# Patient Record
Sex: FEMALE | Race: WHITE | NOT HISPANIC OR LATINO | Employment: PART TIME | URBAN - METROPOLITAN AREA
[De-identification: names, ages, dates, MRNs, and addresses within clinical notes are randomized per-mention and may not be internally consistent; named-entity substitution may affect disease eponyms.]

---

## 2017-01-31 ENCOUNTER — ALLSCRIPTS OFFICE VISIT (OUTPATIENT)
Dept: OTHER | Facility: OTHER | Age: 42
End: 2017-01-31

## 2017-04-07 ENCOUNTER — ALLSCRIPTS OFFICE VISIT (OUTPATIENT)
Dept: OTHER | Facility: OTHER | Age: 42
End: 2017-04-07

## 2017-04-07 DIAGNOSIS — M25.562 PAIN IN LEFT KNEE: ICD-10-CM

## 2017-04-09 ENCOUNTER — OFFICE VISIT (OUTPATIENT)
Dept: URGENT CARE | Facility: MEDICAL CENTER | Age: 42
End: 2017-04-09
Payer: COMMERCIAL

## 2017-04-09 PROCEDURE — G0382 LEV 3 HOSP TYPE B ED VISIT: HCPCS

## 2017-04-09 PROCEDURE — S9083 URGENT CARE CENTER GLOBAL: HCPCS

## 2017-11-30 ENCOUNTER — ALLSCRIPTS OFFICE VISIT (OUTPATIENT)
Dept: OTHER | Facility: OTHER | Age: 42
End: 2017-11-30

## 2017-11-30 DIAGNOSIS — R20.0 ANESTHESIA OF SKIN: ICD-10-CM

## 2017-11-30 DIAGNOSIS — M25.562 PAIN IN LEFT KNEE: ICD-10-CM

## 2017-11-30 DIAGNOSIS — M54.12 RADICULOPATHY OF CERVICAL REGION: ICD-10-CM

## 2017-11-30 DIAGNOSIS — M54.16 RADICULOPATHY OF LUMBAR REGION: ICD-10-CM

## 2017-11-30 DIAGNOSIS — E03.9 HYPOTHYROIDISM: ICD-10-CM

## 2017-11-30 DIAGNOSIS — M79.602 PAIN OF LEFT ARM: ICD-10-CM

## 2017-12-05 NOTE — PROGRESS NOTES
Assessment   1  Cervical radiculopathy (723 4) (M54 12)  2  Lumbar radiculopathy (724 4) (M54 16)  3  Left medial knee pain (719 46) (M25 562)  4  Left arm pain (729 5) (M79 602)  5  Hypothyroidism (244 9) (E03 9)    Plan  Cervical radiculopathy, Hypothyroidism, Left arm pain, Left medial knee pain, Lumbar  radiculopathy    · * XR SPINE CERVICAL 2 OR 3 VW INJURY; Status:Active; Requested for:30Nov2017;    · * XR SPINE LUMBAR 2 OR 3 VIEWS INJURY; Status:Active; Requested for:30Nov2017;    · Follow-up visit in 2 weeks Evaluation and Treatment  Follow-up  Status: Hold For -  Scheduling  Requested for: [id number]  Cervical radiculopathy, Left arm pain, Left medial knee pain, Lumbar radiculopathy    · Start: Cyclobenzaprine HCl - 10 MG Oral Tablet; TAKE 1 TABLET TWICE DAILY AS  NEEDED  Cervical radiculopathy, Lumbar radiculopathy    · Start: Meloxicam 7 5 MG Oral Tablet; 1 to 2 PO QD PRN with Food  Hypothyroidism    · (1) COMPREHENSIVE METABOLIC PANEL; Status:Active - Retrospective By Protocol  Authorization; Requested for:30Nov2017 02:47PM;    · (1) T4, FREE; Status:Active - Retrospective By Protocol Authorization; Requested  for:30Nov2017 02:47PM;    · (1) TSH; Status:Active - Retrospective By Protocol Authorization; Requested  for:30Nov2017 02:47PM;   Numbness    · (1) VITAMIN B12; Status:Active - Retrospective By Protocol Authorization; Requested  for:30Nov2017 02:47PM;     Discussion/Summary    Call if any problems  Check labs for thyroid and other labs for numbness/radiculopathy and trial of Meloxicam prn pain with food and for radiculopathy  Check xrays of cervical and lumbar areas and consult Orthopedics if not better in 2 weeks  Recheck in 2 weeks  She had similar complaints this past April 2017 at Urgent Care  Did not tolerate Prednisone well  The patient was counseled regarding  Chief Complaint  Pt complains of pain in the L upper arm for a couple weeks now   Will get a feeling like just got a shot and then will feel a numbness down the arm  Does also have pain in the L knee and then the the l foot will sometimes feel numb  History of Present Illness  HPI: Pt complains of pain in the L upper arm for a couple weeks now  Will get a feeling like just got a shot and then will feel a numbness down the arm  Does also have pain in the L knee and then the the l foot will sometimes feel numb  Has hypothyroidism and takes thyroid medication  Review of Systems    Constitutional: No fever, no chills, feels well, no tiredness, no recent weight gain or loss  ENT: no ear ache, no loss of hearing, no nosebleeds or nasal discharge, no sore throat or hoarseness  Cardiovascular: no complaints of slow or fast heart rate, no chest pain, no palpitations, no leg claudication or lower extremity edema  Respiratory: no complaints of shortness of breath, no wheezing, no dyspnea on exertion, no orthopnea or PND  Breasts: no complaints of breast pain, breast lump or nipple discharge  Gastrointestinal: no complaints of abdominal pain, no constipation, no nausea or diarrhea, no vomiting, no bloody stools  Genitourinary: no complaints of dysuria, no incontinence, no pelvic pain, no dysmenorrhea, no vaginal discharge or abnormal vaginal bleeding  Musculoskeletal: as noted in HPI  Integumentary: no complaints of skin rash or lesion, no itching or dry skin, no skin wounds  Neurological: as noted in HPI  Other Symptoms: hx of hypothyroidism  Active Problems   1  Cervical radiculopathy (723 4) (M54 12)  2  Cough (786 2) (R05)  3  Encounter for routine gynecological examination (V72 31) (Z01 419)  4  Encounter for screening mammogram for malignant neoplasm of breast (V76 12)   (Z12 31)  5  Hypothyroidism (244 9) (E03 9)  6  Left medial knee pain (719 46) (M25 562)  7  Lumbar radiculopathy (724 4) (M54 16)  8  Nasal congestion (478 19) (R09 81)  9   Need for diphtheria-tetanus-pertussis (Tdap) vaccine, adult/adolescent (V06 1) (Z23)  10  Need for hepatitis B vaccination (V05 3) (Z23)  11  Need for influenza vaccination (V04 81) (Z23)  12  Need for MMR vaccine (V06 4) (Z23)  13  Need for tuberculosis vaccination (V03 2) (Z23)  14  Obesity (278 00) (E66 9)  15  PPD screening test (V74 1) (Z11 1)  16  Sciatica (724 3) (M54 30)    Past Medical History   1  History of Acute sinusitis (461 9) (J01 90)  2  History of Impacted cerumen of left ear (380 4) (H61 22)  3  History of Onychomycosis of toenail (110 1) (B35 1)  4  History of Oral thrush (112 0) (B37 0)  5  History of Pap Smear (+) Low Grade Squamous Intraepithelial Lesion (795 03)  6  History of Pap smear abnormality of cervix with ASCUS favoring benign (795 01)   (R87 610)  7  History of Polyp Of External Auditory Canal (385 30)  8  History of Upper respiratory infection (465 9) (J06 9)    Family History  Mother   1  Family history of Type 2 Diabetes Mellitus  Father   2  Family history of Benign Essential Hypertension    Social History    · Being A Social Drinker   · Current Every Day Smoker (305 1)   · Sexually active    Surgical History   1  History of Thyroid Surgery Total Thyroidectomy  2  History of Tonsillectomy With Adenoidectomy    Current Meds  1  Levothyroxine Sodium 175 MCG Oral Tablet; Therapy: 42BNE0312 to (Evaluate:29Mar2015) Recorded    Allergies   1  LamISIL PACK    Vitals   Recorded: 16JBO1590 16:61LV   Systolic 091   Diastolic 70   Height 5 ft 10 in   Weight 230 lb 8 oz   BMI Calculated 33 07   BSA Calculated 2 22     Physical Exam    Constitutional   General appearance: No acute distress, well appearing and well nourished  Eyes   Conjunctiva and lids: No swelling, erythema or discharge  Pupils and irises: Equal, round and reactive to light  Ears, Nose, Mouth, and Throat   External inspection of ears and nose: Normal     Otoscopic examination: Tympanic membranes translucent with normal light reflex  Canals patent without erythema      Nasal mucosa, septum, and turbinates: Normal without edema or erythema  Oropharynx: Normal with no erythema, edema, exudate or lesions  Pulmonary   Respiratory effort: No increased work of breathing or signs of respiratory distress  Auscultation of lungs: Clear to auscultation  Cardiovascular   Palpation of heart: Normal PMI, no thrills  Auscultation of heart: Normal rate and rhythm, normal S1 and S2, without murmurs  Examination of extremities for edema and/or varicosities: Normal     Carotid pulses: Normal     Lymphatic   Palpation of lymph nodes in neck: No lymphadenopathy  Musculoskeletal   Gait and station: Normal     Digits and nails: Normal without clubbing or cyanosis  Inspection/palpation of joints, bones, and muscles: Abnormal   neck and low back pain with radiculopathy  Skin   Skin and subcutaneous tissue: Normal without rashes or lesions  Neurologic   Cranial nerves: Cranial nerves 2-12 intact  Reflexes: 2+ and symmetric  Sensation: No sensory loss      Psychiatric   Orientation to person, place, and time: Normal     Mood and affect: Normal          Signatures   Electronically signed by : Amira Vallejo DO; Nov 30 2017  2:52PM EST                       (Author)

## 2017-12-08 ENCOUNTER — HOSPITAL ENCOUNTER (OUTPATIENT)
Dept: RADIOLOGY | Facility: HOSPITAL | Age: 42
Discharge: HOME | End: 2017-12-08
Payer: COMMERCIAL

## 2017-12-08 DIAGNOSIS — M79.602 PAIN OF LEFT ARM: ICD-10-CM

## 2017-12-08 DIAGNOSIS — M54.16 RADICULOPATHY OF LUMBAR REGION: ICD-10-CM

## 2017-12-08 DIAGNOSIS — E03.9 HYPOTHYROIDISM: ICD-10-CM

## 2017-12-08 DIAGNOSIS — M25.562 PAIN IN LEFT KNEE: ICD-10-CM

## 2017-12-08 DIAGNOSIS — M54.12 RADICULOPATHY OF CERVICAL REGION: ICD-10-CM

## 2017-12-08 PROCEDURE — 72100 X-RAY EXAM L-S SPINE 2/3 VWS: CPT

## 2017-12-08 PROCEDURE — 72040 X-RAY EXAM NECK SPINE 2-3 VW: CPT

## 2017-12-12 ENCOUNTER — GENERIC CONVERSION - ENCOUNTER (OUTPATIENT)
Dept: OTHER | Facility: OTHER | Age: 42
End: 2017-12-12

## 2017-12-13 ENCOUNTER — GENERIC CONVERSION - ENCOUNTER (OUTPATIENT)
Dept: OTHER | Facility: OTHER | Age: 42
End: 2017-12-13

## 2017-12-14 ENCOUNTER — ALLSCRIPTS OFFICE VISIT (OUTPATIENT)
Dept: OTHER | Facility: OTHER | Age: 42
End: 2017-12-14

## 2018-01-14 VITALS
BODY MASS INDEX: 33 KG/M2 | HEIGHT: 70 IN | SYSTOLIC BLOOD PRESSURE: 136 MMHG | DIASTOLIC BLOOD PRESSURE: 70 MMHG | WEIGHT: 230.5 LBS

## 2018-01-14 VITALS
WEIGHT: 229.38 LBS | SYSTOLIC BLOOD PRESSURE: 113 MMHG | DIASTOLIC BLOOD PRESSURE: 62 MMHG | BODY MASS INDEX: 32.84 KG/M2 | HEIGHT: 70 IN

## 2018-01-14 NOTE — RESULT NOTES
Message   Immune to mumps and varicella zoster  Verified Results  (1) VARICELLA ZOSTER IMMUNITY(IGG) [id number] 11:32AM Verle [place]   TW Order Number: [id number]     Test Name Result Flag Reference   TARSHA ZOSTER IGG IMMUNE  IMMUNE     (1) MUMPS  ANTIBODIES, IGG [id number] 11:32AM Quiana Blade Order Number: [id number]     Test Name Result Flag Reference   MUMPS IgG IMMUNE  IMMUNE   IMMUNE - Presumed immune to mumps infection

## 2018-01-15 NOTE — MISCELLANEOUS
Provider Comments  Provider Comments:   You had an appointment with Dr Humaira Lezama on 01/31/2017, however you did no show this appointment  Please call our office to reschedule this appointment at [phone]   Thank you      Signatures   Electronically signed by : August Irizarry, ; Jan 31 2017  3:55PM EST                       (Author)

## 2018-01-16 NOTE — PROGRESS NOTES
Assessment    1  Hypothyroidism (244 9) (E03 9)   2  Obesity (278 00) (E66 9)    Plan  Health Maintenance, Encounter for routine gynecological examination, Encounter for  screening mammogram for malignant neoplasm of breast, Hypothyroidism, Need for  diphtheria-tetanus-pertussis (Tdap) vaccine, adult/adolescent, Need for hepatitis B  vaccination, Need for influenza vaccination, Need for tuberculosis vaccination,  Obesity    · Follow-up visit in 1 year Evaluation and Treatment  Follow-up  Status: Hold For -  Scheduling  Requested for: [id number]    Discussion/Summary    Here for thyroid f-up and weight check  Awaiting measles titer  Get GYN exams and mammograms as directed  The patient was counseled regarding  Possible side effects of new medications were reviewed with the patient/guardian today  The treatment plan was reviewed with the patient/guardian  The patient/guardian understands and agrees with the treatment plan      Chief Complaint  PT is here today to discuss TSH results  PT has no other concerns  History of Present Illness  HPI: PT is here today to discuss TSH results  PT has no other concerns  Sees Endocrinology next week  TSH is elevated, no symptoms or complaints  Had thyroidectomy and is taking thyroid med as directed  Review of Systems    Constitutional: No fever, no chills, feels well, no tiredness, no recent weight gain or loss  ENT: no ear ache, no loss of hearing, no nosebleeds or nasal discharge, no sore throat or hoarseness  Cardiovascular: no complaints of slow or fast heart rate, no chest pain, no palpitations, no leg claudication or lower extremity edema  Respiratory: no complaints of shortness of breath, no wheezing, no dyspnea on exertion, no orthopnea or PND  Breasts: no complaints of breast pain, breast lump or nipple discharge  Gastrointestinal: no complaints of abdominal pain, no constipation, no nausea or diarrhea, no vomiting, no bloody stools  Genitourinary: no complaints of dysuria, no incontinence, no pelvic pain, no dysmenorrhea, no vaginal discharge or abnormal vaginal bleeding  Musculoskeletal: no complaints of arthralgia, no myalgia, no joint swelling or stiffness, no limb pain or swelling  Integumentary: no complaints of skin rash or lesion, no itching or dry skin, no skin wounds  Neurological: no complaints of headache, no confusion, no numbness or tingling, no dizziness or fainting  Active Problems    1  Encounter for routine gynecological examination (V72 31) (Z01 419)   2  Encounter for screening mammogram for malignant neoplasm of breast (V76 12)   (Z12 31)   3  Hypothyroidism (244 9) (E03 9)   4  Need for diphtheria-tetanus-pertussis (Tdap) vaccine, adult/adolescent (V06 1) (Z23)   5  Need for hepatitis B vaccination (V05 3) (Z23)   6  Need for influenza vaccination (V04 81) (Z23)   7  Need for tuberculosis vaccination (V03 2) (Z23)   8  PPD screening test (V74 1) (Z11 1)   9  Sciatica (724 3) (M54 30)    Past Medical History    1  History of Acute sinusitis (461 9) (J01 90)   2  History of Impacted cerumen of left ear (380 4) (H61 22)   3  History of Onychomycosis of toenail (110 1) (B35 1)   4  History of Oral thrush (112 0) (B37 0)   5  History of Pap Smear (+) Low Grade Squamous Intraepithelial Lesion (795 03)   6  History of Pap smear abnormality of cervix with ASCUS favoring benign (795 01)   (R87 610)   7  History of Polyp Of External Auditory Canal (385 30)   8  History of Upper respiratory infection (465 9) (J06 9)    Family History    1  Family history of Type 2 Diabetes Mellitus    2  Family history of Benign Essential Hypertension    Social History    · Being A Social Drinker   · Current Every Day Smoker (305 1)   · Sexually active    Surgical History    1  History of Thyroid Surgery Total Thyroidectomy   2  History of Tonsillectomy With Adenoidectomy    Current Meds   1   Levothyroxine Sodium 175 MCG Oral Tablet; Therapy: 48EGG4593 to (Evaluate:29Mar2015) Recorded    Allergies    1  LamISIL PACK    Vitals   Recorded: 41RQQ9237 09:11AM Recorded: 11RFH2754 96:38VF   Systolic  515   Diastolic  72   Height 5 ft 10 in    Weight  244 lb 4 00 oz   BMI Calculated 35 05    BSA Calculated 2 27      Physical Exam    Constitutional   General appearance: No acute distress, well appearing and well nourished  Eyes   Conjunctiva and lids: No swelling, erythema or discharge  Pupils and irises: Equal, round and reactive to light  Ears, Nose, Mouth, and Throat   External inspection of ears and nose: Normal     Otoscopic examination: Tympanic membranes translucent with normal light reflex  Canals patent without erythema  Nasal mucosa, septum, and turbinates: Normal without edema or erythema  Oropharynx: Normal with no erythema, edema, exudate or lesions  Pulmonary   Respiratory effort: No increased work of breathing or signs of respiratory distress  Auscultation of lungs: Clear to auscultation  Cardiovascular   Palpation of heart: Normal PMI, no thrills  Auscultation of heart: Normal rate and rhythm, normal S1 and S2, without murmurs  Examination of extremities for edema and/or varicosities: Normal     Carotid pulses: Normal     Lymphatic   Palpation of lymph nodes in neck: No lymphadenopathy  Musculoskeletal   Gait and station: Normal     Digits and nails: Normal without clubbing or cyanosis  Inspection/palpation of joints, bones, and muscles: Normal     Skin   Skin and subcutaneous tissue: Normal without rashes or lesions  Neurologic   Cranial nerves: Cranial nerves 2-12 intact  Reflexes: 2+ and symmetric  Sensation: No sensory loss      Psychiatric   Orientation to person, place, and time: Normal     Mood and affect: Normal          Signatures   Electronically signed by : Brenda Mcmillan DO; Jan 29 2016  9:22AM EST                       (Author)

## 2018-01-16 NOTE — PROGRESS NOTES
Chief Complaint  PT IS HERE FOR A MMR BOOSTER  Active Problems    1  Encounter for routine gynecological examination (V72 31) (Z01 419)   2  Encounter for screening mammogram for malignant neoplasm of breast (V76 12)   (Z12 31)   3  Hypothyroidism (244 9) (E03 9)   4  Need for diphtheria-tetanus-pertussis (Tdap) vaccine, adult/adolescent (V06 1) (Z23)   5  Need for hepatitis B vaccination (V05 3) (Z23)   6  Need for influenza vaccination (V04 81) (Z23)   7  Need for MMR vaccine (V06 4) (Z23)   8  Need for tuberculosis vaccination (V03 2) (Z23)   9  Obesity (278 00) (E66 9)   10  PPD screening test (V74 1) (Z11 1)   11  Sciatica (724 3) (M54 30)    Current Meds   1  Levothyroxine Sodium 175 MCG Oral Tablet; Therapy: 49EVA4533 to (Evaluate:29Mar2015) Recorded    Allergies    1   LamISIL PACK    Plan  Need for MMR vaccine    · MMR    Future Appointments    Date/Time Provider Specialty Site   01/31/2017 03:30 PM Tiffanie Holland DO [facility]     Signatures   Electronically signed by : Brayan Millard DO; Feb 11 2016  1:44PM EST                       (Author)

## 2018-01-18 NOTE — PROGRESS NOTES
Chief Complaint  PT IS HERE FOR A PPD CHECK  Active Problems    1  Encounter for routine gynecological examination (V72 31) (Z01 419)   2  Encounter for screening mammogram for malignant neoplasm of breast (V76 12)   (Z12 31)   3  Hypothyroidism (244 9) (E03 9)   4  Need for diphtheria-tetanus-pertussis (Tdap) vaccine, adult/adolescent (V06 1) (Z23)   5  Need for hepatitis B vaccination (V05 3) (Z23)   6  Need for influenza vaccination (V04 81) (Z23)   7  Need for tuberculosis vaccination (V03 2) (Z23)   8  PPD screening test (V74 1) (Z11 1)   9  Sciatica (724 3) (M54 30)    Current Meds   1  Levothyroxine Sodium 175 MCG Oral Tablet; Therapy: 68PDH9822 to (Evaluate:92Mmu0114) Recorded    Allergies    1   LamISIL PACK    Plan  PPD screening test    · PPD    Signatures   Electronically signed by : Lucia Car DO; Jan 25 2016 12:21PM EST                       (Author)

## 2018-01-23 NOTE — MISCELLANEOUS
Message  Return to work or school:   Adriel Vera is under my professional care  She was seen in my office on 11/30/2017   She is able to return to work on  12/01/2017      Radha Nunes was seen in our office on 11/30/2017 by her Primary Care Physician     Ruby Brower DO/am       Signatures   Electronically signed by : Nicole Parker, ; Dec 12 2017 11:21AM EST                       (Author)

## 2018-01-23 NOTE — RESULT NOTES
Verified Results  * XR SPINE CERVICAL 2 OR 3 VW INJURY [id number] 12:21PM [employer] Order Number: [id number]     Test Name Result Flag Reference   XR SPINE CERVICAL 2 OR 3 VW (Report)     CERVICAL SPINE     INDICATION: Neck pain  COMPARISON: None     VIEWS: AP, lateral and open mouth projections     IMAGES: 4     FINDINGS:     No evidence of fracture or subluxation  C5-6 mild loss of disc height and endplate change noted  The prevertebral soft tissues are within normal limits  The lung apices are intact  IMPRESSION:   Mild degenerative changes noted without acute osseous abnormality  Workstation performed: [id number]     Signed by:   Cole Menezes MD   12/13/17     * XR SPINE LUMBAR 2 OR 3 VIEWS INJURY 50Rur1069 12:21PM [employer] Order Number: [id number]     Test Name Result Flag Reference   XR SPINE LUMBAR 2 OR 3 VIEWS (Report)     LUMBAR SPINE     INDICATION: Back pain  COMPARISON: 9/13/2011     VIEWS: AP, lateral and coned-down projections     IMAGES: 3     FINDINGS:     Mild dextroscoliosis with apex at L4  There is no radiographic evidence of acute fracture or destructive osseous lesion  Mild lower lumbar degenerative changes noted  Visualized soft tissues appear unremarkable  IMPRESSION:   Mild lower lumbar degenerative changes noted and mild dextroscoliosis  No acute findings         Workstation performed: [id number]     Signed by:   Cole Menezes MD   12/13/17

## 2018-01-24 VITALS
BODY MASS INDEX: 32.66 KG/M2 | SYSTOLIC BLOOD PRESSURE: 136 MMHG | DIASTOLIC BLOOD PRESSURE: 76 MMHG | WEIGHT: 228.13 LBS | HEIGHT: 70 IN

## 2018-01-30 RX ORDER — LEVOTHYROXINE SODIUM 175 UG/1
TABLET ORAL
COMMUNITY
Start: 2014-10-27 | End: 2018-01-31 | Stop reason: ALTCHOICE

## 2018-01-30 RX ORDER — LEVOTHYROXINE SODIUM 0.2 MG/1
TABLET ORAL
Refills: 0 | COMMUNITY
Start: 2017-11-03 | End: 2018-05-30 | Stop reason: SDUPTHER

## 2018-01-30 RX ORDER — CYCLOBENZAPRINE HCL 10 MG
1 TABLET ORAL 2 TIMES DAILY PRN
COMMUNITY
Start: 2017-11-30 | End: 2018-01-31 | Stop reason: ALTCHOICE

## 2018-01-30 RX ORDER — MELOXICAM 7.5 MG/1
TABLET ORAL
COMMUNITY
Start: 2017-11-30 | End: 2018-01-31 | Stop reason: ALTCHOICE

## 2018-01-31 ENCOUNTER — OFFICE VISIT (OUTPATIENT)
Dept: FAMILY MEDICINE CLINIC | Facility: CLINIC | Age: 43
End: 2018-01-31
Payer: COMMERCIAL

## 2018-01-31 ENCOUNTER — HOSPITAL ENCOUNTER (OUTPATIENT)
Dept: RADIOLOGY | Facility: HOSPITAL | Age: 43
Discharge: HOME | End: 2018-01-31
Payer: COMMERCIAL

## 2018-01-31 ENCOUNTER — TRANSCRIBE ORDERS (OUTPATIENT)
Dept: LAB | Facility: CLINIC | Age: 43
End: 2018-01-31

## 2018-01-31 ENCOUNTER — APPOINTMENT (OUTPATIENT)
Dept: LAB | Facility: CLINIC | Age: 43
End: 2018-01-31
Payer: COMMERCIAL

## 2018-01-31 VITALS
SYSTOLIC BLOOD PRESSURE: 126 MMHG | HEIGHT: 70 IN | BODY MASS INDEX: 32.1 KG/M2 | DIASTOLIC BLOOD PRESSURE: 78 MMHG | WEIGHT: 224.2 LBS

## 2018-01-31 DIAGNOSIS — R07.9 CHEST PAIN, UNSPECIFIED TYPE: ICD-10-CM

## 2018-01-31 DIAGNOSIS — N64.4 BREAST PAIN, LEFT: ICD-10-CM

## 2018-01-31 DIAGNOSIS — E03.9 HYPOTHYROIDISM: ICD-10-CM

## 2018-01-31 DIAGNOSIS — Z72.0 TOBACCO USE: ICD-10-CM

## 2018-01-31 DIAGNOSIS — R20.0 ANESTHESIA OF SKIN: ICD-10-CM

## 2018-01-31 LAB
ALBUMIN SERPL BCP-MCNC: 3.5 G/DL (ref 3.5–5)
ALBUMIN SERPL BCP-MCNC: 3.9 G/DL (ref 3.5–5)
ALP SERPL-CCNC: 90 U/L (ref 46–116)
ALP SERPL-CCNC: 95 U/L (ref 46–116)
ALT SERPL W P-5'-P-CCNC: 20 U/L (ref 12–78)
ALT SERPL W P-5'-P-CCNC: 23 U/L (ref 12–78)
ANION GAP SERPL CALCULATED.3IONS-SCNC: 6 MMOL/L (ref 4–13)
ANION GAP SERPL CALCULATED.3IONS-SCNC: 7 MMOL/L (ref 4–13)
AST SERPL W P-5'-P-CCNC: 12 U/L (ref 5–45)
AST SERPL W P-5'-P-CCNC: 14 U/L (ref 5–45)
BASOPHILS # BLD AUTO: 0.06 THOUSANDS/ΜL (ref 0–0.1)
BASOPHILS NFR BLD AUTO: 1 % (ref 0–1)
BILIRUB SERPL-MCNC: 0.5 MG/DL (ref 0.2–1)
BILIRUB SERPL-MCNC: 0.57 MG/DL (ref 0.2–1)
BUN SERPL-MCNC: 9 MG/DL (ref 5–25)
BUN SERPL-MCNC: 9 MG/DL (ref 5–25)
CALCIUM SERPL-MCNC: 8.7 MG/DL (ref 8.3–10.1)
CALCIUM SERPL-MCNC: 8.8 MG/DL (ref 8.3–10.1)
CHLORIDE SERPL-SCNC: 103 MMOL/L (ref 100–108)
CHLORIDE SERPL-SCNC: 104 MMOL/L (ref 100–108)
CK SERPL-CCNC: 87 U/L (ref 26–192)
CO2 SERPL-SCNC: 27 MMOL/L (ref 21–32)
CO2 SERPL-SCNC: 27 MMOL/L (ref 21–32)
CREAT SERPL-MCNC: 0.81 MG/DL (ref 0.6–1.3)
CREAT SERPL-MCNC: 0.83 MG/DL (ref 0.6–1.3)
EOSINOPHIL # BLD AUTO: 0.16 THOUSAND/ΜL (ref 0–0.61)
EOSINOPHIL NFR BLD AUTO: 1 % (ref 0–6)
ERYTHROCYTE [DISTWIDTH] IN BLOOD BY AUTOMATED COUNT: 16.6 % (ref 11.6–15.1)
GFR SERPL CREATININE-BSD FRML MDRD: 87 ML/MIN/1.73SQ M
GFR SERPL CREATININE-BSD FRML MDRD: 89 ML/MIN/1.73SQ M
GLUCOSE SERPL-MCNC: 113 MG/DL (ref 65–140)
GLUCOSE SERPL-MCNC: 80 MG/DL (ref 65–140)
HCT VFR BLD AUTO: 37 % (ref 34.8–46.1)
HGB BLD-MCNC: 11.5 G/DL (ref 11.5–15.4)
LYMPHOCYTES # BLD AUTO: 2.98 THOUSANDS/ΜL (ref 0.6–4.47)
LYMPHOCYTES NFR BLD AUTO: 26 % (ref 14–44)
MCH RBC QN AUTO: 24.4 PG (ref 26.8–34.3)
MCHC RBC AUTO-ENTMCNC: 31.1 G/DL (ref 31.4–37.4)
MCV RBC AUTO: 78 FL (ref 82–98)
MONOCYTES # BLD AUTO: 0.82 THOUSAND/ΜL (ref 0.17–1.22)
MONOCYTES NFR BLD AUTO: 7 % (ref 4–12)
NEUTROPHILS # BLD AUTO: 7.45 THOUSANDS/ΜL (ref 1.85–7.62)
NEUTS SEG NFR BLD AUTO: 65 % (ref 43–75)
NRBC BLD AUTO-RTO: 0 /100 WBCS
PLATELET # BLD AUTO: 362 THOUSANDS/UL (ref 149–390)
PMV BLD AUTO: 10.6 FL (ref 8.9–12.7)
POTASSIUM SERPL-SCNC: 3.8 MMOL/L (ref 3.5–5.3)
POTASSIUM SERPL-SCNC: 3.9 MMOL/L (ref 3.5–5.3)
PROT SERPL-MCNC: 7.1 G/DL (ref 6.4–8.2)
PROT SERPL-MCNC: 8 G/DL (ref 6.4–8.2)
RBC # BLD AUTO: 4.72 MILLION/UL (ref 3.81–5.12)
SODIUM SERPL-SCNC: 136 MMOL/L (ref 136–145)
SODIUM SERPL-SCNC: 138 MMOL/L (ref 136–145)
T4 FREE SERPL-MCNC: 1.17 NG/DL (ref 0.76–1.46)
TROPONIN I SERPL-MCNC: <0.02 NG/ML
TSH SERPL DL<=0.05 MIU/L-ACNC: 4.85 UIU/ML (ref 0.36–3.74)
VIT B12 SERPL-MCNC: 249 PG/ML (ref 100–900)
WBC # BLD AUTO: 11.49 THOUSAND/UL (ref 4.31–10.16)

## 2018-01-31 PROCEDURE — 80053 COMPREHEN METABOLIC PANEL: CPT | Performed by: FAMILY MEDICINE

## 2018-01-31 PROCEDURE — 82550 ASSAY OF CK (CPK): CPT | Performed by: FAMILY MEDICINE

## 2018-01-31 PROCEDURE — 93000 ELECTROCARDIOGRAM COMPLETE: CPT | Performed by: FAMILY MEDICINE

## 2018-01-31 PROCEDURE — 84443 ASSAY THYROID STIM HORMONE: CPT

## 2018-01-31 PROCEDURE — 84484 ASSAY OF TROPONIN QUANT: CPT | Performed by: FAMILY MEDICINE

## 2018-01-31 PROCEDURE — 99214 OFFICE O/P EST MOD 30 MIN: CPT | Performed by: FAMILY MEDICINE

## 2018-01-31 PROCEDURE — 84439 ASSAY OF FREE THYROXINE: CPT

## 2018-01-31 PROCEDURE — 82607 VITAMIN B-12: CPT

## 2018-01-31 PROCEDURE — 36415 COLL VENOUS BLD VENIPUNCTURE: CPT

## 2018-01-31 PROCEDURE — 80053 COMPREHEN METABOLIC PANEL: CPT

## 2018-01-31 PROCEDURE — 85025 COMPLETE CBC W/AUTO DIFF WBC: CPT | Performed by: FAMILY MEDICINE

## 2018-01-31 PROCEDURE — 71046 X-RAY EXAM CHEST 2 VIEWS: CPT

## 2018-01-31 NOTE — PROGRESS NOTES
Assessment/Plan:      Diagnoses and all orders for this visit:    Chest pain, unspecified type  Comments:  Check cxray for left sided chest pain and hx of tobacco use  Check left breast diagnostic mammogram and consult [facility] for f-up  Check la  Orders:  -     XR chest pa & lateral; Future  -     CK  -     Troponin I  -     Comprehensive metabolic panel  -     CBC and differential; Future  -     CBC and differential  -     POCT ECG  -     Ambulatory referral to Cardiology; Future    Breast pain, left  -     XR chest pa & lateral; Future  -     Comprehensive metabolic panel  -     CBC and differential; Future  -     CBC and differential  -     POCT ECG  -     Mammo diagnostic left w cad; Future  -     Ambulatory Referral to Breast Surgery; Future    Tobacco use  -     XR chest pa & lateral; Future  -     Comprehensive metabolic panel  -     CBC and differential; Future  -     CBC and differential  -     POCT ECG  -     Ambulatory referral to Cardiology; Future    Other orders  -     Discontinue: cyclobenzaprine (FLEXERIL) 10 mg tablet; Take 1 tablet by mouth 2 (two) times a day as needed  -     levothyroxine 200 mcg tablet; TAKE 1 TABLET (200 MCG TOTAL) BY MOUTH DAILY  -     Discontinue: levothyroxine 175 mcg tablet; Take by mouth  -     Discontinue: meloxicam (MOBIC) 7 5 mg tablet; Take by mouth          Subjective:     Patient ID: Tiffani Mortensen is a 36 y o  female  Here for left breast pain for 2 weeks, pt  Did not feel any lumps in breast  Pt  Is unsure if pain is from left breast or chest pain  No sob or ha  At night feels heartbeat  Pt  Does admit to breast biopsy of left breast as per pt  In her 28's  Pt  Is a pt  Of Dr Macario Briceno  No left arm pain  Hx of tobacco use  No diaphoresis  Review of Systems   Constitutional: Negative  HENT: Negative  Eyes: Negative  Respiratory: Negative  Cardiovascular: Positive for chest pain  Gastrointestinal: Negative  Endocrine: Negative  Genitourinary: Negative  Left breast pain times 2 weeks   Musculoskeletal: Negative  Skin: Negative  Allergic/Immunologic: Negative  Neurological: Negative  Hematological: Negative  Psychiatric/Behavioral: Negative  Objective:     Physical Exam   Constitutional: She is oriented to person, place, and time  She appears well-developed and well-nourished  HENT:   Head: Normocephalic and atraumatic  Nose: Nose normal    Mouth/Throat: Oropharynx is clear and moist    Eyes: Conjunctivae and EOM are normal  Pupils are equal, round, and reactive to light  Neck: Normal range of motion  Neck supple  Cardiovascular: Normal rate, regular rhythm, normal heart sounds and intact distal pulses  Pulmonary/Chest: Effort normal and breath sounds normal  She exhibits tenderness  Abdominal: Soft  Bowel sounds are normal    Genitourinary:   Genitourinary Comments: Left breast pain, no left breast lump palpated on exam today  B/l breast exam today wnl, no nipple discharge and no palpable masses in b/l breasts today  [facility] in room during exam    Musculoskeletal: Normal range of motion  Neurological: She is alert and oriented to person, place, and time  She has normal reflexes  Skin: Skin is warm and dry  Psychiatric: She has a normal mood and affect

## 2018-01-31 NOTE — PATIENT INSTRUCTIONS
Check cxray for left sided chest pain and hx of tobacco use  Check left breast diagnostic mammogram and consult [facility] for f-up  Check labs as directed for left sided chest and left breast pain  Quit tobacco  EKG done today  Due to left sided chest pain and hx of tobacco use rec  Consult with [facility] for left sided chest pain  Rec  ER if left sided chest pain worse  EKG was wnl, no acute ST-T changes

## 2018-02-01 DIAGNOSIS — D72.829 LEUKOCYTOSIS, UNSPECIFIED TYPE: Primary | ICD-10-CM

## 2018-02-22 ENCOUNTER — TELEPHONE (OUTPATIENT)
Dept: FAMILY MEDICINE CLINIC | Facility: CLINIC | Age: 43
End: 2018-02-22

## 2018-02-22 DIAGNOSIS — J02.9 SORE THROAT: Primary | ICD-10-CM

## 2018-02-22 RX ORDER — AZITHROMYCIN 250 MG/1
TABLET, FILM COATED ORAL
Qty: 6 TABLET | Refills: 0 | Status: SHIPPED | OUTPATIENT
Start: 2018-02-22 | End: 2018-02-27

## 2018-02-22 NOTE — TELEPHONE ENCOUNTER
Pt called in stating that since Tuesday her throat and her right ear have been hurting her  We offered her an appt for today and tomorrow and she refused stating that she has 2 jibs she needs to get to  Emilee Oleary would like to know if you can please prescribe her a med?  To [facility]   Cb# [phone]

## 2018-04-27 ENCOUNTER — TELEPHONE (OUTPATIENT)
Dept: FAMILY MEDICINE CLINIC | Facility: CLINIC | Age: 43
End: 2018-04-27

## 2018-04-27 DIAGNOSIS — J06.9 UPPER RESPIRATORY TRACT INFECTION, UNSPECIFIED TYPE: Primary | ICD-10-CM

## 2018-04-27 RX ORDER — AZITHROMYCIN 250 MG/1
TABLET, FILM COATED ORAL
Qty: 6 TABLET | Refills: 0 | Status: SHIPPED | OUTPATIENT
Start: 2018-04-27 | End: 2018-05-02

## 2018-04-27 NOTE — TELEPHONE ENCOUNTER
Patient has a sore throat, stuffy nose, yellow discharge  She can't come in today  She has too many meetings    Can something be called into [facility] [address]

## 2018-05-02 ENCOUNTER — OFFICE VISIT (OUTPATIENT)
Dept: FAMILY MEDICINE CLINIC | Facility: CLINIC | Age: 43
End: 2018-05-02
Payer: COMMERCIAL

## 2018-05-02 VITALS
TEMPERATURE: 97.8 F | SYSTOLIC BLOOD PRESSURE: 118 MMHG | DIASTOLIC BLOOD PRESSURE: 64 MMHG | HEIGHT: 70 IN | BODY MASS INDEX: 31.38 KG/M2 | WEIGHT: 219.2 LBS

## 2018-05-02 DIAGNOSIS — H69.81 DYSFUNCTION OF RIGHT EUSTACHIAN TUBE: Primary | ICD-10-CM

## 2018-05-02 PROBLEM — M54.16 LUMBAR RADICULOPATHY: Status: ACTIVE | Noted: 2017-04-09

## 2018-05-02 PROBLEM — M54.12 CERVICAL RADICULOPATHY: Status: ACTIVE | Noted: 2017-04-09

## 2018-05-02 PROCEDURE — 99213 OFFICE O/P EST LOW 20 MIN: CPT | Performed by: NURSE PRACTITIONER

## 2018-05-02 RX ORDER — FLUTICASONE PROPIONATE 50 MCG
1 SPRAY, SUSPENSION (ML) NASAL DAILY
Qty: 16 G | Refills: 0 | Status: SHIPPED | OUTPATIENT
Start: 2018-05-02 | End: 2019-01-10 | Stop reason: ALTCHOICE

## 2018-05-02 NOTE — PATIENT INSTRUCTIONS
Start flonase, this is one spray each nostril daily  You can also use an over the counter oral decongestant  You may use tylenol or advil as needed for any ear discomfort  Eustachian Tube Dysfunction   WHAT YOU NEED TO KNOW:   What is eustachian tube dysfunction? Eustachian tube dysfunction (ETD) is a condition that prevents your eustachian tubes from opening properly  It can also cause them to become blocked  Eustachian tubes connect your middle ear to the back of your nose and throat  These tubes open and allow air to flow in and out when you sneeze, swallow, or yawn  What causes or increases my risk of ETD? ETD may be caused by swelling or buildup of mucus in your eustachian tubes  Allergies, a cold, or sinus infection can increase your risk for ETD  Smoking also increases your risk for ETD  What are the signs and symptoms of ETD? · Fullness or pressure in your ears    · Muffled hearing    · Pain in one or both ears    · Ringing in your ears    · Popping or clicking feeling in your ears    · Trouble keeping your balance  How is ETD diagnosed? Your healthcare provider will ask about your symptoms  He will examine your ears, your nose, and the back of your throat  He may also do a hearing test    How is ETD treated? Your ETD may get better on its own without any treatment  You may need any of the following:  · Exercises  such as swallowing, yawning, or chewing gum may help to open your eustachian tubes  Your healthcare provider may also recommend that you take a deep breath and then blow with your mouth shut and your nostrils pinched closed  · Air pressure devices  push air into your nose and eustachian tubes to help relieve air pressure in your ear  · Treatment for allergies  such as decongestants, antihistamines, and nasal steroids may improve ETD  They may help decrease swelling of the eustachian tubes  · Ear tubes  may help to keep your middle ear open   During this procedure, your healthcare provider will cut a small hole in your eardrum  · A myringotomy  is procedure to make a small cut in your eardrum and suction out fluid from your middle ear  · Tuboplasty  is a procedure to widen your eustachian tubes  When should I contact my healthcare provider? · Your symptoms do not improve or get worse  · You have a fever  · You have any hearing loss  · You have questions or concerns about your condition or care  CARE AGREEMENT:   You have the right to help plan your care  Learn about your health condition and how it may be treated  Discuss treatment options with your caregivers to decide what care you want to receive  You always have the right to refuse treatment  The above information is an  only  It is not intended as medical advice for individual conditions or treatments  Talk to your doctor, nurse or pharmacist before following any medical regimen to see if it is safe and effective for you  © 2017 [employer] Information is for End User's use only and may not be sold, redistributed or otherwise used for commercial purposes  All illustrations and images included in CareNotes® are the copyrighted property of A D A M , Inc  or Gaston Shearer

## 2018-05-02 NOTE — PROGRESS NOTES
Assessment/Plan:    Eustachian Tube Dysfunction:   Patient will start Flonase once daily to help with symptoms  She can continue with any Tylenol or Advil as needed for any ear discomfort  Patient was reassured that ear does not look infected and ear drums not perforated  No impacted cerumen present  She is to monitor symptoms and if no improvement or any worsening symptoms she is to let us know  Patient educated on relation of Flonase to prednisone which she had an adverse reaction to and to be aware if any reaction happens to let us know but low chance  Note for work given  Patient verbalizes understand and agrees with treatment plan  Diagnoses and all orders for this visit:    Dysfunction of right eustachian tube  -     fluticasone (FLONASE) 50 mcg/act nasal spray; 1 spray into each nostril daily          Subjective:      Patient ID: Dick Landon is a 36 y o  female  Chief Complaint   Patient presents with    Earache     right ear pain since yesterday, denies fever  Just completed Z Krishna yesterday  Patient presents to office today for ear pain  She was just recently seen for an upper respiratory infection and just finished her to from eyes than yesterday  She is now having right ear pain that started yesterday  She blew her nose at 1 point did get little bit dizzy  She feels like her ears are very congested  It is only mild pain  Ear pain started yesterday  Blew nose yesterday and got dizzy with ear pain  Has hx of impacted cerumen      Earache    There is pain in the right ear  This is a new problem  The current episode started yesterday  The problem has been unchanged  There has been no fever  The pain is at a severity of 2/10  The pain is mild  Associated symptoms include headaches (pressure right side, sinus) and rhinorrhea  Pertinent negatives include no abdominal pain, diarrhea, ear discharge, hearing loss, rash, sore throat or vomiting  She has tried nothing for the symptoms  The following portions of the patient's history were reviewed and updated as appropriate: allergies, current medications, past family history, past social history and problem list     Review of Systems   Constitutional: Negative for chills and fever  HENT: Positive for congestion (mild, improved), ear pain (and congestion), rhinorrhea and sinus pressure (right)  Negative for ear discharge, hearing loss, sinus pain and sore throat  Eyes: Negative for discharge and visual disturbance  Respiratory: Negative for chest tightness and shortness of breath  Cardiovascular: Negative for chest pain  Gastrointestinal: Negative for abdominal pain, diarrhea and vomiting  Genitourinary: Negative for difficulty urinating  Musculoskeletal: Negative for myalgias  Skin: Negative for rash  Neurological: Positive for headaches (pressure right side, sinus)  Psychiatric/Behavioral: Negative for agitation  Objective:  /64 (BP Location: Right arm, Patient Position: Sitting, Cuff Size: Standard)   Temp 97 8 °F (36 6 °C) (Tympanic)   Ht 5' 9 5" (1 765 m)   Wt 99 4 kg (219 lb 3 2 oz)   BMI 31 91 kg/m²      Physical Exam   Constitutional: She is oriented to person, place, and time  She appears well-developed  No distress  obese   HENT:   Head: Normocephalic and atraumatic  Right Ear: Hearing, external ear and ear canal normal  No drainage, swelling or tenderness  No foreign bodies  Tympanic membrane is not perforated, not erythematous, not retracted and not bulging  A middle ear effusion is present  No decreased hearing is noted  Left Ear: Hearing, external ear and ear canal normal  No drainage, swelling or tenderness  No foreign bodies  Tympanic membrane is not perforated, not erythematous, not retracted and not bulging  A middle ear effusion is present  No decreased hearing is noted  Nose: Rhinorrhea present  Eyes: Conjunctivae and lids are normal  Right eye exhibits no discharge   Left eye exhibits no discharge  Neck: Normal range of motion  Neck supple  No tracheal deviation present  Cardiovascular: Normal rate and regular rhythm  No murmur heard  Pulmonary/Chest: Effort normal and breath sounds normal  No respiratory distress  She has no wheezes  Abdominal: Soft  Bowel sounds are normal  She exhibits no distension  There is no tenderness  There is no guarding  Musculoskeletal: Normal range of motion  She exhibits no edema or deformity  Lymphadenopathy:     She has no cervical adenopathy  Neurological: She is alert and oriented to person, place, and time  Skin: Skin is warm and dry  No rash noted  She is not diaphoretic  No erythema  Psychiatric: She has a normal mood and affect  Her speech is normal and behavior is normal  Judgment and thought content normal  Cognition and memory are normal    Nursing note and vitals reviewed

## 2018-05-02 NOTE — LETTER
May 2, 2018     Patient: Kevin Moore   YOB: 1975   Date of Visit: 5/2/2018       To Whom it May Concern: Kevin Moore is under my professional care  She was seen in my office on 5/2/2018  She is medically excused from work 5/1/18  She may return to work on 5/2/18  If you have any questions or concerns, please don't hesitate to call           Sincerely,          DEE Betancourt        CC: No Recipients

## 2018-05-30 DIAGNOSIS — E03.9 HYPOTHYROIDISM, UNSPECIFIED TYPE: Primary | ICD-10-CM

## 2018-05-30 RX ORDER — LEVOTHYROXINE SODIUM 0.2 MG/1
TABLET ORAL
Qty: 30 TABLET | Refills: 1 | Status: SHIPPED | OUTPATIENT
Start: 2018-05-30 | End: 2018-07-02 | Stop reason: SDUPTHER

## 2018-07-02 ENCOUNTER — OFFICE VISIT (OUTPATIENT)
Dept: FAMILY MEDICINE CLINIC | Facility: CLINIC | Age: 43
End: 2018-07-02
Payer: COMMERCIAL

## 2018-07-02 ENCOUNTER — APPOINTMENT (OUTPATIENT)
Dept: LAB | Facility: HOSPITAL | Age: 43
End: 2018-07-02
Payer: COMMERCIAL

## 2018-07-02 VITALS
BODY MASS INDEX: 31.78 KG/M2 | HEIGHT: 70 IN | WEIGHT: 222 LBS | SYSTOLIC BLOOD PRESSURE: 128 MMHG | DIASTOLIC BLOOD PRESSURE: 76 MMHG

## 2018-07-02 DIAGNOSIS — R07.89 CHEST TIGHTNESS: Primary | ICD-10-CM

## 2018-07-02 DIAGNOSIS — R20.0 ARM NUMBNESS LEFT: ICD-10-CM

## 2018-07-02 DIAGNOSIS — E66.9 OBESITY (BMI 30-39.9): ICD-10-CM

## 2018-07-02 DIAGNOSIS — R07.89 CHEST TIGHTNESS: ICD-10-CM

## 2018-07-02 DIAGNOSIS — E03.9 HYPOTHYROIDISM, UNSPECIFIED TYPE: ICD-10-CM

## 2018-07-02 LAB
ALBUMIN SERPL BCP-MCNC: 3.6 G/DL (ref 3.5–5)
ALP SERPL-CCNC: 87 U/L (ref 46–116)
ALT SERPL W P-5'-P-CCNC: 17 U/L (ref 12–78)
ANION GAP SERPL CALCULATED.3IONS-SCNC: 7 MMOL/L (ref 4–13)
AST SERPL W P-5'-P-CCNC: 17 U/L (ref 5–45)
BASOPHILS # BLD AUTO: 0.04 THOUSANDS/ΜL (ref 0–0.1)
BASOPHILS NFR BLD AUTO: 0 % (ref 0–1)
BILIRUB SERPL-MCNC: 0.42 MG/DL (ref 0.2–1)
BUN SERPL-MCNC: 8 MG/DL (ref 5–25)
CALCIUM SERPL-MCNC: 8.8 MG/DL (ref 8.3–10.1)
CHLORIDE SERPL-SCNC: 103 MMOL/L (ref 100–108)
CK SERPL-CCNC: 86 U/L (ref 26–192)
CO2 SERPL-SCNC: 25 MMOL/L (ref 21–32)
CREAT SERPL-MCNC: 1 MG/DL (ref 0.6–1.3)
EOSINOPHIL # BLD AUTO: 0.11 THOUSAND/ΜL (ref 0–0.61)
EOSINOPHIL NFR BLD AUTO: 1 % (ref 0–6)
ERYTHROCYTE [DISTWIDTH] IN BLOOD BY AUTOMATED COUNT: 16.8 % (ref 11.6–15.1)
GFR SERPL CREATININE-BSD FRML MDRD: 69 ML/MIN/1.73SQ M
GLUCOSE SERPL-MCNC: 114 MG/DL (ref 65–140)
HCT VFR BLD AUTO: 39.2 % (ref 34.8–46.1)
HGB BLD-MCNC: 12.3 G/DL (ref 11.5–15.4)
LYMPHOCYTES # BLD AUTO: 2.87 THOUSANDS/ΜL (ref 0.6–4.47)
LYMPHOCYTES NFR BLD AUTO: 22 % (ref 14–44)
MCH RBC QN AUTO: 24.9 PG (ref 26.8–34.3)
MCHC RBC AUTO-ENTMCNC: 31.4 G/DL (ref 31.4–37.4)
MCV RBC AUTO: 79 FL (ref 82–98)
MONOCYTES # BLD AUTO: 0.55 THOUSAND/ΜL (ref 0.17–1.22)
MONOCYTES NFR BLD AUTO: 4 % (ref 4–12)
NEUTROPHILS # BLD AUTO: 9.22 THOUSANDS/ΜL (ref 1.85–7.62)
NEUTS SEG NFR BLD AUTO: 72 % (ref 43–75)
NRBC BLD AUTO-RTO: 0 /100 WBCS
PLATELET # BLD AUTO: 306 THOUSANDS/UL (ref 149–390)
PMV BLD AUTO: 10.7 FL (ref 8.9–12.7)
POTASSIUM SERPL-SCNC: 3.5 MMOL/L (ref 3.5–5.3)
PROT SERPL-MCNC: 7.4 G/DL (ref 6.4–8.2)
RBC # BLD AUTO: 4.94 MILLION/UL (ref 3.81–5.12)
SODIUM SERPL-SCNC: 135 MMOL/L (ref 136–145)
T4 FREE SERPL-MCNC: 0.97 NG/DL (ref 0.76–1.46)
TROPONIN I SERPL-MCNC: <0.02 NG/ML
TSH SERPL DL<=0.05 MIU/L-ACNC: 11.32 UIU/ML (ref 0.36–3.74)
WBC # BLD AUTO: 12.79 THOUSAND/UL (ref 4.31–10.16)

## 2018-07-02 PROCEDURE — 84443 ASSAY THYROID STIM HORMONE: CPT

## 2018-07-02 PROCEDURE — 82550 ASSAY OF CK (CPK): CPT

## 2018-07-02 PROCEDURE — 85025 COMPLETE CBC W/AUTO DIFF WBC: CPT

## 2018-07-02 PROCEDURE — 84484 ASSAY OF TROPONIN QUANT: CPT | Performed by: FAMILY MEDICINE

## 2018-07-02 PROCEDURE — 99214 OFFICE O/P EST MOD 30 MIN: CPT | Performed by: FAMILY MEDICINE

## 2018-07-02 PROCEDURE — 93000 ELECTROCARDIOGRAM COMPLETE: CPT | Performed by: FAMILY MEDICINE

## 2018-07-02 PROCEDURE — 84439 ASSAY OF FREE THYROXINE: CPT | Performed by: FAMILY MEDICINE

## 2018-07-02 PROCEDURE — 36415 COLL VENOUS BLD VENIPUNCTURE: CPT | Performed by: FAMILY MEDICINE

## 2018-07-02 PROCEDURE — 80053 COMPREHEN METABOLIC PANEL: CPT

## 2018-07-02 RX ORDER — LEVOTHYROXINE SODIUM 0.2 MG/1
200 TABLET ORAL DAILY
Qty: 30 TABLET | Refills: 0 | Status: SHIPPED | OUTPATIENT
Start: 2018-07-02 | End: 2018-08-08 | Stop reason: SDUPTHER

## 2018-07-02 NOTE — LETTER
July 2, 2018     Patient: Paul Robbins   YOB: 1975   Date of Visit: 7/2/2018       To Whom it May Concern: Paul Robbins is under my professional care  She was seen in my office on 7/2/2018  She may return to work on 7/3/2018  If you have any questions or concerns, please don't hesitate to call           Sincerely,          Arvind Farris DO        CC: No Recipients

## 2018-07-02 NOTE — PATIENT INSTRUCTIONS
Chest tightness, check labs and cxray  EKG done today NSR and no acute ST-T changes   Check labs for hypothyroidism  Consider cardiac cause for chest tightness and left arm numbness  Rec  [facility] consult for f-up  Await labs  Call if worse or ER  Stay out of heat  Lose weight via diet and exercise

## 2018-07-02 NOTE — PROGRESS NOTES
Assessment/Plan:  Chief Complaint   Patient presents with    Numbness     of the R arm again for over a week  Also noticing a chest tightness of the L side since Saturday night  Patient Instructions   Chest tightness, check labs and cxray  EKG done today NSR and no acute ST-T changes   Check labs for hypothyroidism  Consider cardiac cause for chest tightness and left arm numbness  Rec  [facility] consult for f-up  Await labs  Call if worse or ER  Stay out of heat  Lose weight via diet and exercise  No problem-specific Assessment & Plan notes found for this encounter  Diagnoses and all orders for this visit:    Chest tightness  -     CK (with reflex to MB); Future  -     Troponin I  -     Comprehensive metabolic panel; Future  -     CBC and differential; Future  -     XR chest pa & lateral; Future  -     POCT ECG    Arm numbness left  -     XR chest pa & lateral; Future  -     POCT ECG    Hypothyroidism, unspecified type  -     T4, free  -     TSH, 3rd generation; Future  -     levothyroxine 200 mcg tablet; Take 1 tablet (200 mcg total) by mouth daily    Obesity (BMI 30-39  9)          Subjective:      Patient ID: Jessica Soto is a 36 y o  female  Numbness (of the L arm again for over a week  Also noticing a chest tightness of the L side since Saturday night  ) Late last Saturday with chest tightness and left arm numbness that comes and goes  No jaw pain  Has some gurgling sound in epigastric area  Takes levothyroxine for thyroid issues  The following portions of the patient's history were reviewed and updated as appropriate: allergies, current medications, past family history, past medical history, past social history, past surgical history and problem list     Review of Systems   Constitutional: Negative  Obesity   HENT: Negative  Eyes: Negative  Respiratory: Positive for chest tightness (left side)  Cardiovascular: Negative      Gastrointestinal: Negative  Endocrine: Negative  Genitourinary: Negative  Musculoskeletal: Negative  Skin: Negative  Allergic/Immunologic: Negative  Neurological:        Left arm numbness comes and goes     Hematological: Negative  Psychiatric/Behavioral: Negative  Objective:      /76   Ht 5' 9 5" (1 765 m)   Wt 101 kg (222 lb)   BMI 32 31 kg/m²          Physical Exam   Constitutional: She is oriented to person, place, and time  She appears well-developed and well-nourished  obesity   HENT:   Head: Normocephalic and atraumatic  Right Ear: External ear normal    Left Ear: External ear normal    Nose: Nose normal    Mouth/Throat: Oropharynx is clear and moist    Eyes: Conjunctivae and EOM are normal  Pupils are equal, round, and reactive to light  Neck: Normal range of motion  Neck supple  Cardiovascular: Normal rate, regular rhythm, normal heart sounds and intact distal pulses  Pulmonary/Chest: Effort normal and breath sounds normal    Musculoskeletal: Normal range of motion  Neurological: She is alert and oriented to person, place, and time  She has normal reflexes  Skin: Skin is warm and dry  Psychiatric: She has a normal mood and affect   Her behavior is normal

## 2018-07-03 DIAGNOSIS — E03.9 HYPOTHYROIDISM, UNSPECIFIED TYPE: Primary | ICD-10-CM

## 2018-07-03 DIAGNOSIS — D72.829 LEUKOCYTOSIS, UNSPECIFIED TYPE: Primary | ICD-10-CM

## 2018-07-03 RX ORDER — LEVOTHYROXINE SODIUM 0.03 MG/1
25 TABLET ORAL DAILY
Qty: 30 TABLET | Refills: 2 | Status: SHIPPED | OUTPATIENT
Start: 2018-07-03 | End: 2018-10-15 | Stop reason: SDUPTHER

## 2018-07-05 DIAGNOSIS — E03.8 OTHER SPECIFIED HYPOTHYROIDISM: Primary | ICD-10-CM

## 2018-08-03 ENCOUNTER — OFFICE VISIT (OUTPATIENT)
Dept: HEMATOLOGY ONCOLOGY | Facility: CLINIC | Age: 43
End: 2018-08-03
Payer: COMMERCIAL

## 2018-08-03 VITALS
HEART RATE: 55 BPM | BODY MASS INDEX: 31.64 KG/M2 | TEMPERATURE: 97.8 F | SYSTOLIC BLOOD PRESSURE: 130 MMHG | HEIGHT: 70 IN | DIASTOLIC BLOOD PRESSURE: 84 MMHG | OXYGEN SATURATION: 99 % | RESPIRATION RATE: 18 BRPM | WEIGHT: 221 LBS

## 2018-08-03 DIAGNOSIS — Z12.31 ENCOUNTER FOR SCREENING MAMMOGRAM FOR BREAST CANCER: ICD-10-CM

## 2018-08-03 DIAGNOSIS — D72.829 LEUKOCYTOSIS, UNSPECIFIED TYPE: ICD-10-CM

## 2018-08-03 DIAGNOSIS — R71.8 MICROCYTOSIS: ICD-10-CM

## 2018-08-03 DIAGNOSIS — D72.823 LEUKEMOID REACTION: Primary | ICD-10-CM

## 2018-08-03 DIAGNOSIS — M54.12 CERVICAL RADICULOPATHY: ICD-10-CM

## 2018-08-03 PROCEDURE — 99244 OFF/OP CNSLTJ NEW/EST MOD 40: CPT | Performed by: INTERNAL MEDICINE

## 2018-08-03 NOTE — PROGRESS NOTES
8/3/2018    Dick Ladnon was seen in consultation today in regards to mild leukocytosis  She is 36years old and has noted increasing fatigue in the past 4 months  She complains of chest tightness and numbness and tingling of the left arm intermittently i e  about a few days at a time about 1 time per month in the past 6 months  Levothyroxine was increased from 200 mcg per day to 225 mcg per day this past month  Cardiology consultation has been arranged for August 9, 2018  Review of Systems:     General: Feels well, no chills or swaets  Head and Neck: No nosebleeds, no oral cavity or throat soreness  Cardiovascular: No lower extremity edema  Respriatory: No cough or dyspnea  GI: Appetite is good, no abdominal pain, bowel habits formed and regular  :  She notes nocturia 1-2 times  Menstrual periods have been regular with normal flow  Musculoskeletal:  She has mild chronic low back pain intermittently aggravated by more vigorous activities  No joint pain  Skin: No skin rash  Neurological: No headache, no numbness, no weakness  Hematologic: No easy bruising  Psychiatric: No emotional problems    Medications:    Current Outpatient Prescriptions   Medication Sig Dispense Refill    levothyroxine 200 mcg tablet Take 1 tablet (200 mcg total) by mouth daily 30 tablet 0    levothyroxine 25 mcg tablet Take 1 tablet (25 mcg total) by mouth daily 30 tablet 2    fluticasone (FLONASE) 50 mcg/act nasal spray 1 spray into each nostril daily 16 g 0     No current facility-administered medications for this visit  Past Medical History:    Hypothyroidism  Past Surgical History: Total thyroidectomy June 20, 2014  Tonsil and adenoid surgery at age 23  Family History:    Her mother age 79 has diabetes mellitus  Her father age 70 has diabetes mellitus  Her brother age 52 is in good health  Her son has diabetes mellitus  Social History:    She is  and lives with her       She has been a cigarette smoker 1 pack per day for 25 years  She is not an alcohol drinker  She has worked as an  in a [address] [place] [address] office  Physical Examination:    General appearance: Appears well  Head: Normocephalic  Eyes: Extraocular movements intact  Ears: No gross hearing deficit  Oropharynx: Clear  Neck: Supple, No lymphadenopathy  Chest: No axillary adenopathy (breast examination was not done today )  Lungs: Clear to auscultation bilaterally  Heart: Regular rate and rhythm  Abdomen: No hepatic or splenic enlargement; No inguinal  lymphadenopathy  Extremities: No lower extremity edema bilaterally  Skin: No rashes  Neurologic: Grossly intact, no focal neurological deficit  Psychiatric: Oriented to person, place and time, normal mood and affect    ECOG 1    Laboratory:     From February 24, 2015:  WBC 10 98 with ANC 6 92    From January 31, 2018:  WBC is 11 49 with ANC 7 45    From July 2, 2018:  TSH is 11 316, free T4 is 0 97 (0 76-1 46), WBC 12 79 with ANC 9 22 (1 85-7 62), hemoglobin 12 3 with MCV 79, platelets 955, WBC differential neutrophils 72%, lymphs 22%, monos 4%, eos 1%, creatinine 1 0, calcium 8 8, AST 17, ALT 17, alk-phos 87, bili 0 42  Assessment:    Leukocytosis, mild dating back at least to February 2015, there is mild neutrophilia  Most likely leukocytosis has resulted from cigarette smoking  There is no known underlying inflammatory condition or neoplastic disorder  The patient complains of excessive fatigue in the past 4 months and intermittent chest pressure in the past 6 months  Hypothyroidism has been corrected and cardiology consultation has already been arranged for August 9, 2018  A primary bone marrow disorder such as a myeloproliferative disorder is less likely  Recommendations:    Further evaluation is to begin with ESR to assess for underlying inflammatory condition    If ESR is significantly elevated without explanation then imaging of the chest would be recommended  JAK2 mutational analysis and bcr/ABL translocation by [facility] are recommended to assess for primary underlying bone marrow disorder  Cancer screening is to include fecal occult blood testing which is to be done in between menstrual periods so as to avoid contamination of the specimen with menstrual blood  Arrangements have ready been made for GYN evaluation on September 18, 2018  Mammogram is recommended for breast cancer screening  Cigarette smoking cessation is recommended primarily for the patient's general health, however, if leukocytosis were to resolve with cigarette smoking cessation then no further workup of the leukocytosis would be required  In the case of progressive leukocytosis without explanation then bone marrow examination would be recommended to assess for primary bone marrow disorder which could not be diagnosed otherwise  The patient is aware seek medical attention for more frequent left-sided chest pain or left upper extremity pain, excessive fatigue, or if other new problems arise  Otherwise, plan to see her again in 6 months

## 2018-08-03 NOTE — PATIENT INSTRUCTIONS
The patient is aware seek medical attention for more frequent left-sided chest pain or left upper extremity pain, excessive fatigue, or if other new problems arise

## 2018-08-08 DIAGNOSIS — E03.9 HYPOTHYROIDISM, UNSPECIFIED TYPE: ICD-10-CM

## 2018-08-08 RX ORDER — LEVOTHYROXINE SODIUM 0.2 MG/1
200 TABLET ORAL DAILY
Qty: 30 TABLET | Refills: 2 | Status: SHIPPED | OUTPATIENT
Start: 2018-08-08 | End: 2019-01-10 | Stop reason: SDUPTHER

## 2018-08-09 ENCOUNTER — OFFICE VISIT (OUTPATIENT)
Dept: CARDIOLOGY CLINIC | Facility: CLINIC | Age: 43
End: 2018-08-09
Payer: COMMERCIAL

## 2018-08-09 VITALS
BODY MASS INDEX: 32.52 KG/M2 | DIASTOLIC BLOOD PRESSURE: 68 MMHG | HEART RATE: 72 BPM | SYSTOLIC BLOOD PRESSURE: 98 MMHG | WEIGHT: 223.4 LBS

## 2018-08-09 DIAGNOSIS — R07.89 OTHER CHEST PAIN: Primary | ICD-10-CM

## 2018-08-09 PROCEDURE — 99203 OFFICE O/P NEW LOW 30 MIN: CPT | Performed by: INTERNAL MEDICINE

## 2018-08-09 NOTE — LETTER
2018     Antione Aranza, [address],[place] [address]     Patient: Amanda Tellez   YOB: 1975   Date of Visit: 2018       Dear Dr Moise Argueta: Thank you for referring Amanda Tellez to me for evaluation  Below are my notes for this consultation  If you have questions, please do not hesitate to call me  I look forward to following your patient along with you  Sincerely,        Mao Aburto MD        CC: No Recipients  Mao Aburto MD  2018  4:33 PM  Sign at close encounter  [address] [facility] [place]  [address]  [facility] [facility]  [phone]    Cardiology Follow up    Patient:  Amanda Tellez  :  1975  MRN:  [id number]    History of Present Illness:     58-year-old female with past medical history of hypothyroidism as well as tobacco use and leukocytosis presents as a new patient cardiology evaluation for chest and arm discomfort  She notices that after using her left arm to lift thing she gets some discomfort the travels from her left shoulder into her chest and also down her left arm which feels like some numbness and tingling in that area  This discomfort lasted few minutes typically  He is not precipitated by exertion in general-she can walk up many flights of stairs without any chest discomfort  The chest discomfort that she describes is only exacerbated after she uses her left arm at [address] her part-time job at [employer] or is lifting her laundry basket with her left arm  She notes no shortness of breath and denies any palpitations, dizziness, or syncope  She is  with 1 son  She works as an  for a [employer] and also has a part-time job with [employer]  She is active smoker but does not drink significant alcohol or use any drugs              Patient Active Problem List   Diagnosis    Cervical radiculopathy    Chronic lymphocytic thyroiditis    Hypothyroidism    Lumbar radiculopathy    Obesity    Sciatica    Leukemoid reaction       Past Surgical History  Past Surgical History:   Procedure Laterality Date    TONSILLECTOMY AND ADENOIDECTOMY      TOTAL THYROIDECTOMY N/A 06/21/2014    Dr Juan R Aguero History   Social History     Social History    Marital status: /Civil Union     Spouse name: N/A    Number of children: N/A    Years of education: N/A     Occupational History    Not on file  Social History Main Topics    Smoking status: Current Every Day Smoker    Smokeless tobacco: Never Used    Alcohol use Yes      Comment: occassional  / social per [employer]    Drug use: No    Sexual activity: Yes     Other Topics Concern    Not on file     Social History Narrative    No narrative on file        Allergies   Allergen Reactions    Lamisil [Terbinafine]     Prednisone      Rapid heart beat and palpitations        Family History   Family History   Problem Relation Age of Onset    Diabetes Son     Diabetes type II Mother     Hypertension Father         benign essential       Review of Systems:  Review of Systems   Constitutional: Negative for chills, fatigue and fever  HENT: Negative for hearing loss, nosebleeds and tinnitus  Eyes: Negative for pain  Respiratory: Positive for chest tightness  Negative for cough and shortness of breath  Cardiovascular: Negative for chest pain, palpitations and leg swelling  Gastrointestinal: Negative for abdominal pain, nausea and vomiting  Endocrine: Negative for cold intolerance and heat intolerance  Genitourinary: Negative for difficulty urinating, hematuria and vaginal bleeding  Musculoskeletal: Negative for arthralgias  Skin: Negative for rash  Allergic/Immunologic: Negative for environmental allergies  Neurological: Positive for numbness  Negative for dizziness, syncope, weakness and light-headedness     Psychiatric/Behavioral: Negative for decreased concentration and sleep disturbance  The patient is not nervous/anxious  Current Outpatient Prescriptions:     fluticasone (FLONASE) 50 mcg/act nasal spray, 1 spray into each nostril daily, Disp: 16 g, Rfl: 0    levothyroxine 200 mcg tablet, Take 1 tablet (200 mcg total) by mouth daily, Disp: 30 tablet, Rfl: 2    levothyroxine 25 mcg tablet, Take 1 tablet (25 mcg total) by mouth daily, Disp: 30 tablet, Rfl: 2     Physical Exam:    Vitals:    08/09/18 1557   BP: 98/68   BP Location: Right arm   Patient Position: Sitting   Cuff Size: Large   Pulse: 72   Weight: 101 kg (223 lb 6 4 oz)       Physical Exam   Constitutional: She is oriented to person, place, and time  She appears well-developed and well-nourished  HENT:   Head: Normocephalic  Right Ear: External ear normal    Left Ear: External ear normal    Mouth/Throat: Oropharynx is clear and moist    Eyes: Pupils are equal, round, and reactive to light  Neck: No JVD present  Carotid bruit is not present  Cardiovascular: Normal rate, regular rhythm and intact distal pulses  Exam reveals no gallop and no friction rub  No murmur heard  Pulmonary/Chest: Effort normal and breath sounds normal  No tachypnea  No respiratory distress  She has no wheezes  She has no rales  She exhibits no tenderness  Abdominal: Soft  She exhibits no distension  There is no tenderness  There is no rebound and no guarding  Musculoskeletal: She exhibits no edema  Neurological: She is alert and oriented to person, place, and time  Skin: Skin is warm and dry  Psychiatric: She has a normal mood and affect  Her behavior is normal  Judgment and thought content normal    Nursing note and vitals reviewed  Electrocardiogram from her primary doctor's office is normal     Labs:not applicable    Assessment/Plan:    1  Arm/chest discomfort-at this time I do not think that she has a cardiovascular etiology for her symptoms    I think is possible that she has a musculoskeletal or impingement related etiology for this discomfort  This symptom is not precipitated by exertion in general only by use of her left arm and is relieved by resting her left arm  I do not think she needs further cardiovascular testing at this time  2   I did  her regarding stopping tobacco     We can see her back on an as-needed basis if needed in the future      Raoul Lei MD  8/9/2018  4:11 PM

## 2018-08-09 NOTE — PROGRESS NOTES
[address] [facility] [place]  [address]  [facility], [address] [facility]  [phone]    Cardiology Follow up    Patient:  Vanessa Holliday  :  1975  MRN:  [id number]    History of Present Illness:     48-year-old female with past medical history of hypothyroidism as well as tobacco use and leukocytosis presents as a new patient cardiology evaluation for chest and arm discomfort  She notices that after using her left arm to lift thing she gets some discomfort the travels from her left shoulder into her chest and also down her left arm which feels like some numbness and tingling in that area  This discomfort lasted few minutes typically  He is not precipitated by exertion in general-she can walk up many flights of stairs without any chest discomfort  The chest discomfort that she describes is only exacerbated after she uses her left arm at [address] her part-time job at [employer] or is lifting her laundry basket with her left arm  She notes no shortness of breath and denies any palpitations, dizziness, or syncope  She is  with 1 son  She works as an  for [employer] and also has a part-time job with [employer]  She is active smoker but does not drink significant alcohol or use any drugs  Patient Active Problem List   Diagnosis    Cervical radiculopathy    Chronic lymphocytic thyroiditis    Hypothyroidism    Lumbar radiculopathy    Obesity    Sciatica    Leukemoid reaction       Past Surgical History  Past Surgical History:   Procedure Laterality Date    TONSILLECTOMY AND ADENOIDECTOMY      TOTAL THYROIDECTOMY N/A 2014    Dr Abbey Duran History   Social History     Social History    Marital status: /Civil Union     Spouse name: N/A    Number of children: N/A    Years of education: N/A     Occupational History    Not on file       Social History Main Topics    Smoking status: Current Every Day Smoker    Smokeless tobacco: Never Used    Alcohol use Yes      Comment: occassional  / social per [employer]    Drug use: No    Sexual activity: Yes     Other Topics Concern    Not on file     Social History Narrative    No narrative on file        Allergies   Allergen Reactions    Lamisil [Terbinafine]     Prednisone      Rapid heart beat and palpitations        Family History   Family History   Problem Relation Age of Onset    Diabetes Son     Diabetes type II Mother     Hypertension Father         benign essential       Review of Systems:  Review of Systems   Constitutional: Negative for chills, fatigue and fever  HENT: Negative for hearing loss, nosebleeds and tinnitus  Eyes: Negative for pain  Respiratory: Positive for chest tightness  Negative for cough and shortness of breath  Cardiovascular: Negative for chest pain, palpitations and leg swelling  Gastrointestinal: Negative for abdominal pain, nausea and vomiting  Endocrine: Negative for cold intolerance and heat intolerance  Genitourinary: Negative for difficulty urinating, hematuria and vaginal bleeding  Musculoskeletal: Negative for arthralgias  Skin: Negative for rash  Allergic/Immunologic: Negative for environmental allergies  Neurological: Positive for numbness  Negative for dizziness, syncope, weakness and light-headedness  Psychiatric/Behavioral: Negative for decreased concentration and sleep disturbance  The patient is not nervous/anxious            Current Outpatient Prescriptions:     fluticasone (FLONASE) 50 mcg/act nasal spray, 1 spray into each nostril daily, Disp: 16 g, Rfl: 0    levothyroxine 200 mcg tablet, Take 1 tablet (200 mcg total) by mouth daily, Disp: 30 tablet, Rfl: 2    levothyroxine 25 mcg tablet, Take 1 tablet (25 mcg total) by mouth daily, Disp: 30 tablet, Rfl: 2     Physical Exam:    Vitals:    08/09/18 1557   BP: 98/68   BP Location: Right arm   Patient Position: Sitting   Cuff Size: Large   Pulse: 72   Weight: 101 kg (223 lb 6 4 oz)       Physical Exam   Constitutional: She is oriented to person, place, and time  She appears well-developed and well-nourished  HENT:   Head: Normocephalic  Right Ear: External ear normal    Left Ear: External ear normal    Mouth/Throat: Oropharynx is clear and moist    Eyes: Pupils are equal, round, and reactive to light  Neck: No JVD present  Carotid bruit is not present  Cardiovascular: Normal rate, regular rhythm and intact distal pulses  Exam reveals no gallop and no friction rub  No murmur heard  Pulmonary/Chest: Effort normal and breath sounds normal  No tachypnea  No respiratory distress  She has no wheezes  She has no rales  She exhibits no tenderness  Abdominal: Soft  She exhibits no distension  There is no tenderness  There is no rebound and no guarding  Musculoskeletal: She exhibits no edema  Neurological: She is alert and oriented to person, place, and time  Skin: Skin is warm and dry  Psychiatric: She has a normal mood and affect  Her behavior is normal  Judgment and thought content normal    Nursing note and vitals reviewed  Electrocardiogram from her primary doctor's office is normal     Labs:not applicable    Assessment/Plan:    1  Arm/chest discomfort-at this time I do not think that she has a cardiovascular etiology for her symptoms  I think is possible that she has a musculoskeletal or impingement related etiology for this discomfort  This symptom is not precipitated by exertion in general only by use of her left arm and is relieved by resting her left arm  I do not think she needs further cardiovascular testing at this time  2   I did  her regarding stopping tobacco     We can see her back on an as-needed basis if needed in the future      Giovanni Trejo MD  8/9/2018  4:11 PM

## 2018-09-10 ENCOUNTER — TELEPHONE (OUTPATIENT)
Dept: FAMILY MEDICINE CLINIC | Facility: CLINIC | Age: 43
End: 2018-09-10

## 2018-09-10 DIAGNOSIS — J02.9 SORE THROAT: Primary | ICD-10-CM

## 2018-09-10 RX ORDER — AZITHROMYCIN 250 MG/1
TABLET, FILM COATED ORAL
Qty: 6 TABLET | Refills: 0 | Status: SHIPPED | OUTPATIENT
Start: 2018-09-10 | End: 2018-09-16

## 2018-09-10 NOTE — TELEPHONE ENCOUNTER
Pt called in stating that since Saturday she has had a sore throat and some congestion  Jessicaasif Murphy states that she has been using sudafed since Saturday and it is not helping her  I offered her an appointment with you today but she said she has 2 jobs to work today   Jessicaasif Murphy would like to know if you can please prescribe her something and send it to [facility]   CB# [phone]

## 2018-10-08 ENCOUNTER — OFFICE VISIT (OUTPATIENT)
Dept: FAMILY MEDICINE CLINIC | Facility: CLINIC | Age: 43
End: 2018-10-08
Payer: COMMERCIAL

## 2018-10-08 ENCOUNTER — APPOINTMENT (OUTPATIENT)
Dept: LAB | Facility: CLINIC | Age: 43
End: 2018-10-08
Payer: COMMERCIAL

## 2018-10-08 VITALS
DIASTOLIC BLOOD PRESSURE: 64 MMHG | BODY MASS INDEX: 31.32 KG/M2 | SYSTOLIC BLOOD PRESSURE: 110 MMHG | HEIGHT: 70 IN | WEIGHT: 218.8 LBS

## 2018-10-08 DIAGNOSIS — M54.12 CERVICAL RADICULOPATHY: ICD-10-CM

## 2018-10-08 DIAGNOSIS — E03.9 HYPOTHYROIDISM, UNSPECIFIED TYPE: ICD-10-CM

## 2018-10-08 DIAGNOSIS — R53.83 FATIGUE, UNSPECIFIED TYPE: ICD-10-CM

## 2018-10-08 DIAGNOSIS — M79.602 LEFT ARM PAIN: ICD-10-CM

## 2018-10-08 DIAGNOSIS — M54.12 CERVICAL RADICULOPATHY: Primary | ICD-10-CM

## 2018-10-08 DIAGNOSIS — R51.9 NONINTRACTABLE HEADACHE, UNSPECIFIED CHRONICITY PATTERN, UNSPECIFIED HEADACHE TYPE: ICD-10-CM

## 2018-10-08 LAB
ALBUMIN SERPL BCP-MCNC: 3.5 G/DL (ref 3.5–5)
ALP SERPL-CCNC: 97 U/L (ref 46–116)
ALT SERPL W P-5'-P-CCNC: 25 U/L (ref 12–78)
ANION GAP SERPL CALCULATED.3IONS-SCNC: 7 MMOL/L (ref 4–13)
AST SERPL W P-5'-P-CCNC: 16 U/L (ref 5–45)
BASOPHILS # BLD AUTO: 0.07 THOUSANDS/ΜL (ref 0–0.1)
BASOPHILS NFR BLD AUTO: 1 % (ref 0–1)
BILIRUB SERPL-MCNC: 0.36 MG/DL (ref 0.2–1)
BUN SERPL-MCNC: 8 MG/DL (ref 5–25)
CALCIUM SERPL-MCNC: 8.6 MG/DL (ref 8.3–10.1)
CHLORIDE SERPL-SCNC: 105 MMOL/L (ref 100–108)
CO2 SERPL-SCNC: 26 MMOL/L (ref 21–32)
CREAT SERPL-MCNC: 0.75 MG/DL (ref 0.6–1.3)
EOSINOPHIL # BLD AUTO: 0.18 THOUSAND/ΜL (ref 0–0.61)
EOSINOPHIL NFR BLD AUTO: 1 % (ref 0–6)
ERYTHROCYTE [DISTWIDTH] IN BLOOD BY AUTOMATED COUNT: 16.4 % (ref 11.6–15.1)
GFR SERPL CREATININE-BSD FRML MDRD: 98 ML/MIN/1.73SQ M
GLUCOSE SERPL-MCNC: 80 MG/DL (ref 65–140)
HCT VFR BLD AUTO: 40 % (ref 34.8–46.1)
HGB BLD-MCNC: 12.4 G/DL (ref 11.5–15.4)
IMM GRANULOCYTES # BLD AUTO: 0.04 THOUSAND/UL (ref 0–0.2)
IMM GRANULOCYTES NFR BLD AUTO: 0 % (ref 0–2)
LYMPHOCYTES # BLD AUTO: 3.5 THOUSANDS/ΜL (ref 0.6–4.47)
LYMPHOCYTES NFR BLD AUTO: 27 % (ref 14–44)
MCH RBC QN AUTO: 25.6 PG (ref 26.8–34.3)
MCHC RBC AUTO-ENTMCNC: 31 G/DL (ref 31.4–37.4)
MCV RBC AUTO: 83 FL (ref 82–98)
MONOCYTES # BLD AUTO: 0.85 THOUSAND/ΜL (ref 0.17–1.22)
MONOCYTES NFR BLD AUTO: 7 % (ref 4–12)
NEUTROPHILS # BLD AUTO: 8.51 THOUSANDS/ΜL (ref 1.85–7.62)
NEUTS SEG NFR BLD AUTO: 64 % (ref 43–75)
NRBC BLD AUTO-RTO: 0 /100 WBCS
PLATELET # BLD AUTO: 350 THOUSANDS/UL (ref 149–390)
PMV BLD AUTO: 10.8 FL (ref 8.9–12.7)
POTASSIUM SERPL-SCNC: 3.9 MMOL/L (ref 3.5–5.3)
PROT SERPL-MCNC: 7.1 G/DL (ref 6.4–8.2)
RBC # BLD AUTO: 4.85 MILLION/UL (ref 3.81–5.12)
SODIUM SERPL-SCNC: 138 MMOL/L (ref 136–145)
T4 FREE SERPL-MCNC: 1.3 NG/DL (ref 0.76–1.46)
TSH SERPL DL<=0.05 MIU/L-ACNC: 0.36 UIU/ML (ref 0.36–3.74)
WBC # BLD AUTO: 13.15 THOUSAND/UL (ref 4.31–10.16)

## 2018-10-08 PROCEDURE — 84439 ASSAY OF FREE THYROXINE: CPT | Performed by: FAMILY MEDICINE

## 2018-10-08 PROCEDURE — 84443 ASSAY THYROID STIM HORMONE: CPT

## 2018-10-08 PROCEDURE — 99214 OFFICE O/P EST MOD 30 MIN: CPT | Performed by: FAMILY MEDICINE

## 2018-10-08 PROCEDURE — 36415 COLL VENOUS BLD VENIPUNCTURE: CPT

## 2018-10-08 PROCEDURE — 80053 COMPREHEN METABOLIC PANEL: CPT

## 2018-10-08 PROCEDURE — 85025 COMPLETE CBC W/AUTO DIFF WBC: CPT

## 2018-10-08 RX ORDER — MELOXICAM 7.5 MG/1
7.5 TABLET ORAL DAILY
Qty: 30 TABLET | Refills: 0 | Status: SHIPPED | OUTPATIENT
Start: 2018-10-08 | End: 2018-11-04 | Stop reason: SDUPTHER

## 2018-10-08 NOTE — PROGRESS NOTES
Assessment/Plan:  Chief Complaint   Patient presents with    Arm Pain     tends to come and go for the last for months  Gets a tingling in the L hand/fingers  Over the weekend had a burning sensation of the L arm that would come and go   Headache     starting with a headache right now and feels like is in a fog  Noticing the L eye is twitching for the last 4 days now   Fatigue     Patient Instructions   Check labs as directed for thyroid and hx of hypothyroidism and fatigue and f-up with Heme/Onc as directed for elevated WBC  She also will use advil prn for left arm pain and rec  OAA for left arm pain and numbness and tingling  She can move arm above her head without complaints  Call if any problems  No problem-specific Assessment & Plan notes found for this encounter  Diagnoses and all orders for this visit:    Cervical radiculopathy  -     Comprehensive metabolic panel; Future  -     CBC and differential; Future  -     TSH, 3rd generation; Future  -     T4, free    Left arm pain  -     Comprehensive metabolic panel; Future  -     CBC and differential; Future  -     TSH, 3rd generation; Future  -     T4, free    Fatigue, unspecified type  -     Comprehensive metabolic panel; Future  -     CBC and differential; Future  -     TSH, 3rd generation; Future  -     T4, free    Nonintractable headache, unspecified chronicity pattern, unspecified headache type  -     Comprehensive metabolic panel; Future  -     CBC and differential; Future  -     TSH, 3rd generation; Future  -     T4, free    Hypothyroidism, unspecified type  -     Comprehensive metabolic panel; Future  -     CBC and differential; Future  -     TSH, 3rd generation; Future  -     T4, free          Subjective:      Patient ID: Tiffani Mortensen is a 36 y o  female  Here for left arm pain since January 2018  Pt  Has been seen by cardiologist which she says they cleared from cardiac standpoint  Pt   Saw Heme/onc in past for elevated WBC and has labs to be done also pt  Has hypothyroid and takes med as directed  Arm Pain      Headache      Fatigue   Associated symptoms include fatigue and headaches  The following portions of the patient's history were reviewed and updated as appropriate: allergies, current medications, past family history, past medical history, past social history, past surgical history and problem list     Review of Systems   Constitutional: Positive for fatigue  HENT: Negative  Eyes: Negative  Respiratory: Negative  Cardiovascular: Negative  Gastrointestinal: Negative  Endocrine: Negative  Genitourinary: Negative  Musculoskeletal: Negative  Skin: Negative  Allergic/Immunologic: Negative  Neurological: Positive for headaches  Hematological: Negative  Psychiatric/Behavioral: Negative  Objective:      /64   Ht 5' 9 5" (1 765 m)   Wt 99 2 kg (218 lb 12 8 oz)   BMI 31 85 kg/m²          Physical Exam   Constitutional: She is oriented to person, place, and time  She appears well-developed and well-nourished  HENT:   Head: Normocephalic and atraumatic  Right Ear: External ear normal    Left Ear: External ear normal    Nose: Nose normal    Mouth/Throat: Oropharynx is clear and moist    Eyes: Pupils are equal, round, and reactive to light  Conjunctivae and EOM are normal    Neck: Normal range of motion  Neck supple  Cardiovascular: Normal rate, regular rhythm, normal heart sounds and intact distal pulses  Pulmonary/Chest: Effort normal and breath sounds normal    Musculoskeletal: Normal range of motion  Left arm pain and radiation of pain and has numbness and tingling in left arm  Neurological: She is alert and oriented to person, place, and time  She has normal reflexes  Skin: Skin is warm and dry  Psychiatric: She has a normal mood and affect   Her behavior is normal

## 2018-10-08 NOTE — PATIENT INSTRUCTIONS
Check labs as directed for thyroid and hx of hypothyroidism and fatigue and f-up with Heme/Onc as directed for elevated WBC  She also will use advil prn for left arm pain and rec  OAA for left arm pain and numbness and tingling  She can move arm above her head without complaints  Call if any problems

## 2018-10-09 ENCOUNTER — TELEPHONE (OUTPATIENT)
Dept: FAMILY MEDICINE CLINIC | Facility: CLINIC | Age: 43
End: 2018-10-09

## 2018-10-12 ENCOUNTER — TELEPHONE (OUTPATIENT)
Dept: FAMILY MEDICINE CLINIC | Facility: CLINIC | Age: 43
End: 2018-10-12

## 2018-10-12 DIAGNOSIS — E03.9 HYPOTHYROIDISM, UNSPECIFIED TYPE: Primary | ICD-10-CM

## 2018-10-12 NOTE — TELEPHONE ENCOUNTER
lmm for patient to call back  Need to inform her to continue meds as they are and recheck thyroid in 3 months        lee ann

## 2018-10-15 DIAGNOSIS — E03.9 HYPOTHYROIDISM, UNSPECIFIED TYPE: ICD-10-CM

## 2018-10-15 RX ORDER — LEVOTHYROXINE SODIUM 0.03 MG/1
TABLET ORAL
Qty: 30 TABLET | Refills: 2 | Status: SHIPPED | OUTPATIENT
Start: 2018-10-15 | End: 2019-01-14 | Stop reason: SDUPTHER

## 2018-10-22 ENCOUNTER — TELEPHONE (OUTPATIENT)
Dept: FAMILY MEDICINE CLINIC | Facility: CLINIC | Age: 43
End: 2018-10-22

## 2018-10-22 NOTE — TELEPHONE ENCOUNTER
[facility] called to let you know that they have been trying to contact Jann Ang since October 9th and left numerous messages  She has not returned their calls

## 2018-11-04 DIAGNOSIS — R51.9 NONINTRACTABLE HEADACHE, UNSPECIFIED CHRONICITY PATTERN, UNSPECIFIED HEADACHE TYPE: ICD-10-CM

## 2018-11-04 DIAGNOSIS — M79.602 LEFT ARM PAIN: ICD-10-CM

## 2018-11-04 RX ORDER — MELOXICAM 7.5 MG/1
TABLET ORAL
Qty: 30 TABLET | Refills: 0 | Status: SHIPPED | OUTPATIENT
Start: 2018-11-04 | End: 2019-05-07

## 2018-12-03 ENCOUNTER — TELEPHONE (OUTPATIENT)
Dept: FAMILY MEDICINE CLINIC | Facility: CLINIC | Age: 43
End: 2018-12-03

## 2018-12-03 DIAGNOSIS — J02.9 SORE THROAT: ICD-10-CM

## 2018-12-03 DIAGNOSIS — H92.09 EARACHE: Primary | ICD-10-CM

## 2018-12-03 RX ORDER — AZITHROMYCIN 250 MG/1
TABLET, FILM COATED ORAL
Qty: 6 TABLET | Refills: 0 | Status: SHIPPED | OUTPATIENT
Start: 2018-12-03 | End: 2018-12-07

## 2018-12-03 NOTE — TELEPHONE ENCOUNTER
Patient has a sore throat and ear pain    Can a zpack be called into [facility]    RW#[phone]

## 2019-01-10 ENCOUNTER — OFFICE VISIT (OUTPATIENT)
Dept: FAMILY MEDICINE CLINIC | Facility: CLINIC | Age: 44
End: 2019-01-10
Payer: COMMERCIAL

## 2019-01-10 VITALS
DIASTOLIC BLOOD PRESSURE: 80 MMHG | SYSTOLIC BLOOD PRESSURE: 130 MMHG | TEMPERATURE: 98.8 F | WEIGHT: 220 LBS | BODY MASS INDEX: 31.5 KG/M2 | HEIGHT: 70 IN

## 2019-01-10 DIAGNOSIS — J02.9 SORE THROAT: ICD-10-CM

## 2019-01-10 DIAGNOSIS — J32.9 SINUSITIS, UNSPECIFIED CHRONICITY, UNSPECIFIED LOCATION: Primary | ICD-10-CM

## 2019-01-10 DIAGNOSIS — H92.01 RIGHT EAR PAIN: ICD-10-CM

## 2019-01-10 DIAGNOSIS — E03.9 HYPOTHYROIDISM, UNSPECIFIED TYPE: ICD-10-CM

## 2019-01-10 DIAGNOSIS — H69.91 EUSTACHIAN TUBE DISORDER, RIGHT: ICD-10-CM

## 2019-01-10 DIAGNOSIS — E66.9 OBESITY (BMI 30-39.9): ICD-10-CM

## 2019-01-10 PROCEDURE — 99214 OFFICE O/P EST MOD 30 MIN: CPT | Performed by: FAMILY MEDICINE

## 2019-01-10 PROCEDURE — 3008F BODY MASS INDEX DOCD: CPT | Performed by: FAMILY MEDICINE

## 2019-01-10 RX ORDER — AZITHROMYCIN 250 MG/1
TABLET, FILM COATED ORAL
Qty: 6 TABLET | Refills: 0 | Status: SHIPPED | OUTPATIENT
Start: 2019-01-10 | End: 2019-01-15

## 2019-01-10 RX ORDER — FLUTICASONE PROPIONATE 50 MCG
2 SPRAY, SUSPENSION (ML) NASAL DAILY
Qty: 1 BOTTLE | Refills: 1 | Status: SHIPPED | OUTPATIENT
Start: 2019-01-10 | End: 2019-05-07

## 2019-01-10 NOTE — PROGRESS NOTES
Assessment/Plan:  Chief Complaint   Patient presents with    Sore Throat     with R ear pain; started tuesday night     Patient Instructions   Rest and fluids, start abx and flonase generic and rec  Dimetapp DM cold and cough prn  No problem-specific Assessment & Plan notes found for this encounter  Diagnoses and all orders for this visit:    Sinusitis, unspecified chronicity, unspecified location  -     azithromycin (ZITHROMAX) 250 mg tablet; Take 2 tablets today then 1 tablet daily x 4 days  -     fluticasone (FLONASE) 50 mcg/act nasal spray; 2 sprays into each nostril daily    Eustachian tube disorder, right  -     fluticasone (FLONASE) 50 mcg/act nasal spray; 2 sprays into each nostril daily    Sore throat  -     azithromycin (ZITHROMAX) 250 mg tablet; Take 2 tablets today then 1 tablet daily x 4 days    Right ear pain  -     azithromycin (ZITHROMAX) 250 mg tablet; Take 2 tablets today then 1 tablet daily x 4 days    Obesity (BMI 30-39  9)          Subjective:      Patient ID: Ann-Marie Whittaker is a 39 y o  female  Sore Throat (with R ear pain; started tuesday night), no other complaints and some lightheadedness  The following portions of the patient's history were reviewed and updated as appropriate: allergies, current medications, past family history, past medical history, past social history, past surgical history and problem list     Review of Systems   Constitutional: Negative  HENT: Positive for ear pain and sore throat  Eyes: Negative  Respiratory: Negative  Cardiovascular: Negative  Gastrointestinal: Negative  Endocrine: Negative  Genitourinary: Negative  Musculoskeletal: Negative  Skin: Negative  Allergic/Immunologic: Negative  Neurological: Positive for light-headedness  Hematological: Negative  Psychiatric/Behavioral: Negative            Objective:      /80 (BP Location: Left arm, Patient Position: Sitting, Cuff Size: Standard)   Temp 98 8 °F (37 1 °C) (Tympanic)   Ht 5' 9 5" (1 765 m)   Wt 99 8 kg (220 lb)   BMI 32 02 kg/m²          Physical Exam   Constitutional: She is oriented to person, place, and time  She appears well-developed and well-nourished  HENT:   Head: Normocephalic and atraumatic  Right Ear: External ear normal    Left Ear: External ear normal    Nasal congestion and pnd and right ear congestion   Eyes: Pupils are equal, round, and reactive to light  Conjunctivae and EOM are normal    Neck: Normal range of motion  Neck supple  Cardiovascular: Normal rate, regular rhythm, normal heart sounds and intact distal pulses  Pulmonary/Chest: Effort normal and breath sounds normal    Musculoskeletal: Normal range of motion  Neurological: She is alert and oriented to person, place, and time  She has normal reflexes  Skin: Skin is warm and dry  Psychiatric: She has a normal mood and affect   Her behavior is normal

## 2019-01-11 RX ORDER — LEVOTHYROXINE SODIUM 0.2 MG/1
200 TABLET ORAL DAILY
Qty: 30 TABLET | Refills: 2 | Status: SHIPPED | OUTPATIENT
Start: 2019-01-11 | End: 2019-05-04 | Stop reason: SDUPTHER

## 2019-01-14 DIAGNOSIS — E03.9 HYPOTHYROIDISM, UNSPECIFIED TYPE: ICD-10-CM

## 2019-01-14 RX ORDER — LEVOTHYROXINE SODIUM 0.03 MG/1
TABLET ORAL
Qty: 30 TABLET | Refills: 2 | Status: SHIPPED | OUTPATIENT
Start: 2019-01-14 | End: 2019-05-04 | Stop reason: SDUPTHER

## 2019-01-25 ENCOUNTER — TELEPHONE (OUTPATIENT)
Dept: FAMILY MEDICINE CLINIC | Facility: CLINIC | Age: 44
End: 2019-01-25

## 2019-01-25 DIAGNOSIS — J06.9 UPPER RESPIRATORY TRACT INFECTION, UNSPECIFIED TYPE: Primary | ICD-10-CM

## 2019-01-25 RX ORDER — AZITHROMYCIN 250 MG/1
TABLET, FILM COATED ORAL
Qty: 6 TABLET | Refills: 0 | Status: SHIPPED | OUTPATIENT
Start: 2019-01-25 | End: 2019-01-30

## 2019-01-25 NOTE — TELEPHONE ENCOUNTER
Patient still has ear and throat pain    Can she have a zpack sent to [address] [place] [address]

## 2019-05-04 DIAGNOSIS — E03.9 HYPOTHYROIDISM, UNSPECIFIED TYPE: ICD-10-CM

## 2019-05-06 RX ORDER — LEVOTHYROXINE SODIUM 0.2 MG/1
200 TABLET ORAL DAILY
Qty: 30 TABLET | Refills: 2 | Status: SHIPPED | OUTPATIENT
Start: 2019-05-06 | End: 2019-08-15 | Stop reason: SDUPTHER

## 2019-05-06 RX ORDER — LEVOTHYROXINE SODIUM 0.03 MG/1
TABLET ORAL
Qty: 30 TABLET | Refills: 2 | Status: SHIPPED | OUTPATIENT
Start: 2019-05-06 | End: 2019-08-15 | Stop reason: SDUPTHER

## 2019-05-07 ENCOUNTER — APPOINTMENT (OUTPATIENT)
Dept: LAB | Facility: CLINIC | Age: 44
End: 2019-05-07
Payer: COMMERCIAL

## 2019-05-07 ENCOUNTER — OFFICE VISIT (OUTPATIENT)
Dept: FAMILY MEDICINE CLINIC | Facility: CLINIC | Age: 44
End: 2019-05-07
Payer: COMMERCIAL

## 2019-05-07 VITALS
SYSTOLIC BLOOD PRESSURE: 122 MMHG | BODY MASS INDEX: 32.29 KG/M2 | HEIGHT: 69 IN | WEIGHT: 218 LBS | DIASTOLIC BLOOD PRESSURE: 80 MMHG

## 2019-05-07 DIAGNOSIS — R10.13 EPIGASTRIC PAIN: ICD-10-CM

## 2019-05-07 DIAGNOSIS — E66.9 OBESITY (BMI 30-39.9): ICD-10-CM

## 2019-05-07 DIAGNOSIS — K21.9 GASTROESOPHAGEAL REFLUX DISEASE, ESOPHAGITIS PRESENCE NOT SPECIFIED: ICD-10-CM

## 2019-05-07 DIAGNOSIS — K29.70 GASTRITIS, PRESENCE OF BLEEDING UNSPECIFIED, UNSPECIFIED CHRONICITY, UNSPECIFIED GASTRITIS TYPE: ICD-10-CM

## 2019-05-07 DIAGNOSIS — K21.9 GASTROESOPHAGEAL REFLUX DISEASE, ESOPHAGITIS PRESENCE NOT SPECIFIED: Primary | ICD-10-CM

## 2019-05-07 DIAGNOSIS — E03.9 HYPOTHYROIDISM, UNSPECIFIED TYPE: ICD-10-CM

## 2019-05-07 LAB
ALBUMIN SERPL BCP-MCNC: 3.6 G/DL (ref 3.5–5)
ALP SERPL-CCNC: 98 U/L (ref 46–116)
ALT SERPL W P-5'-P-CCNC: 21 U/L (ref 12–78)
ANION GAP SERPL CALCULATED.3IONS-SCNC: 6 MMOL/L (ref 4–13)
AST SERPL W P-5'-P-CCNC: 12 U/L (ref 5–45)
BASOPHILS # BLD AUTO: 0.1 THOUSANDS/ΜL (ref 0–0.1)
BASOPHILS NFR BLD AUTO: 1 % (ref 0–1)
BILIRUB SERPL-MCNC: 0.38 MG/DL (ref 0.2–1)
BILIRUB UR QL STRIP: NEGATIVE
BUN SERPL-MCNC: 9 MG/DL (ref 5–25)
CALCIUM SERPL-MCNC: 8.3 MG/DL (ref 8.3–10.1)
CHLORIDE SERPL-SCNC: 105 MMOL/L (ref 100–108)
CLARITY UR: NORMAL
CO2 SERPL-SCNC: 27 MMOL/L (ref 21–32)
COLOR UR: NORMAL
CREAT SERPL-MCNC: 0.9 MG/DL (ref 0.6–1.3)
EOSINOPHIL # BLD AUTO: 0.17 THOUSAND/ΜL (ref 0–0.61)
EOSINOPHIL NFR BLD AUTO: 1 % (ref 0–6)
ERYTHROCYTE [DISTWIDTH] IN BLOOD BY AUTOMATED COUNT: 16.1 % (ref 11.6–15.1)
GFR SERPL CREATININE-BSD FRML MDRD: 78 ML/MIN/1.73SQ M
GLUCOSE SERPL-MCNC: 90 MG/DL (ref 65–140)
GLUCOSE UR STRIP-MCNC: NEGATIVE MG/DL
HCT VFR BLD AUTO: 41.7 % (ref 34.8–46.1)
HGB BLD-MCNC: 12.8 G/DL (ref 11.5–15.4)
HGB UR QL STRIP.AUTO: NEGATIVE
IMM GRANULOCYTES # BLD AUTO: 0.09 THOUSAND/UL (ref 0–0.2)
IMM GRANULOCYTES NFR BLD AUTO: 1 % (ref 0–2)
KETONES UR STRIP-MCNC: NEGATIVE MG/DL
LEUKOCYTE ESTERASE UR QL STRIP: NEGATIVE
LYMPHOCYTES # BLD AUTO: 2.82 THOUSANDS/ΜL (ref 0.6–4.47)
LYMPHOCYTES NFR BLD AUTO: 17 % (ref 14–44)
MCH RBC QN AUTO: 25.5 PG (ref 26.8–34.3)
MCHC RBC AUTO-ENTMCNC: 30.7 G/DL (ref 31.4–37.4)
MCV RBC AUTO: 83 FL (ref 82–98)
MONOCYTES # BLD AUTO: 0.95 THOUSAND/ΜL (ref 0.17–1.22)
MONOCYTES NFR BLD AUTO: 6 % (ref 4–12)
NEUTROPHILS # BLD AUTO: 12.65 THOUSANDS/ΜL (ref 1.85–7.62)
NEUTS SEG NFR BLD AUTO: 74 % (ref 43–75)
NITRITE UR QL STRIP: NEGATIVE
NRBC BLD AUTO-RTO: 0 /100 WBCS
PH UR STRIP.AUTO: 5.5 [PH]
PLATELET # BLD AUTO: 397 THOUSANDS/UL (ref 149–390)
PMV BLD AUTO: 10.7 FL (ref 8.9–12.7)
POTASSIUM SERPL-SCNC: 4.1 MMOL/L (ref 3.5–5.3)
PROT SERPL-MCNC: 7.2 G/DL (ref 6.4–8.2)
PROT UR STRIP-MCNC: NEGATIVE MG/DL
RBC # BLD AUTO: 5.02 MILLION/UL (ref 3.81–5.12)
SODIUM SERPL-SCNC: 138 MMOL/L (ref 136–145)
SP GR UR STRIP.AUTO: 1.02 (ref 1–1.03)
T4 FREE SERPL-MCNC: 1.23 NG/DL (ref 0.76–1.46)
TSH SERPL DL<=0.05 MIU/L-ACNC: 2.21 UIU/ML (ref 0.36–3.74)
UROBILINOGEN UR QL STRIP.AUTO: 0.2 E.U./DL
WBC # BLD AUTO: 16.78 THOUSAND/UL (ref 4.31–10.16)

## 2019-05-07 PROCEDURE — 84443 ASSAY THYROID STIM HORMONE: CPT

## 2019-05-07 PROCEDURE — 80053 COMPREHEN METABOLIC PANEL: CPT

## 2019-05-07 PROCEDURE — 99214 OFFICE O/P EST MOD 30 MIN: CPT | Performed by: FAMILY MEDICINE

## 2019-05-07 PROCEDURE — 84439 ASSAY OF FREE THYROXINE: CPT | Performed by: FAMILY MEDICINE

## 2019-05-07 PROCEDURE — 81003 URINALYSIS AUTO W/O SCOPE: CPT | Performed by: FAMILY MEDICINE

## 2019-05-07 PROCEDURE — 36415 COLL VENOUS BLD VENIPUNCTURE: CPT

## 2019-05-07 PROCEDURE — 85025 COMPLETE CBC W/AUTO DIFF WBC: CPT

## 2019-05-07 RX ORDER — OMEPRAZOLE 20 MG/1
20 CAPSULE, DELAYED RELEASE ORAL
Qty: 30 CAPSULE | Refills: 1 | Status: SHIPPED | OUTPATIENT
Start: 2019-05-07 | End: 2019-06-03 | Stop reason: SDUPTHER

## 2019-05-20 ENCOUNTER — OFFICE VISIT (OUTPATIENT)
Dept: FAMILY MEDICINE CLINIC | Facility: CLINIC | Age: 44
End: 2019-05-20
Payer: COMMERCIAL

## 2019-05-20 VITALS
HEIGHT: 69 IN | WEIGHT: 216.6 LBS | OXYGEN SATURATION: 99 % | SYSTOLIC BLOOD PRESSURE: 122 MMHG | TEMPERATURE: 98.7 F | DIASTOLIC BLOOD PRESSURE: 82 MMHG | BODY MASS INDEX: 32.08 KG/M2 | HEART RATE: 71 BPM

## 2019-05-20 DIAGNOSIS — K29.70 GASTRITIS WITHOUT BLEEDING, UNSPECIFIED CHRONICITY, UNSPECIFIED GASTRITIS TYPE: ICD-10-CM

## 2019-05-20 DIAGNOSIS — E03.9 HYPOTHYROIDISM, UNSPECIFIED TYPE: ICD-10-CM

## 2019-05-20 DIAGNOSIS — R10.9 ABDOMINAL PAIN, UNSPECIFIED ABDOMINAL LOCATION: Primary | ICD-10-CM

## 2019-05-20 DIAGNOSIS — K21.9 GASTROESOPHAGEAL REFLUX DISEASE, ESOPHAGITIS PRESENCE NOT SPECIFIED: ICD-10-CM

## 2019-05-20 DIAGNOSIS — R07.9 CHEST PAIN, UNSPECIFIED TYPE: ICD-10-CM

## 2019-05-20 DIAGNOSIS — Z72.0 TOBACCO ABUSE: ICD-10-CM

## 2019-05-20 DIAGNOSIS — D72.829 LEUKOCYTOSIS, UNSPECIFIED TYPE: ICD-10-CM

## 2019-05-20 PROCEDURE — 99214 OFFICE O/P EST MOD 30 MIN: CPT | Performed by: FAMILY MEDICINE

## 2019-05-22 ENCOUNTER — HOSPITAL ENCOUNTER (OUTPATIENT)
Dept: RADIOLOGY | Facility: HOSPITAL | Age: 44
Discharge: HOME | End: 2019-05-22
Payer: COMMERCIAL

## 2019-05-22 DIAGNOSIS — R07.9 CHEST PAIN, UNSPECIFIED TYPE: ICD-10-CM

## 2019-05-22 DIAGNOSIS — R10.9 ABDOMINAL PAIN, UNSPECIFIED ABDOMINAL LOCATION: ICD-10-CM

## 2019-05-22 PROCEDURE — 71046 X-RAY EXAM CHEST 2 VIEWS: CPT

## 2019-05-28 ENCOUNTER — TELEPHONE (OUTPATIENT)
Dept: FAMILY MEDICINE CLINIC | Facility: CLINIC | Age: 44
End: 2019-05-28

## 2019-05-28 DIAGNOSIS — R10.9 ABDOMINAL PAIN, UNSPECIFIED ABDOMINAL LOCATION: Primary | ICD-10-CM

## 2019-06-03 ENCOUNTER — OFFICE VISIT (OUTPATIENT)
Dept: FAMILY MEDICINE CLINIC | Facility: CLINIC | Age: 44
End: 2019-06-03
Payer: COMMERCIAL

## 2019-06-03 VITALS
OXYGEN SATURATION: 99 % | TEMPERATURE: 98.3 F | SYSTOLIC BLOOD PRESSURE: 118 MMHG | WEIGHT: 218.6 LBS | HEART RATE: 84 BPM | BODY MASS INDEX: 32.38 KG/M2 | DIASTOLIC BLOOD PRESSURE: 80 MMHG | HEIGHT: 69 IN

## 2019-06-03 DIAGNOSIS — E66.09 CLASS 1 OBESITY DUE TO EXCESS CALORIES WITHOUT SERIOUS COMORBIDITY WITH BODY MASS INDEX (BMI) OF 32.0 TO 32.9 IN ADULT: Primary | ICD-10-CM

## 2019-06-03 DIAGNOSIS — R07.9 CHEST PAIN, UNSPECIFIED TYPE: ICD-10-CM

## 2019-06-03 DIAGNOSIS — M79.602 LEFT ARM PAIN: ICD-10-CM

## 2019-06-03 DIAGNOSIS — R10.13 MIDEPIGASTRIC PAIN: ICD-10-CM

## 2019-06-03 DIAGNOSIS — R10.13 EPIGASTRIC PAIN: ICD-10-CM

## 2019-06-03 DIAGNOSIS — K21.9 GASTROESOPHAGEAL REFLUX DISEASE, ESOPHAGITIS PRESENCE NOT SPECIFIED: ICD-10-CM

## 2019-06-03 DIAGNOSIS — Z72.0 TOBACCO ABUSE: ICD-10-CM

## 2019-06-03 DIAGNOSIS — K29.70 GASTRITIS, PRESENCE OF BLEEDING UNSPECIFIED, UNSPECIFIED CHRONICITY, UNSPECIFIED GASTRITIS TYPE: ICD-10-CM

## 2019-06-03 PROCEDURE — 99214 OFFICE O/P EST MOD 30 MIN: CPT | Performed by: FAMILY MEDICINE

## 2019-06-03 PROCEDURE — 3008F BODY MASS INDEX DOCD: CPT | Performed by: FAMILY MEDICINE

## 2019-06-03 RX ORDER — OMEPRAZOLE 20 MG/1
20 CAPSULE, DELAYED RELEASE ORAL
Qty: 30 CAPSULE | Refills: 1 | Status: SHIPPED | OUTPATIENT
Start: 2019-06-03 | End: 2019-09-03 | Stop reason: SDUPTHER

## 2019-06-04 ENCOUNTER — TELEPHONE (OUTPATIENT)
Dept: FAMILY MEDICINE CLINIC | Facility: CLINIC | Age: 44
End: 2019-06-04

## 2019-06-07 ENCOUNTER — CONSULT (OUTPATIENT)
Dept: SURGERY | Facility: CLINIC | Age: 44
End: 2019-06-07
Payer: COMMERCIAL

## 2019-06-07 VITALS
WEIGHT: 216.4 LBS | DIASTOLIC BLOOD PRESSURE: 76 MMHG | RESPIRATION RATE: 16 BRPM | SYSTOLIC BLOOD PRESSURE: 112 MMHG | HEART RATE: 76 BPM | TEMPERATURE: 97.2 F | BODY MASS INDEX: 31.96 KG/M2

## 2019-06-07 DIAGNOSIS — R10.9 ABDOMINAL PAIN, UNSPECIFIED ABDOMINAL LOCATION: ICD-10-CM

## 2019-06-07 PROCEDURE — 99243 OFF/OP CNSLTJ NEW/EST LOW 30: CPT | Performed by: PHYSICIAN ASSISTANT

## 2019-06-07 NOTE — H&P (VIEW-ONLY)
Assessment/Plan:   Chris Manning is a 39 y  o female who is here for The encounter diagnosis was Abdominal pain, unspecified abdominal location  Patient with left-sided abdominal pain that radiates to the left shoulder and down left arm  Pain is aggravated by eating  Abdominal pain waxes and wanes  Left arm pain last about 2 hours and resolved on its own Patient started on omeprazole 3 weeks ago without improvement  Patient saw cardiologist a year ago without any finding  Denies any associated symptoms    Patient is an everyday smoker    Plan:  CT scan abdomen pelvis  Upper endoscopy and colonoscopy to evaluate left-sided abdominal pain  Look for ulcer disease or H pylori  Preoperative Clearance: None          ______________________________________________________  CC:Abdominal Pain (Abdominal pain for about one month  Pain after eating )    HPI:  Chris Manning is a 39 y  o female who was referred for evaluation of Abdominal Pain (Abdominal pain for about one month  Pain after eating )    Currently left sided abdominal pain worse after eating  Pain radiates down left arm  Pain resolved on its own after about 2 hours  Patient started on omeprazole without improvement  Denies change in bowel habits, nausea or vomiting  No family history of colon cancer    Mother with family history of gallbladder disease  ROS:  General ROS: negative  negative for - chills, fatigue, fever or night sweats, weight loss  Respiratory ROS: no cough, shortness of breath, or wheezing  Cardiovascular ROS: no chest pain or dyspnea on exertion  Genito-Urinary ROS: no dysuria, trouble voiding, or hematuria  Musculoskeletal ROS: negative for - gait disturbance, joint pain or muscle pain  Neurological ROS: no TIA or stroke symptoms  Gastrointestinal: see HPI  No abdominal pain, melena, BRB unless specified in HPI  Skin ROS: no new rashes or lesions   Lymphatic ROS: no new adenopathy noted by pt     GYN ROS: see HPI, no new GYN history or bleeding noted  Psy ROS: no new mental or behavioral disturbances       Patient Active Problem List   Diagnosis    Cervical radiculopathy    Chronic lymphocytic thyroiditis    Hypothyroidism    Lumbar radiculopathy    Obesity    Sciatica    Leukemoid reaction    Other chest pain         Allergies:  Lamisil [terbinafine] and Prednisone      Current Outpatient Medications:     levothyroxine 200 mcg tablet, TAKE 1 TABLET (200 MCG TOTAL) BY MOUTH DAILY, Disp: 30 tablet, Rfl: 2    levothyroxine 25 mcg tablet, TAKE 1 TABLET BY MOUTH EVERY DAY, Disp: 30 tablet, Rfl: 2    omeprazole (PriLOSEC) 20 mg delayed release capsule, Take 1 capsule (20 mg total) by mouth daily before breakfast, Disp: 30 capsule, Rfl: 1    Past Medical History:   Diagnosis Date    Abnormal Pap smear of cervix     with ASUS favoring benign, last assessed 1/9/14       Past Surgical History:   Procedure Laterality Date    TONSILLECTOMY AND ADENOIDECTOMY      TOTAL THYROIDECTOMY N/A 06/21/2014    Dr Georgia Valladares       Family History   Problem Relation Age of Onset    Diabetes Son     Diabetes type II Mother     Hypertension Father         benign essential        reports that she has been smoking  She has never used smokeless tobacco  She reports that she drinks alcohol  She reports that she does not use drugs  Labs:   Lab Results   Component Value Date    WBC 16 78 (H) 05/07/2019    HGB 12 8 05/07/2019    HCT 41 7 05/07/2019    MCV 83 05/07/2019     (H) 05/07/2019     Lab Results   Component Value Date     02/24/2015    K 4 1 05/07/2019     05/07/2019    CO2 27 05/07/2019    ANIONGAP 7 02/24/2015    BUN 9 05/07/2019    CREATININE 0 90 05/07/2019    GLUCOSE 104 02/24/2015    CALCIUM 8 3 05/07/2019    AST 12 05/07/2019    ALT 21 05/07/2019    ALKPHOS 98 05/07/2019    PROT 8 0 01/20/2014    BILITOT 0 2 01/20/2014    EGFR 78 05/07/2019         Imaging: No new pertinent imaging studies  PHYSICAL EXAM    Vitals:    06/07/19 0838   BP: 112/76   Pulse: 76   Resp: 16   Temp: (!) 97 2 °F (36 2 °C)          PHYSICAL EXAM  General Appearance:    Alert, cooperative, no distress, healthy   Head:    Normocephalic without obvious abnormality   Eyes:    PERRL, conjunctiva/corneas clear, EOM's intact        Neck:   Supple, no adenopathy, no JVD   Back:     Symmetric, no spinal or CVA tenderness   Lungs:     Clear to auscultation bilaterally, no wheezing or rhonchi   Heart:    Regular rate and rhythm, S1 and S2 normal, no murmur   Abdomen:     Soft, NTND, +BS   Extremities:   Extremities normal  No clubbing, cyanosis or edema   Psych:   Normal Affect   Neurologic:   CNII-XII intact  Strength symmetric, speech intact                                           Some portions of this record may have been generated with voice recognition software  There may be translation, syntax,  or grammatical errors  Occasional wrong word or "sound-a-like" substitutions may have occurred due to the inherent limitations of the voice recognition software  Read the chart carefully and recognize, using context, where substitutions may have occurred  If you have any questions, please contact the dictating provider for clarification or correction, as needed  This encounter has been coded by a non-certified coder         Geno Early MD    Date: 6/7/2019 Time: 9:03 AM

## 2019-06-26 ENCOUNTER — HOSPITAL ENCOUNTER (OUTPATIENT)
Dept: CT IMAGING | Facility: HOSPITAL | Age: 44
Discharge: HOME | End: 2019-06-26
Attending: SURGERY
Payer: COMMERCIAL

## 2019-06-26 DIAGNOSIS — R10.9 ABDOMINAL PAIN, UNSPECIFIED ABDOMINAL LOCATION: ICD-10-CM

## 2019-06-26 PROCEDURE — 74177 CT ABD & PELVIS W/CONTRAST: CPT

## 2019-06-26 RX ADMIN — IOHEXOL 100 ML: 350 INJECTION, SOLUTION INTRAVENOUS at 20:30

## 2019-07-01 ENCOUNTER — ANESTHESIA EVENT (OUTPATIENT)
Dept: GASTROENTEROLOGY | Facility: HOSPITAL | Age: 44
End: 2019-07-01

## 2019-07-02 ENCOUNTER — ANESTHESIA (OUTPATIENT)
Dept: GASTROENTEROLOGY | Facility: HOSPITAL | Age: 44
End: 2019-07-02

## 2019-07-02 ENCOUNTER — HOSPITAL ENCOUNTER (OUTPATIENT)
Dept: GASTROENTEROLOGY | Facility: HOSPITAL | Age: 44
Setting detail: OUTPATIENT SURGERY
Discharge: HOME | End: 2019-07-02
Attending: SURGERY | Admitting: SURGERY
Payer: COMMERCIAL

## 2019-07-02 VITALS
RESPIRATION RATE: 20 BRPM | WEIGHT: 216 LBS | DIASTOLIC BLOOD PRESSURE: 55 MMHG | HEIGHT: 69 IN | BODY MASS INDEX: 31.99 KG/M2 | TEMPERATURE: 97.8 F | SYSTOLIC BLOOD PRESSURE: 105 MMHG | HEART RATE: 64 BPM | OXYGEN SATURATION: 96 %

## 2019-07-02 DIAGNOSIS — R10.9 ABDOMINAL PAIN, UNSPECIFIED ABDOMINAL LOCATION: ICD-10-CM

## 2019-07-02 LAB
EXT PREGNANCY TEST URINE: NEGATIVE
EXT. CONTROL: NORMAL

## 2019-07-02 PROCEDURE — NC001 PR NO CHARGE: Performed by: SURGERY

## 2019-07-02 PROCEDURE — 88305 TISSUE EXAM BY PATHOLOGIST: CPT | Performed by: PATHOLOGY

## 2019-07-02 PROCEDURE — 81025 URINE PREGNANCY TEST: CPT | Performed by: ANESTHESIOLOGY

## 2019-07-02 PROCEDURE — 43239 EGD BIOPSY SINGLE/MULTIPLE: CPT | Performed by: SURGERY

## 2019-07-02 PROCEDURE — 45380 COLONOSCOPY AND BIOPSY: CPT | Performed by: SURGERY

## 2019-07-02 RX ORDER — PROPOFOL 10 MG/ML
INJECTION, EMULSION INTRAVENOUS AS NEEDED
Status: DISCONTINUED | OUTPATIENT
Start: 2019-07-02 | End: 2019-07-02 | Stop reason: SURG

## 2019-07-02 RX ORDER — SODIUM CHLORIDE 9 MG/ML
125 INJECTION, SOLUTION INTRAVENOUS CONTINUOUS
Status: DISCONTINUED | OUTPATIENT
Start: 2019-07-02 | End: 2019-07-06 | Stop reason: HOSPADM

## 2019-07-02 RX ADMIN — PROPOFOL 50 MG: 10 INJECTION, EMULSION INTRAVENOUS at 09:08

## 2019-07-02 RX ADMIN — PROPOFOL 25 MG: 10 INJECTION, EMULSION INTRAVENOUS at 09:24

## 2019-07-02 RX ADMIN — PROPOFOL 50 MG: 10 INJECTION, EMULSION INTRAVENOUS at 09:01

## 2019-07-02 RX ADMIN — PROPOFOL 25 MG: 10 INJECTION, EMULSION INTRAVENOUS at 09:20

## 2019-07-02 RX ADMIN — PROPOFOL 50 MG: 10 INJECTION, EMULSION INTRAVENOUS at 09:02

## 2019-07-02 RX ADMIN — PROPOFOL 50 MG: 10 INJECTION, EMULSION INTRAVENOUS at 09:00

## 2019-07-02 RX ADMIN — PROPOFOL 50 MG: 10 INJECTION, EMULSION INTRAVENOUS at 09:10

## 2019-07-02 RX ADMIN — LIDOCAINE HYDROCHLORIDE 100 MG: 20 INJECTION, SOLUTION INTRAVENOUS at 08:10

## 2019-07-02 RX ADMIN — PROPOFOL 50 MG: 10 INJECTION, EMULSION INTRAVENOUS at 09:15

## 2019-07-02 RX ADMIN — PROPOFOL 50 MG: 10 INJECTION, EMULSION INTRAVENOUS at 09:05

## 2019-07-02 RX ADMIN — SODIUM CHLORIDE 125 ML/HR: 0.9 INJECTION, SOLUTION INTRAVENOUS at 08:07

## 2019-07-02 NOTE — DISCHARGE INSTRUCTIONS
[facility]  Endoscopy Post-Operative Instructions  Dr Anna Canales MD, FACS    Procedure: Colonoscopy and EGD    Findings:  Diverticulosis, Hemorrhoids and Colon Polyp(s)     Also small to moderate hiatal hernia which may be contributing to year left upper quadrant pain  Follow up with her surgeon to consider workup of this, for primary care physician to moderate your medication  Follow-Up: You will need a repeat Endoscopy in (generally)5 years  Will await final pathology report for final determination of number of years until your follow up endoscopy, if you had polyps on this exam   Different types of polyps require different lengths of follow up surveillance  Please call our office or your primary doctor's office if you have any questions, once the report is returned  You should have an endoscopy sooner than recommended if you have any symptoms of bleeding or change in stools or other concerns  You will receive a call from our office with your results, in addition to the the preliminary results you received today  You will usually receive a follow-up letter from our office in 1-2 weeks  Call the office if you do not hear from us  You are welcome to also schedule an office visit if desired to discuss the results further  It is your responsibility to contact our office for results in 1- 2 weeks if you do not hear from us  If a follow up endoscopy is needed, you are responsible for arranging that follow up appointment at the appropriate time  The office may or may not issue a reminder at that future time  Please take responsibility for your own follow up healthcare  Diet: Eat a light snack first, and then resume your previous diet  Call the office if you have unusual fevers, chills, nausea or vomiting or abdominal pain  Report to the emergency room with these are severe in nature        Activity: Do not drive a car, operate machinery, or sign legal documents for 24 hours after your procedure  Normal activity may be resumed on the day following the procedure  Call the office at [phone] for any of the following: Severe abdominal pain, significant rectal bleeding, chills, or fever above 100°, new onset of persistent cough or persistent vomiting  [facility] [address]   Phone: [phone]                                    Gastritis   WHAT YOU NEED TO KNOW:   Gastritis is inflammation or irritation of the lining of your stomach  DISCHARGE INSTRUCTIONS:   Call 911 for any of the following:   · You develop chest pain or shortness of breath  Seek care immediately if:   · You vomit blood  · You have black or bloody bowel movements  · You have severe stomach or back pain  Contact your healthcare provider if:   · You have a fever  · You have new or worsening symptoms, even after treatment  · You have questions or concerns about your condition or care  Medicines:   · Medicines  may be given to help treat a bacterial infection or decrease stomach acid  · Take your medicine as directed  Contact your healthcare provider if you think your medicine is not helping or if you have side effects  Tell him or her if you are allergic to any medicine  Keep a list of the medicines, vitamins, and herbs you take  Include the amounts, and when and why you take them  Bring the list or the pill bottles to follow-up visits  Carry your medicine list with you in case of an emergency  Manage or prevent gastritis:   · Do not smoke  Nicotine and other chemicals in cigarettes and cigars can make your symptoms worse and cause lung damage  Ask your healthcare provider for information if you currently smoke and need help to quit  E-cigarettes or smokeless tobacco still contain nicotine  Talk to your healthcare provider before you use these products  · Do not drink alcohol    Alcohol can prevent healing and make your gastritis worse  Talk to your healthcare provider if you need help to stop drinking  · Do not take NSAIDs or aspirin unless directed  These and similar medicines can cause irritation  If your healthcare provider says it is okay to take NSAIDs, take them with food  · Do not eat foods that cause irritation  Foods such as oranges and salsa can cause burning or pain  Eat a variety of healthy foods  Examples include fruits (not citrus), vegetables, low-fat dairy products, beans, whole-grain breads, and lean meats and fish  Try to eat small meals, and drink water with your meals  Do not eat for at least 3 hours before you go to bed  · Find ways to relax and decrease stress  Stress can increase stomach acid and make gastritis worse  Activities such as yoga, meditation, or listening to music can help you relax  Spend time with friends, or do things you enjoy  Follow up with your healthcare provider as directed: You may need ongoing tests or treatment, or referral to a gastroenterologist  Write down your questions so you remember to ask them during your visits  © 2017 [address] [facility] Information is for End User's use only and may not be sold, redistributed or otherwise used for commercial purposes  All illustrations and images included in CareNotes® are the copyrighted property of A D A M , Inc  or Gaston Shearer  The above information is an  only  It is not intended as medical advice for individual conditions or treatments  Talk to your doctor, nurse or pharmacist before following any medical regimen to see if it is safe and effective for you  Hiatal Hernia   WHAT YOU NEED TO KNOW:   A hiatal hernia is a condition that causes part of your stomach to bulge through the hiatus (small opening) in your diaphragm  The part of the stomach may move up and down, or it may get trapped above the diaphragm          DISCHARGE INSTRUCTIONS:   Seek care immediately if:   · You have severe abdominal pain  · You try to vomit but nothing comes out (retching)  · You have severe chest pain and sudden trouble breathing  · Your bowel movements are black or bloody  · Your vomit looks like coffee grounds or has blood in it  Contact your healthcare provider if:   · Your symptoms are getting worse  · You have nausea, and you are vomiting  · You are losing weight without trying  · You have questions or concerns about your condition or care  Medicines:   · Medicines  may be given to relieve heartburn symptoms  These medicines help to decrease or block stomach acid  You may also be given medicines that help to tighten the esophageal sphincter  · Take your medicine as directed  Contact your healthcare provider if you think your medicine is not helping or if you have side effects  Tell him or her if you are allergic to any medicine  Keep a list of the medicines, vitamins, and herbs you take  Include the amounts, and when and why you take them  Bring the list or the pill bottles to follow-up visits  Carry your medicine list with you in case of an emergency  Follow up with your healthcare provider as directed:  Write down your questions so you remember to ask them during your visits  Self care:   · Avoid foods that make your symptoms worse  These may include spicy foods, fruit juices, alcohol, caffeine, chocolate, and mint  · Eat several small meals during the day  Small meals give your stomach less food to digest     · Avoid lying down and bending forward after you eat  Do not eat meals 2 to 3 hours before bedtime  This decreases your risk for reflux  · Maintain a healthy weight  If you are overweight, weight loss may help relieve your symptoms  · Sleep with your head elevated  at least 6 inches  · Do not smoke  Smoking can increase your symptoms of heartburn    © 2017 [employer] Information is for End User's use only and may not be sold, redistributed or otherwise used for commercial purposes  All illustrations and images included in CareNotes® are the copyrighted property of A D A M , Inc  or Gaston Shearer  The above information is an  only  It is not intended as medical advice for individual conditions or treatments  Talk to your doctor, nurse or pharmacist before following any medical regimen to see if it is safe and effective for you  Upper Endoscopy   WHAT YOU NEED TO KNOW:   An upper endoscopy is also called an upper gastrointestinal (GI) endoscopy, or an esophagogastroduodenoscopy (EGD)  You may feel bloated, gassy, or have some abdominal discomfort after your procedure  Your throat may be sore for 24 to 36 hours  You may burp or pass gas from air that is still inside your body  DISCHARGE INSTRUCTIONS:   Call 911 for any of the following:   · You have sudden chest pain or trouble breathing  Seek care immediately if:   · You feel dizzy or faint  · You have trouble swallowing  · Your bowel movements are very dark or black  · Your abdomen is hard and firm and you have severe pain  · You vomit blood  Contact your healthcare provider if:   · You feel full or bloated and cannot burp or pass gas  · You have not had a bowel movement for 3 days after your procedure  · You have neck pain  · You have a fever or chills  · You have nausea or are vomiting  · You have a rash or hives  · You have questions or concerns about your endoscopy  Relieve a sore throat:  Suck on throat lozenges or crushed ice  Gargle with a small amount of warm salt water  Mix 1 teaspoon of salt and 1 cup of warm water to make salt water  Relieve gas and discomfort from bloating:  Lie on your right side with a heating pad on your abdomen  Take short walks to help pass gas  Eat small meals until bloating is relieved  Rest after your procedure: You have been given medicine to relax you   Do not  drive or make important decisions until the day after your procedure  Return to your normal activity as directed  You can usually return to work the day after your procedure  Follow up with your healthcare provider as directed:  Write down your questions so you remember to ask them during your visits  © 2017 [address] is for End User's use only and may not be sold, redistributed or otherwise used for commercial purposes  All illustrations and images included in CareNotes® are the copyrighted property of A D A M , Inc  or Gaston Shearer  The above information is an  only  It is not intended as medical advice for individual conditions or treatments  Talk to your doctor, nurse or pharmacist before following any medical regimen to see if it is safe and effective for you  Colorectal Polyps   WHAT YOU NEED TO KNOW:   Colorectal polyps are small growths of tissue in the lining of the colon and rectum  Most polyps are hyperplastic polyps and are usually benign (noncancerous)  Certain types of polyps, called adenomatous polyps, may turn into cancer  DISCHARGE INSTRUCTIONS:   Follow up with your healthcare provider or gastroenterologist as directed: You may need to return for more tests, such as another colonoscopy  Write down your questions so you remember to ask them during your visits  Reduce your risk for colorectal polyps:   · Eat a variety of healthy foods:  Healthy foods include fruit, vegetables, whole-grain breads, low-fat dairy products, beans, lean meat, and fish  Ask if you need to be on a special diet  · Maintain a healthy weight:  Ask your healthcare provider if you need to lose weight and how much you need to lose  Ask for help with a weight loss program     · Exercise:  Begin to exercise slowly and do more as you get stronger   Talk with your healthcare provider before you start an exercise program      · Limit alcohol:  Your risk for polyps increases the more you drink  · Do not smoke: If you smoke, it is never too late to quit  Ask for information about how to stop  For support and more information:   · Royce Chandler ([facility])  [address] , [place] [address]  Phone: [phone]  Web Address: www digestive  [facility]  Contact your healthcare provider or gastroenterologist if:   · You have a fever  · You have chills, a cough, or feel weak and achy  · You have abdominal pain that does not go away or gets worse after you take medicine  · Your abdomen is swollen  · You are losing weight without trying  · You have questions or concerns about your condition or care  Seek care immediately or call 911 if:   · You have sudden shortness of breath  · You have a fast heart rate, fast breathing, or are too dizzy to stand up  · You have severe abdominal pain  · You see blood in your bowel movement  © 2017 [address] [facility] Information is for End User's use only and may not be sold, redistributed or otherwise used for commercial purposes  All illustrations and images included in CareNotes® are the copyrighted property of A D A M , Inc  or Gaston Shearer  The above information is an  only  It is not intended as medical advice for individual conditions or treatments  Talk to your doctor, nurse or pharmacist before following any medical regimen to see if it is safe and effective for you  Colonoscopy   WHAT YOU NEED TO KNOW:   A colonoscopy is a procedure to examine the inside of your colon (intestine) with a scope  Polyps or tissue growths may have been removed during your colonoscopy  It is normal to feel bloated and to have some abdominal discomfort  You should be passing gas  If you have hemorrhoids or you had polyps removed, you may have a small amount of bleeding         DISCHARGE INSTRUCTIONS:   Seek care immediately if: · You have a large amount of bright red blood in your bowel movements  · Your abdomen is hard and firm and you have severe pain  · You have sudden trouble breathing  Contact your healthcare provider if:   · You develop a rash or hives  · You have a fever within 24 hours of your procedure       · You have not had a bowel movement for 3 days after your procedure  · You have questions or concerns about your condition or care  Activity:   · Do not lift, strain, or run  for 3 days after your procedure  · Rest after your procedure  You have been given medicine to relax you  Do not  drive or make important decisions until the day after your procedure  Return to your normal activity as directed  · Relieve gas and discomfort from bloating  by lying on your right side with a heating pad on your abdomen  You may need to take short walks to help the gas move out  Eat small meals until bloating is relieved  If you had polyps removed: For 7 days after your procedure:  · Do not  take aspirin  · Do not  go on long car rides  Follow up with your healthcare provider as directed:  Write down your questions so you remember to ask them during your visits  © 2017 [address] is for End User's use only and may not be sold, redistributed or otherwise used for commercial purposes  All illustrations and images included in CareNotes® are the copyrighted property of A D A M , Inc  or [employer]  The above information is an  only  It is not intended as medical advice for individual conditions or treatments  Talk to your doctor, nurse or pharmacist before following any medical regimen to see if it is safe and effective for you  Diverticulosis   WHAT YOU NEED TO KNOW:   Diverticulosis is a condition that causes small pockets called diverticula to form in your intestine   These pockets make it difficult for bowel movements to pass through your digestive system  DISCHARGE INSTRUCTIONS:   Seek care immediately if:   · You have severe pain on the left side of your lower abdomen  · Your bowel movements are bright or dark red  Contact your healthcare provider if:   · You have a fever and chills  · You feel dizzy or lightheaded  · You have nausea, or you are vomiting  · You have a change in your bowel movements  · You have questions or concerns about your condition or care  Medicines:   · Medicines  to soften your bowel movements may be given  You may also need medicines to treat symptoms such as bloating and pain  · Take your medicine as directed  Contact your healthcare provider if you think your medicine is not helping or if you have side effects  Tell him or her if you are allergic to any medicine  Keep a list of the medicines, vitamins, and herbs you take  Include the amounts, and when and why you take them  Bring the list or the pill bottles to follow-up visits  Carry your medicine list with you in case of an emergency  Self-care: The goal of treatment is to manage any symptoms you have and prevent other problems such as diverticulitis  Diverticulitis is swelling or infection of the diverticula  Your healthcare provider may recommend any of the following:  · Eat a variety of high-fiber foods  High-fiber foods help you have regular bowel movements  High-fiber foods include cooked beans, fruits, vegetables, and some cereals  Most adults need 25 to 35 grams of fiber each day  Your healthcare provider may recommend that you have more  Ask your healthcare provider how much fiber you need  Increase fiber slowly  You may have abdominal discomfort, bloating, and gas if you add fiber to your diet too quickly  You may need to take a fiber supplement if you are not getting enough fiber from food  · Drink liquids as directed  You may need to drink 2 to 3 liters (8 to 12 cups) of liquids every day   Ask your healthcare provider how much liquid to drink each day and which liquids are best for you  · Apply heat  on your abdomen for 20 to 30 minutes every 2 hours for as many days as directed  Heat helps decrease pain and muscle spasms  Help prevent diverticulitis or other symptoms: The following may help decrease your risk for diverticulitis or symptoms, such as bleeding  Talk to your provider about these or other things you can do to prevent problems that may occur with diverticulosis  · Exercise regularly  Ask your healthcare provider about the best exercise plan for you  Exercise can help you have regular bowel movements  Get 30 minutes of exercise on most days of the week  · Maintain a healthy weight  Ask your healthcare provider how much you should weigh  Ask him or her to help you create a weight loss plan if you are overweight  · Do not smoke  Nicotine and other chemicals in cigarettes increase your risk for diverticulitis  Ask your healthcare provider for information if you currently smoke and need help to quit  E-cigarettes or smokeless tobacco still contain nicotine  Talk to your healthcare provider before you use these products  · Ask your healthcare provider if it is safe to take NSAIDs  NSAIDs may increase your risk of diverticulitis  Follow up with your healthcare provider as directed:  Write down your questions so you remember to ask them during your visits  © 2017 [address] [facility] Information is for End User's use only and may not be sold, redistributed or otherwise used for commercial purposes  All illustrations and images included in CareNotes® are the copyrighted property of [employer] [place] [employer]  or Gaston Shearer  The above information is an  only  It is not intended as medical advice for individual conditions or treatments  Talk to your doctor, nurse or pharmacist before following any medical regimen to see if it is safe and effective for you        Diverticulosis Diet   WHAT YOU NEED TO KNOW:   What is a diverticulosis diet? A diverticulosis diet includes high-fiber foods  High-fiber foods help you have regular bowel movements  Extra fiber may decrease your risk of forming new diverticula (small pockets) in your intestine  A high-fiber diet may also help prevent diverticulitis  Diverticulitis is a painful condition that occurs when diverticula become inflamed or infected  You do not need to avoid nuts, seeds, corn, or popcorn while you are on a diverticulosis diet  How much fiber do I need? You may need 25 to 35 grams of fiber each day  Ask your dietitian or healthcare provider how much fiber you should have  Increase your intake of fiber slowly  When you eat more fiber, you may have gas and feel bloated  You may need to take a fiber supplement if you do not get enough fiber from food  Drink plenty of liquids as you increase the fiber in your diet  Your dietitian or healthcare provider may recommend 8 eight-ounce cups or more each day  Ask which liquids are best for you  Which foods are high in fiber? · Foods with at least 4 grams of fiber per serving:      ¨ ? to ½ cup of high-fiber cereal (check the nutrition label on the box)    ¨ ½ cup of blackberries or raspberries    ¨ 4 dried prunes    ¨ 1 cooked artichoke    ¨ ½ cup of cooked legumes, such as lentils, or red, kidney, and woodward beans    · Foods with 1 to 3 grams of fiber per serving:      ¨ 1 slice of whole-wheat, pumpernickel, or rye bread    ¨ 4 whole-wheat crackers    ¨ ½ cup of cereal with 1 to 3 grams of fiber per serving (check the nutrition label on the box)    ¨ 1 piece of fruit, such as an apple, banana, pear, kiwi, or orange    ¨ 3 dates    ¨ ½ cup of canned apricots, fruit cocktail, peaches, or pears    ¨ ½ cup of raw or cooked vegetables, such as carrots, cauliflower, cabbage, spinach, squash, or corn  When should I contact my healthcare provider? · You have questions about a high-fiber diet      · You have a change in your bowel movements  · You have an upset stomach  · You have a fever  · You have pain in your lower abdomen on the left side  · You have questions about your condition or care  CARE AGREEMENT:   You have the right to help plan your care  Learn about your health condition and how it may be treated  Discuss treatment options with your caregivers to decide what care you want to receive  You always have the right to refuse treatment  The above information is an  only  It is not intended as medical advice for individual conditions or treatments  Talk to your doctor, nurse or pharmacist before following any medical regimen to see if it is safe and effective for you  © 2017 [address]  Information is for End User's use only and may not be sold, redistributed or otherwise used for commercial purposes  All illustrations and images included in CareNotes® are the copyrighted property of [email or web address]  or Gaston Shearer  Hemorrhoids   WHAT YOU NEED TO KNOW:   Hemorrhoids are swollen blood vessels inside your rectum (internal hemorrhoids) or on your anus (external hemorrhoids)  Sometimes a hemorrhoid may prolapse  This means it extends out of your anus  DISCHARGE INSTRUCTIONS:   Seek care immediately if:   · You have severe pain in your rectum or around your anus  · You have severe pain in your abdomen and you are vomiting  · You have bleeding from your anus that soaks through your underwear  Contact your healthcare provider if:   · You have frequent and painful bowel movements  · Your hemorrhoid looks or feels more swollen than usual      · You do not have a bowel movement for 2 days or more  · You see or feel tissue coming through your anus  · You have questions or concerns about your condition or care  Medicines: You may  need any of the following:  · Medicine  may be given to decrease pain, swelling, and itching   The medicine may come as a pad, cream, or ointment  · Stool softeners  help treat or prevent constipation  · NSAIDs , such as ibuprofen, help decrease swelling, pain, and fever  NSAIDs can cause stomach bleeding or kidney problems in certain people  If you take blood thinner medicine, always ask your healthcare provider if NSAIDs are safe for you  Always read the medicine label and follow directions  · Take your medicine as directed  Contact your healthcare provider if you think your medicine is not helping or if you have side effects  Tell him or her if you are allergic to any medicine  Keep a list of the medicines, vitamins, and herbs you take  Include the amounts, and when and why you take them  Bring the list or the pill bottles to follow-up visits  Carry your medicine list with you in case of an emergency  Manage your symptoms:   · Apply ice on your anus for 15 to 20 minutes every hour or as directed  Use an ice pack, or put crushed ice in a plastic bag  Cover it with a towel before you apply it to your anus  Ice helps prevent tissue damage and decreases swelling and pain  · Take a sitz bath  Fill a bathtub with 4 to 6 inches of warm water  You may also use a sitz bath pan that fits inside a toilet bowl  Sit in the sitz bath for 15 minutes  Do this 3 times a day, and after each bowel movement  The warm water can help decrease pain and swelling  · Keep your anal area clean  Gently wash the area with warm water daily  Soap may irritate the area  After a bowel movement, wipe with moist towelettes or wet toilet paper  Dry toilet paper can irritate the area  Prevent hemorrhoids:   · Do not strain to have a bowel movement  Do not sit on the toilet too long  These actions can increase pressure on the tissues in your rectum and anus  · Drink plenty of liquids  Liquids can help prevent constipation  Ask how much liquid to drink each day and which liquids are best for you       · Eat a variety of high-fiber foods  Examples include fruits, vegetables, and whole grains  Ask your healthcare provider how much fiber you need each day  You may need to take a fiber supplement  · Exercise as directed  Exercise, such as walking, may make it easier to have a bowel movement  Ask your healthcare provider to help you create an exercise plan  · Do not have anal sex  Anal sex can weaken the skin around your rectum and anus  · Avoid heavy lifting  This can cause straining and increase your risk for another hemorrhoid  Follow up with your healthcare provider as directed:  Write down your questions so you remember to ask them during your visits  © 2017 [address] [employer] Information is for End User's use only and may not be sold, redistributed or otherwise used for commercial purposes  All illustrations and images included in CareNotes® are the copyrighted property of [employer]  or Gaston Shearer  The above information is an  only  It is not intended as medical advice for individual conditions or treatments  Talk to your doctor, nurse or pharmacist before following any medical regimen to see if it is safe and effective for you

## 2019-07-02 NOTE — ANESTHESIA PREPROCEDURE EVALUATION
Review of Systems/Medical History  Patient summary reviewed  Chart reviewed  No history of anesthetic complications     Cardiovascular   Pulmonary  Smoker cigarette smoker  ,        GI/Hepatic    GERD well controlled, Bowel prep            Endo/Other  History of thyroid disease , hypothyroidism,   Obesity    GYN  Negative gynecology ROS          Hematology  Negative hematology ROS      Musculoskeletal  Back pain , cervical pain and lumbar pain, Sciatica,        Neurology  Negative neurology ROS      Psychology   Negative psychology ROS              Physical Exam    Airway    Mallampati score: II  TM Distance: >3 FB  Neck ROM: full     Dental   No notable dental hx     Cardiovascular  Rhythm: regular, Rate: normal, Cardiovascular exam normal    Pulmonary  Pulmonary exam normal Breath sounds clear to auscultation,     Other Findings        Anesthesia Plan  ASA Score- 2     Anesthesia Type- IV sedation with anesthesia with ASA Monitors  Additional Monitors:   Airway Plan:         Plan Factors-Patient not instructed to abstain from smoking on day of procedure  Patient did not smoke on day of surgery  Induction- intravenous  Postoperative Plan-     Informed Consent- Anesthetic plan and risks discussed with patient  I personally reviewed this patient with the CRNA  Discussed and agreed on the Anesthesia Plan with the CRNA  Marielle Bhatia

## 2019-07-03 ENCOUNTER — TELEPHONE (OUTPATIENT)
Dept: SURGERY | Facility: CLINIC | Age: 44
End: 2019-07-03

## 2019-07-03 NOTE — TELEPHONE ENCOUNTER
S/P Colono = 7/2/19    Called and spoke to patient  Patient reports no F/V/N/C  Patient has not yet had a bowel movement but has been passing gas  She's aware that if this becomes an issue or concern, she should contact the office  Patient is aware of small hiatal hernia found on EGD  Patient will await pathology and trial of PPIs prior to scheduling appt with Dr Romaine Anne to discuss possible surgical intervention  Patient is aware that she'll be called when pathology has been finalized and verified  Patient is aware that she'll receive recommended follow up packet via [address] mail  Path pending

## 2019-07-08 NOTE — RESULT ENCOUNTER NOTE
Please call patient with these results  Only hyperplastic or non polyps found  These are generally benign with minimal to no pre malignant potential   Generally 7-10 year follow-up is recommended  This may be modified by family history or symptoms before that time frame  History of polyps in even though these are hyperplastic will continue with 5 year recommendation

## 2019-08-15 DIAGNOSIS — E03.9 HYPOTHYROIDISM, UNSPECIFIED TYPE: ICD-10-CM

## 2019-08-15 RX ORDER — LEVOTHYROXINE SODIUM 0.03 MG/1
TABLET ORAL
Qty: 30 TABLET | Refills: 2 | Status: SHIPPED | OUTPATIENT
Start: 2019-08-15 | End: 2019-09-10

## 2019-08-15 RX ORDER — LEVOTHYROXINE SODIUM 0.2 MG/1
200 TABLET ORAL DAILY
Qty: 30 TABLET | Refills: 2 | Status: SHIPPED | OUTPATIENT
Start: 2019-08-15 | End: 2019-11-15 | Stop reason: SDUPTHER

## 2019-09-03 ENCOUNTER — OFFICE VISIT (OUTPATIENT)
Dept: FAMILY MEDICINE CLINIC | Facility: CLINIC | Age: 44
End: 2019-09-03
Payer: COMMERCIAL

## 2019-09-03 VITALS
DIASTOLIC BLOOD PRESSURE: 74 MMHG | OXYGEN SATURATION: 97 % | WEIGHT: 213.6 LBS | HEIGHT: 70 IN | SYSTOLIC BLOOD PRESSURE: 120 MMHG | BODY MASS INDEX: 30.58 KG/M2 | TEMPERATURE: 98.5 F | HEART RATE: 76 BPM

## 2019-09-03 DIAGNOSIS — R68.83 CHILLS: ICD-10-CM

## 2019-09-03 DIAGNOSIS — K21.9 GASTROESOPHAGEAL REFLUX DISEASE, ESOPHAGITIS PRESENCE NOT SPECIFIED: ICD-10-CM

## 2019-09-03 DIAGNOSIS — R05.9 COUGH: ICD-10-CM

## 2019-09-03 DIAGNOSIS — R10.13 EPIGASTRIC PAIN: ICD-10-CM

## 2019-09-03 DIAGNOSIS — Z72.0 TOBACCO ABUSE: ICD-10-CM

## 2019-09-03 DIAGNOSIS — E03.9 HYPOTHYROIDISM, UNSPECIFIED TYPE: ICD-10-CM

## 2019-09-03 DIAGNOSIS — R50.9 FEVER, UNSPECIFIED FEVER CAUSE: Primary | ICD-10-CM

## 2019-09-03 DIAGNOSIS — K29.70 GASTRITIS, PRESENCE OF BLEEDING UNSPECIFIED, UNSPECIFIED CHRONICITY, UNSPECIFIED GASTRITIS TYPE: ICD-10-CM

## 2019-09-03 DIAGNOSIS — E66.9 OBESITY (BMI 30-39.9): ICD-10-CM

## 2019-09-03 PROCEDURE — 99214 OFFICE O/P EST MOD 30 MIN: CPT | Performed by: FAMILY MEDICINE

## 2019-09-03 RX ORDER — AZITHROMYCIN 250 MG/1
TABLET, FILM COATED ORAL
Qty: 6 TABLET | Refills: 0 | Status: SHIPPED | OUTPATIENT
Start: 2019-09-03 | End: 2019-09-08

## 2019-09-03 RX ORDER — OMEPRAZOLE 20 MG/1
20 CAPSULE, DELAYED RELEASE ORAL
Qty: 30 CAPSULE | Refills: 5 | Status: SHIPPED | OUTPATIENT
Start: 2019-09-03 | End: 2019-10-15

## 2019-09-03 NOTE — PATIENT INSTRUCTIONS
Start abx as directed and take thyroid med as directed, stay well hydrated and use Tylenol or advil prn fever or achiness  Call if worse

## 2019-09-03 NOTE — PROGRESS NOTES
Assessment/Plan:  Chief Complaint   Patient presents with    URI     achey, chills, fever; this started friday, pt thinks that she has had fevers off and on, no cough, pt states that head is achey     Patient Instructions   Start abx as directed and take thyroid med as directed, stay well hydrated and use Tylenol or advil prn fever or achiness  Call if worse  No problem-specific Assessment & Plan notes found for this encounter  Diagnoses and all orders for this visit:    Fever, unspecified fever cause    Chills    Cough  -     azithromycin (ZITHROMAX) 250 mg tablet; Take 2 tablets today then 1 tablet daily x 4 days    Tobacco abuse    Obesity (BMI 30-39  9)    Hypothyroidism, unspecified type    Gastroesophageal reflux disease, esophagitis presence not specified  -     omeprazole (PriLOSEC) 20 mg delayed release capsule; Take 1 capsule (20 mg total) by mouth daily before breakfast    Gastritis, presence of bleeding unspecified, unspecified chronicity, unspecified gastritis type  -     omeprazole (PriLOSEC) 20 mg delayed release capsule; Take 1 capsule (20 mg total) by mouth daily before breakfast    Epigastric pain  -     omeprazole (PriLOSEC) 20 mg delayed release capsule; Take 1 capsule (20 mg total) by mouth daily before breakfast          Subjective:      Patient ID: Ky Dennis is a 39 y o  female  URI (achey, chills, fever; this started friday, pt thinks that she has had fevers off and on, no cough, pt states that head is achey)  Pt  Is a smoker, achiness in right buttock area  Just started menstrual period last Friday  Hx of hypothyroidism  Had a colonoscopy and is told to come back in 5 years and has a hiatal hernia         The following portions of the patient's history were reviewed and updated as appropriate: allergies, current medications, past family history, past medical history, past social history, past surgical history and problem list     Review of Systems   Constitutional: Positive for chills and fever  HENT: Negative  Eyes: Negative  Respiratory: Positive for cough (mild)  Cardiovascular: Negative  Gastrointestinal: Negative  Endocrine: Negative  Genitourinary: Negative  Musculoskeletal:        Achey   Skin: Negative  Allergic/Immunologic: Negative  Neurological: Negative  Hematological: Negative  Psychiatric/Behavioral: Negative  Objective:      /74 (BP Location: Left arm, Patient Position: Sitting, Cuff Size: Standard)   Pulse 76   Temp 98 5 °F (36 9 °C)   Ht 5' 10" (1 778 m)   Wt 96 9 kg (213 lb 9 6 oz)   SpO2 97%   BMI 30 65 kg/m²          Physical Exam   Constitutional: She is oriented to person, place, and time  She appears well-developed and well-nourished  HENT:   Head: Normocephalic and atraumatic  Right Ear: External ear normal    Left Ear: External ear normal    Nose: Nose normal    Mouth/Throat: Oropharynx is clear and moist    Eyes: Pupils are equal, round, and reactive to light  Conjunctivae and EOM are normal    Neck: Normal range of motion  Neck supple  Cardiovascular: Normal rate, regular rhythm, normal heart sounds and intact distal pulses  Pulmonary/Chest: Effort normal and breath sounds normal    Mild cough   Musculoskeletal: Normal range of motion  Neurological: She is alert and oriented to person, place, and time  She has normal reflexes  Skin: Skin is warm and dry  Psychiatric: She has a normal mood and affect   Her behavior is normal

## 2019-09-05 ENCOUNTER — OFFICE VISIT (OUTPATIENT)
Dept: FAMILY MEDICINE CLINIC | Facility: CLINIC | Age: 44
End: 2019-09-05
Payer: COMMERCIAL

## 2019-09-05 ENCOUNTER — TELEPHONE (OUTPATIENT)
Dept: FAMILY MEDICINE CLINIC | Facility: CLINIC | Age: 44
End: 2019-09-05

## 2019-09-05 ENCOUNTER — HOSPITAL ENCOUNTER (OUTPATIENT)
Dept: ULTRASOUND IMAGING | Facility: HOSPITAL | Age: 44
Discharge: HOME | End: 2019-09-05
Payer: COMMERCIAL

## 2019-09-05 ENCOUNTER — APPOINTMENT (OUTPATIENT)
Dept: LAB | Facility: CLINIC | Age: 44
End: 2019-09-05
Payer: COMMERCIAL

## 2019-09-05 VITALS
WEIGHT: 213 LBS | HEIGHT: 70 IN | BODY MASS INDEX: 30.49 KG/M2 | SYSTOLIC BLOOD PRESSURE: 118 MMHG | OXYGEN SATURATION: 98 % | HEART RATE: 76 BPM | DIASTOLIC BLOOD PRESSURE: 72 MMHG | TEMPERATURE: 98.1 F

## 2019-09-05 DIAGNOSIS — E03.9 HYPOTHYROIDISM, UNSPECIFIED TYPE: ICD-10-CM

## 2019-09-05 DIAGNOSIS — R68.89 FLU-LIKE SYMPTOMS: ICD-10-CM

## 2019-09-05 DIAGNOSIS — R52 RADIATING PAIN: ICD-10-CM

## 2019-09-05 DIAGNOSIS — R23.2 HOT FLASHES: ICD-10-CM

## 2019-09-05 DIAGNOSIS — R93.2 ABNORMAL LIVER ULTRASOUND: Primary | ICD-10-CM

## 2019-09-05 DIAGNOSIS — R10.2 PELVIC PAIN: ICD-10-CM

## 2019-09-05 DIAGNOSIS — R10.2 PELVIC PAIN: Primary | ICD-10-CM

## 2019-09-05 LAB
ALBUMIN SERPL BCP-MCNC: 4 G/DL (ref 3.5–5)
ALP SERPL-CCNC: 99 U/L (ref 46–116)
ALT SERPL W P-5'-P-CCNC: 16 U/L (ref 12–78)
ANION GAP SERPL CALCULATED.3IONS-SCNC: 6 MMOL/L (ref 4–13)
AST SERPL W P-5'-P-CCNC: 11 U/L (ref 5–45)
BASOPHILS # BLD AUTO: 0.06 THOUSANDS/ΜL (ref 0–0.1)
BASOPHILS NFR BLD AUTO: 1 % (ref 0–1)
BILIRUB SERPL-MCNC: 0.39 MG/DL (ref 0.2–1)
BUN SERPL-MCNC: 7 MG/DL (ref 5–25)
CALCIUM SERPL-MCNC: 9.3 MG/DL (ref 8.3–10.1)
CHLORIDE SERPL-SCNC: 107 MMOL/L (ref 100–108)
CO2 SERPL-SCNC: 27 MMOL/L (ref 21–32)
CREAT SERPL-MCNC: 0.9 MG/DL (ref 0.6–1.3)
EOSINOPHIL # BLD AUTO: 0.15 THOUSAND/ΜL (ref 0–0.61)
EOSINOPHIL NFR BLD AUTO: 1 % (ref 0–6)
ERYTHROCYTE [DISTWIDTH] IN BLOOD BY AUTOMATED COUNT: 16.9 % (ref 11.6–15.1)
GFR SERPL CREATININE-BSD FRML MDRD: 78 ML/MIN/1.73SQ M
GLUCOSE SERPL-MCNC: 114 MG/DL (ref 65–140)
HCT VFR BLD AUTO: 38.8 % (ref 34.8–46.1)
HGB BLD-MCNC: 12 G/DL (ref 11.5–15.4)
IMM GRANULOCYTES # BLD AUTO: 0.04 THOUSAND/UL (ref 0–0.2)
IMM GRANULOCYTES NFR BLD AUTO: 0 % (ref 0–2)
LYMPHOCYTES # BLD AUTO: 2.8 THOUSANDS/ΜL (ref 0.6–4.47)
LYMPHOCYTES NFR BLD AUTO: 22 % (ref 14–44)
MCH RBC QN AUTO: 24.8 PG (ref 26.8–34.3)
MCHC RBC AUTO-ENTMCNC: 30.9 G/DL (ref 31.4–37.4)
MCV RBC AUTO: 80 FL (ref 82–98)
MONOCYTES # BLD AUTO: 0.6 THOUSAND/ΜL (ref 0.17–1.22)
MONOCYTES NFR BLD AUTO: 5 % (ref 4–12)
NEUTROPHILS # BLD AUTO: 9.09 THOUSANDS/ΜL (ref 1.85–7.62)
NEUTS SEG NFR BLD AUTO: 71 % (ref 43–75)
NRBC BLD AUTO-RTO: 0 /100 WBCS
PLATELET # BLD AUTO: 421 THOUSANDS/UL (ref 149–390)
PMV BLD AUTO: 11 FL (ref 8.9–12.7)
POTASSIUM SERPL-SCNC: 4 MMOL/L (ref 3.5–5.3)
PROT SERPL-MCNC: 7.5 G/DL (ref 6.4–8.2)
RBC # BLD AUTO: 4.84 MILLION/UL (ref 3.81–5.12)
SL AMB  POCT GLUCOSE, UA: NORMAL
SL AMB LEUKOCYTE ESTERASE,UA: NORMAL
SL AMB POCT BILIRUBIN,UA: NORMAL
SL AMB POCT BLOOD,UA: NORMAL
SL AMB POCT CLARITY,UA: CLEAR
SL AMB POCT COLOR,UA: NORMAL
SL AMB POCT KETONES,UA: NORMAL
SL AMB POCT NITRITE,UA: NORMAL
SL AMB POCT PH,UA: 5
SL AMB POCT SPECIFIC GRAVITY,UA: 1.02
SL AMB POCT URINE PROTEIN: NORMAL
SL AMB POCT UROBILINOGEN: NORMAL
SODIUM SERPL-SCNC: 140 MMOL/L (ref 136–145)
T4 FREE SERPL-MCNC: 0.68 NG/DL (ref 0.76–1.46)
TSH SERPL DL<=0.05 MIU/L-ACNC: 8.61 UIU/ML (ref 0.36–3.74)
WBC # BLD AUTO: 12.74 THOUSAND/UL (ref 4.31–10.16)

## 2019-09-05 PROCEDURE — 76700 US EXAM ABDOM COMPLETE: CPT

## 2019-09-05 PROCEDURE — 81002 URINALYSIS NONAUTO W/O SCOPE: CPT | Performed by: NURSE PRACTITIONER

## 2019-09-05 PROCEDURE — 99214 OFFICE O/P EST MOD 30 MIN: CPT | Performed by: NURSE PRACTITIONER

## 2019-09-05 PROCEDURE — 87086 URINE CULTURE/COLONY COUNT: CPT | Performed by: NURSE PRACTITIONER

## 2019-09-05 PROCEDURE — 76856 US EXAM PELVIC COMPLETE: CPT

## 2019-09-05 PROCEDURE — 36415 COLL VENOUS BLD VENIPUNCTURE: CPT

## 2019-09-05 PROCEDURE — 88112 CYTOPATH CELL ENHANCE TECH: CPT | Performed by: PATHOLOGY

## 2019-09-05 PROCEDURE — 80053 COMPREHEN METABOLIC PANEL: CPT

## 2019-09-05 PROCEDURE — 84439 ASSAY OF FREE THYROXINE: CPT

## 2019-09-05 PROCEDURE — 84443 ASSAY THYROID STIM HORMONE: CPT

## 2019-09-05 PROCEDURE — 76830 TRANSVAGINAL US NON-OB: CPT

## 2019-09-05 PROCEDURE — 85025 COMPLETE CBC W/AUTO DIFF WBC: CPT

## 2019-09-05 NOTE — TELEPHONE ENCOUNTER
I want an abdominal ultrasound and pelvic ultrasound with transvag   I have it linked to the pelvic pain AND radiating pain which is why I want the abdominal US completed as well

## 2019-09-05 NOTE — PATIENT INSTRUCTIONS
Complete ultrasound  Complete lab work- this is non fasting  Continue to monitor symptoms  Please call the office if you are experiencing any worsening of symptoms or no symptom improvement  Pelvic Pain   WHAT YOU NEED TO KNOW:   Pelvic pain may be caused by a number of conditions, such as irritable bowel syndrome, appendicitis, or constipation  A urinary tract infection, prostate inflammation, menstrual cramps, or kidney stones can also cause pelvic pain  You may have pain on one or both sides of your pelvis  Pelvic pain can develop if you have trauma to your pelvis or if you sit or stand for a long time  DISCHARGE INSTRUCTIONS:   Medicines: You may need any of the following:  · NSAIDs , such as ibuprofen, help decrease swelling, pain, and fever  This medicine is available with or without a doctor's order  NSAIDs can cause stomach bleeding or kidney problems in certain people  If you take blood thinner medicine, always ask your healthcare provider if NSAIDs are safe for you  Always read the medicine label and follow directions  · Prescription pain medicine  may be given  Ask how to take this medicine safely  · Birth control medicines  may help decrease pain in women  · Take your medicine as directed  Contact your healthcare provider if you think your medicine is not helping or if you have side effects  Tell him or her if you are allergic to any medicine  Keep a list of the medicines, vitamins, and herbs you take  Include the amounts, and when and why you take them  Bring the list or the pill bottles to follow-up visits  Carry your medicine list with you in case of an emergency  Call 911 for any of the following:   · You have severe chest pain and sudden trouble breathing  Contact your healthcare provider if:   · You have a fever  · You have nausea, vomiting, or diarrhea  · Your pain does not go away, or it gets worse, even after treatment      · You have numbness in your legs or toes     · You have heavy or unusual vaginal bleeding  · You have questions or concerns about your condition or care  Manage your pelvic pain:   · Keep a pain record  Write down when your pain happens and how severe it is  Include any other symptoms you have with your pain  A record will help you keep track of pain cycles  Bring the record with you to your follow-up visits  It may also help your healthcare provider find out what is causing your pain  · Learn ways to relax  Deep breathing, meditation, and relaxation techniques can help decrease your pain  When you are tense, your pain may increase  · Treat or prevent constipation  Drink liquids as directed  You may need to drink more liquid than usual  Ask your healthcare provider how much liquid to drink each day  Eat high-fiber foods  High-fiber foods include fruit, vegetables, whole-grain breads and cereals, and beans  You may need to change the foods you eat if you have irritable bowel syndrome  Follow up with your healthcare provider as directed: You may need physical therapy  You may need to see an orthopedic specialist  Write down your questions so you remember to ask them during your visits  © 2017 [address]  Information is for End User's use only and may not be sold, redistributed or otherwise used for commercial purposes  All illustrations and images included in CareNotes® are the copyrighted property of [employer]  or Gaston Shearer  The above information is an  only  It is not intended as medical advice for individual conditions or treatments  Talk to your doctor, nurse or pharmacist before following any medical regimen to see if it is safe and effective for you

## 2019-09-05 NOTE — PROGRESS NOTES
Assessment/Plan:    Pelvic Pain/ Flu like Symptoms/ hot Flashes/ radiating pelvic pain   No signs of acute abdomen on exam  UA wnl, will do culture and cytology as urine was dark and malodorous  No signs of hernia on exam  Complete antibiotic course at this time  Will get stat pelvic and abdominal US to rule out possible causes  Will get lab work done  Has hx of leukocytosis  Continue to monitor symptoms  Please call the office if you are experiencing any worsening of symptoms or no symptom improvement  Handout provided  Will follow up with results  Advised to call if she notices any rash/skin lesions in painful area  Diagnoses and all orders for this visit:    Pelvic pain  -     US abdomen and pelvis with transvaginal; Future  -     CBC and differential; Future  -     Comprehensive metabolic panel; Future  -     POCT urine dip  -     Urine culture; Future  -     Cytology, urine; Future  -     Urine culture  -     Cytology, urine    Flu-like symptoms  -     CBC and differential; Future  -     Comprehensive metabolic panel; Future    Hot flashes  -     CBC and differential; Future  -     Comprehensive metabolic panel; Future    Radiating abdominal pain  -     US abdomen and pelvis with transvaginal; Future  -     CBC and differential; Future  -     Comprehensive metabolic panel; Future  -     POCT urine dip  -     Urine culture; Future  -     Cytology, urine; Future  -     Urine culture  -     Cytology, urine      Patient verbalizes understand and agrees with treatment plan  Subjective:        Patient ID: Marielle Zapata is a 39 y o  female  Chief Complaint   Patient presents with    Fatigue     with chills, slight cough and pain in pelvic area; pt was here 09/03/2019  symptoms started last Friday  taking Zpak no f/n/v/d indicated       Patient presents with:  Fatigue: with chills, slight cough and pain in pelvic area; pt was here 09/03/2019  symptoms started last Friday   taking Zpak no f/n/v/d indicated    LMP 30th, lasting until 9/4- pelvic pain started with menses, right pelvic pain and right buttock pain  No rash or erythema  Sensitive to touch  Worse with seated position, does not notice worsened pain with movement  No chance of pregnancy/ not sexually active/ no concern for STDs per patient  No hx ovarian cyst  She is getting hot/flushing feeling a few times a day  Due for GYN  No diarrhea or constipation  No vomitting  No blood in stool  No vaginal discharge  No urinary complaints  The following portions of the patient's history were reviewed and updated as appropriate: allergies, current medications, past family history, past social history and problem list     Review of Systems   Constitutional: Positive for chills and fatigue  Negative for fever  HENT: Negative for congestion  Respiratory: Positive for cough (dry- chronic from smoking)  Gastrointestinal: Negative for abdominal distention, abdominal pain, blood in stool, constipation, nausea, rectal pain and vomiting  Genitourinary: Positive for pelvic pain  Negative for decreased urine volume, dysuria, frequency, hematuria, urgency and vaginal discharge  Objective:  /72 (BP Location: Left arm, Patient Position: Sitting, Cuff Size: Standard)   Pulse 76   Temp 98 1 °F (36 7 °C) (Temporal)   Ht 5' 10" (1 778 m)   Wt 96 6 kg (213 lb)   SpO2 98%   BMI 30 56 kg/m²      Physical Exam   Constitutional: She is oriented to person, place, and time  She appears well-developed  No distress  obese   HENT:   Head: Normocephalic and atraumatic  Right Ear: Hearing, tympanic membrane, external ear and ear canal normal  Tympanic membrane is not erythematous, not retracted and not bulging  No middle ear effusion  Left Ear: Hearing, tympanic membrane, external ear and ear canal normal  Tympanic membrane is not erythematous, not retracted and not bulging  No middle ear effusion     Nose: Nose normal    Mouth/Throat: Uvula is midline and oropharynx is clear and moist  No oropharyngeal exudate, posterior oropharyngeal edema or posterior oropharyngeal erythema  Eyes: Conjunctivae and lids are normal  Right eye exhibits no discharge  Left eye exhibits no discharge  Neck: Normal range of motion  Neck supple  No tracheal deviation present  Cardiovascular: Normal rate and regular rhythm  No murmur heard  Pulmonary/Chest: Effort normal and breath sounds normal  No respiratory distress  She has no wheezes  Abdominal: Soft  Bowel sounds are normal  She exhibits no distension  There is tenderness (right pelvis)  There is no rigidity, no rebound, no guarding, no CVA tenderness, no tenderness at McBurney's point and negative Smalls's sign  Musculoskeletal: Normal range of motion  She exhibits no edema or deformity  Lymphadenopathy:     She has no cervical adenopathy  Neurological: She is alert and oriented to person, place, and time  Coordination normal    Skin: Skin is warm and dry  No rash noted  She is not diaphoretic  No erythema  Psychiatric: She has a normal mood and affect  Her speech is normal and behavior is normal  Judgment and thought content normal  Cognition and memory are normal    Nursing note and vitals reviewed

## 2019-09-05 NOTE — TELEPHONE ENCOUNTER
I called and spoke to [facility] she was given the message as Tom Kothari documented below

## 2019-09-06 LAB — BACTERIA UR CULT: NORMAL

## 2019-09-10 DIAGNOSIS — E03.9 HYPOTHYROIDISM, UNSPECIFIED TYPE: Primary | ICD-10-CM

## 2019-09-10 RX ORDER — LEVOTHYROXINE SODIUM 0.05 MG/1
50 TABLET ORAL DAILY
Qty: 90 TABLET | Refills: 1 | Status: SHIPPED | OUTPATIENT
Start: 2019-09-10 | End: 2020-06-03

## 2019-09-13 ENCOUNTER — ANNUAL EXAM (OUTPATIENT)
Dept: OBGYN CLINIC | Facility: MEDICAL CENTER | Age: 44
End: 2019-09-13
Payer: COMMERCIAL

## 2019-09-13 VITALS
HEIGHT: 70 IN | DIASTOLIC BLOOD PRESSURE: 72 MMHG | WEIGHT: 215 LBS | SYSTOLIC BLOOD PRESSURE: 122 MMHG | BODY MASS INDEX: 30.78 KG/M2

## 2019-09-13 DIAGNOSIS — R30.0 DYSURIA: Primary | ICD-10-CM

## 2019-09-13 DIAGNOSIS — R10.2 PELVIC PAIN: ICD-10-CM

## 2019-09-13 LAB
SL AMB  POCT GLUCOSE, UA: NEGATIVE
SL AMB LEUKOCYTE ESTERASE,UA: ABNORMAL
SL AMB POCT BILIRUBIN,UA: NEGATIVE
SL AMB POCT BLOOD,UA: NEGATIVE
SL AMB POCT CLARITY,UA: ABNORMAL
SL AMB POCT COLOR,UA: YELLOW
SL AMB POCT KETONES,UA: NEGATIVE
SL AMB POCT NITRITE,UA: NEGATIVE
SL AMB POCT PH,UA: 6
SL AMB POCT SPECIFIC GRAVITY,UA: 1.02
SL AMB POCT URINE PROTEIN: ABNORMAL
SL AMB POCT UROBILINOGEN: NEGATIVE

## 2019-09-13 PROCEDURE — 99214 OFFICE O/P EST MOD 30 MIN: CPT | Performed by: OBSTETRICS & GYNECOLOGY

## 2019-09-13 PROCEDURE — 81002 URINALYSIS NONAUTO W/O SCOPE: CPT | Performed by: OBSTETRICS & GYNECOLOGY

## 2019-09-13 NOTE — PROGRESS NOTES
Assessment Luke Falk was seen today for pelvic pain  Diagnoses and all orders for this visit:    Dysuria  -     POCT urine dip  -     Urine culture  -     UA (URINE) with reflex to Microscopic    Pelvic pain    Discussion/Summary:  Patient here due to described symptoms below; she first noticed them from mid cycle to her menses  Nurse practitioner in Dr Dontrell Mckeon office ordered a pelvic u s  I reviewed the results with her and had shown her the small fibroid and location and due to size, did not believe the cause of her discomfort; u s  Revealed a small trace of fluid indicating a physiologic rupture of a follicle, which usually resolves spontaneously in 2-3 days, accompanied by heat to the area and an OTC antiinflammatory, again , not a major cause of her discomfort; a mid stream clean catch urine specimen was provided and appeared cloudy; was sent for urinalysis; she related that she had a colonoscopy in July and possibly a bacterial contaminant from the procedure could have started a UTI; for now , advised her to decrease caffeine, increase water; drink cranberry juice or take daily cranberry tablets; also, avoid carbonated beverages; she also mentioned she had felt burning and itching recently over her entire body, then with questioning, admitted to using a new detergent; she had taken Benadryl for the relief  Exam and discussio0 was at least 25 minutes with greater than 50 % counseling in the presence of freddy Mishra, Dennis Lucas  Plan;will heed urinalysis and notify patient and treat accordingly  Estevan   Marielle Zapata is a 39 y o  female here for a problem visit  Patient is complaining of dysuria, backache, thigh muscle pain,primarily at night     Sx's started 2 weeks ago  Patient reports that sx's are related to her menses        Patient Active Problem List   Diagnosis    Cervical radiculopathy    Chronic lymphocytic thyroiditis    Hypothyroidism    Lumbar radiculopathy    Obesity    Sciatica    Leukemoid reaction    Other chest pain       Gynecologic History  Patient's last menstrual period was 08/30/2019  The current method of family planning is none      Past Medical History:   Diagnosis Date    Abnormal Pap smear of cervix     with ASUS favoring benign, last assessed 1/9/14    Tinnitus     hypothyroid     Past Surgical History:   Procedure Laterality Date    TONSILLECTOMY AND ADENOIDECTOMY      TOTAL THYROIDECTOMY N/A 06/21/2014    Dr Richard Reddy     Family History   Problem Relation Age of Onset    Diabetes Son     Diabetes type II Mother     Hypertension Father         benign essential     Social History     Socioeconomic History    Marital status: /Civil Union     Spouse name: Not on file    Number of children: Not on file    Years of education: Not on file    Highest education level: Not on file   Occupational History    Not on file   Social Needs    Financial resource strain: Not on file    Food insecurity:     Worry: Not on file     Inability: Not on file    Transportation needs:     Medical: Not on file     Non-medical: Not on file   Tobacco Use    Smoking status: Current Every Day Smoker    Smokeless tobacco: Never Used   Substance and Sexual Activity    Alcohol use: Yes     Comment: occassional  / social per [employer]    Drug use: No    Sexual activity: Yes   Lifestyle    Physical activity:     Days per week: Not on file     Minutes per session: Not on file    Stress: Not on file   Relationships    Social connections:     Talks on phone: Not on file     Gets together: Not on file     Attends Shinto service: Not on file     Active member of club or organization: Not on file     Attends meetings of clubs or organizations: Not on file     Relationship status: Not on file    Intimate partner violence:     Fear of current or ex partner: Not on file     Emotionally abused: Not on file     Physically abused: Not on file     Forced sexual activity: Not on file   Other Topics Concern    Not on file   Social History Narrative    Not on file     Allergies   Allergen Reactions    Lamisil [Terbinafine]     Prednisone      Rapid heart beat and palpitations        Current Outpatient Medications:     levothyroxine 200 mcg tablet, TAKE 1 TABLET (200 MCG TOTAL) BY MOUTH DAILY, Disp: 30 tablet, Rfl: 2    levothyroxine 50 mcg tablet, Take 1 tablet (50 mcg total) by mouth daily, Disp: 90 tablet, Rfl: 1    omeprazole (PriLOSEC) 20 mg delayed release capsule, Take 1 capsule (20 mg total) by mouth daily before breakfast, Disp: 30 capsule, Rfl: 5    Review of Systems  Constitutional :no fever, feels well, no tiredness, no recent weight gain or loss  ENT: no ear ache, no loss of hearing, no nosebleeds or nasal discharge, no sore throat or hoarseness  Cardiovascular: no complaints of slow or fast heart beat, no chest pain, no palpitations, no leg claudication or lower extremity edema  Respiratory: no complaints of shortness of shortness of breath, no WOMACK  Breasts:no complaints of breast pain, breast lump, or nipple discharge  Gastrointestinal: no complaints of abdominal pain, constipation, nausea, vomiting, or diarrhea or bloody stools  Genitourinary : no complaints of dysuria, incontinence, pelvic pain, no dysmenorrhea, vaginal discharge or abnormal vaginal bleeding and as noted in HPI  Musculoskeletal: no complaints of arthralgia, no myalgia, no joint swelling or stiffness, no limb pain or swelling  Integumentary: no complaints of skin rash or lesion, itching or dry skin  Neurological: no complaints of headache, no confusion, no numbness or tingling, no dizziness or fainting     Objective     /72   Ht 5' 9 5" (1 765 m)   Wt 97 5 kg (215 lb)   LMP 08/30/2019   Breastfeeding? No   BMI 31 29 kg/m²     General Appears stated age, cooperative, alert normal mood and affect   Psychiatric oriented to person, place and time    Mood and affect normal Neck: normal, supple,trachea midline, no masses  Thyroid: normal, no thyromegaly   Heart: regular rate and rhythm, S1, S2 normal, no murmur, click, rub or gallop   Lungs: clear to auscultation bilaterally, no increased work of breathing or signs of respiratory distress   Breasts: normal, no dimpling or skin changes noted   Abdomen: soft, non-tender, without masses or organomegaly   Vulva: normal , no lesions   Vagina: normal , no lesions or dryness   Urethra: normal   Urethal meatus normal   Bladder Normal, soft, non-tender and no prolapse or masses appreciated   Cervix: normal, no palpable masses    Uterus: normal , non-tender, not enlarged, no palpable masses   Adnexa: normal, non-tender without fullness or masses   Lymphatic Palpation of lymph nodes in neck, axilla, groin and/or other locations: no lymphadenopathy or masses noted   Skin Normal skin turgor and no rashes    Palpation of skin and subcutaneous tissue normal

## 2019-09-15 LAB
APPEARANCE UR: ABNORMAL
BACTERIA UR QL AUTO: ABNORMAL /HPF
BILIRUB UR QL STRIP: NEGATIVE
COLOR UR: YELLOW
GLUCOSE UR QL STRIP: NEGATIVE
HGB UR QL STRIP: NEGATIVE
HYALINE CASTS #/AREA URNS LPF: ABNORMAL /LPF
KETONES UR QL STRIP: NEGATIVE
LEUKOCYTE ESTERASE UR QL STRIP: ABNORMAL
NITRITE UR QL STRIP: NEGATIVE
PH UR STRIP: 6 [PH] (ref 5–8)
PROT UR QL STRIP: NEGATIVE
RBC #/AREA URNS HPF: ABNORMAL /HPF
SP GR UR STRIP: 1.02 (ref 1–1.03)
SQUAMOUS #/AREA URNS HPF: ABNORMAL /HPF
WBC #/AREA URNS HPF: ABNORMAL /HPF

## 2019-09-16 ENCOUNTER — TELEPHONE (OUTPATIENT)
Dept: OBGYN CLINIC | Facility: MEDICAL CENTER | Age: 44
End: 2019-09-16

## 2019-09-16 DIAGNOSIS — D25.2 SUBSEROUS LEIOMYOMA OF UTERUS: Primary | ICD-10-CM

## 2019-09-16 RX ORDER — IBUPROFEN 600 MG/1
600 TABLET ORAL EVERY 6 HOURS SCHEDULED
Qty: 30 TABLET | Refills: 2 | Status: SHIPPED | OUTPATIENT
Start: 2019-09-16 | End: 2019-09-24

## 2019-09-16 NOTE — TELEPHONE ENCOUNTER
Call received from patient regarding results  Patient seen by Dr Shante Mclaughlin for new onset of flushing, low-grade fevers and subjective chills, and pelvic pain  Patient notes the flushing is most concerning to her  Whole body and described as a "wind burn" sensation  Unrelieved with showers  Tries tylenol and advil intermittently without relief  On questioning, notes that her last thyroid studies were abnormal due to starting omeprazole and that she recently had medication dose adjusted  Reviewed with patient her testing  UCx is notable for low-level colony counts of urogenital carroll  Reviewed this either represents colonization or contamination and that I do not recommend treatment of this finding  Reviewed recent pelvic ultrasound, which is notable for small fibroid  Review of imaging reveals central darkening of the fibroid  This may represent degeneration of the fibroid, which can produce low grade fevers, discomfort, and inflammation  Her whole-body flushing would be an extreme response, but possibly related  Recommend full evaluation by PCP for non-GYN etiology of this symptom  Recommend 2-3d of scheduled NSAIDs; if no relief at all, I insist on eval for non-GYN etiology  If partial or full relief, possibly related to this  Discussed treatment is supportive care until full degeneration vs surgical management  In most cases, surgical management can be avoided  Patient questions answered and she is in agreement with the proposed plan

## 2019-09-18 ENCOUNTER — OFFICE VISIT (OUTPATIENT)
Dept: FAMILY MEDICINE CLINIC | Facility: CLINIC | Age: 44
End: 2019-09-18
Payer: COMMERCIAL

## 2019-09-18 VITALS
HEIGHT: 70 IN | HEART RATE: 75 BPM | OXYGEN SATURATION: 98 % | DIASTOLIC BLOOD PRESSURE: 60 MMHG | SYSTOLIC BLOOD PRESSURE: 120 MMHG | BODY MASS INDEX: 30.92 KG/M2 | WEIGHT: 216 LBS | TEMPERATURE: 98.9 F

## 2019-09-18 DIAGNOSIS — G47.20 DISRUPTIONS OF 24 HOUR SLEEP-WAKE CYCLE: ICD-10-CM

## 2019-09-18 DIAGNOSIS — K21.9 GASTROESOPHAGEAL REFLUX DISEASE, ESOPHAGITIS PRESENCE NOT SPECIFIED: ICD-10-CM

## 2019-09-18 DIAGNOSIS — R20.8 BURNING SENSATION OF SKIN: Primary | ICD-10-CM

## 2019-09-18 DIAGNOSIS — E89.0 POSTOPERATIVE HYPOTHYROIDISM: ICD-10-CM

## 2019-09-18 PROCEDURE — 99214 OFFICE O/P EST MOD 30 MIN: CPT | Performed by: FAMILY MEDICINE

## 2019-09-20 ENCOUNTER — TELEPHONE (OUTPATIENT)
Dept: FAMILY MEDICINE CLINIC | Facility: CLINIC | Age: 44
End: 2019-09-20

## 2019-09-20 DIAGNOSIS — J02.9 SORE THROAT: Primary | ICD-10-CM

## 2019-09-20 RX ORDER — AMOXICILLIN 875 MG/1
875 TABLET, COATED ORAL 2 TIMES DAILY
Qty: 14 TABLET | Refills: 0 | Status: SHIPPED | OUTPATIENT
Start: 2019-09-20 | End: 2019-09-24

## 2019-09-20 NOTE — TELEPHONE ENCOUNTER
Ml Starkey called the office requesting if you can please send a script to the [facility] on [facility] in [place] [facility] for her for a sore throat and pt can see the white spots in the back of her throat   Pt's number is [phone]

## 2019-09-24 ENCOUNTER — TELEPHONE (OUTPATIENT)
Dept: FAMILY MEDICINE CLINIC | Facility: CLINIC | Age: 44
End: 2019-09-24

## 2019-09-24 ENCOUNTER — OFFICE VISIT (OUTPATIENT)
Dept: FAMILY MEDICINE CLINIC | Facility: CLINIC | Age: 44
End: 2019-09-24
Payer: COMMERCIAL

## 2019-09-24 VITALS
BODY MASS INDEX: 30.49 KG/M2 | SYSTOLIC BLOOD PRESSURE: 122 MMHG | HEART RATE: 90 BPM | OXYGEN SATURATION: 98 % | WEIGHT: 213 LBS | HEIGHT: 70 IN | DIASTOLIC BLOOD PRESSURE: 82 MMHG | TEMPERATURE: 98.4 F

## 2019-09-24 DIAGNOSIS — F41.9 ANXIETY: ICD-10-CM

## 2019-09-24 DIAGNOSIS — R20.8 BURNING SENSATION: Primary | ICD-10-CM

## 2019-09-24 DIAGNOSIS — K21.9 GASTROESOPHAGEAL REFLUX DISEASE, ESOPHAGITIS PRESENCE NOT SPECIFIED: ICD-10-CM

## 2019-09-24 DIAGNOSIS — E89.0 POSTOPERATIVE HYPOTHYROIDISM: ICD-10-CM

## 2019-09-24 PROCEDURE — 99214 OFFICE O/P EST MOD 30 MIN: CPT | Performed by: FAMILY MEDICINE

## 2019-09-24 RX ORDER — ALPRAZOLAM 0.25 MG/1
0.25 TABLET ORAL 2 TIMES DAILY PRN
Qty: 20 TABLET | Refills: 0 | Status: SHIPPED | OUTPATIENT
Start: 2019-09-24 | End: 2019-10-31

## 2019-09-24 RX ORDER — PREDNISONE 1 MG/1
5 TABLET ORAL DAILY
Qty: 30 TABLET | Refills: 1 | Status: SHIPPED | OUTPATIENT
Start: 2019-09-24 | End: 2019-10-15 | Stop reason: ALTCHOICE

## 2019-09-24 NOTE — TELEPHONE ENCOUNTER
Pt has had burning all over x 3 weeks with no rash pt needs to see Dr Amezqutia within the next 1-2 weeks  Pt has an appointment scheduled for Friday 9/27 at 11:30 am with Dr Amezquitae they would like pt's lab results sent to [phone]     At time of scheduling were given pt's insurance   ID and group number

## 2019-09-25 ENCOUNTER — APPOINTMENT (OUTPATIENT)
Dept: LAB | Facility: CLINIC | Age: 44
End: 2019-09-25
Payer: COMMERCIAL

## 2019-09-25 DIAGNOSIS — R93.2 ABNORMAL LIVER ULTRASOUND: ICD-10-CM

## 2019-09-25 DIAGNOSIS — R20.8 BURNING SENSATION: Primary | ICD-10-CM

## 2019-09-25 DIAGNOSIS — E03.9 HYPOTHYROIDISM, UNSPECIFIED TYPE: ICD-10-CM

## 2019-09-25 DIAGNOSIS — R20.8 BURNING SENSATION OF SKIN: ICD-10-CM

## 2019-09-25 LAB
CRP SERPL QL: <3 MG/L
ERYTHROCYTE [SEDIMENTATION RATE] IN BLOOD: 16 MM/HOUR (ref 0–20)
HAV IGM SER QL: NORMAL
HBV CORE AB SER QL: NORMAL
HBV CORE IGM SER QL: NORMAL
HBV CORE IGM SER QL: NORMAL
HBV SURFACE AG SER QL: NORMAL
HBV SURFACE AG SER QL: NORMAL
HCV AB SER QL: NORMAL
HCV AB SER QL: NORMAL
T4 FREE SERPL-MCNC: 1.23 NG/DL (ref 0.76–1.46)
TSH SERPL DL<=0.05 MIU/L-ACNC: 9.75 UIU/ML (ref 0.36–3.74)

## 2019-09-25 PROCEDURE — 84443 ASSAY THYROID STIM HORMONE: CPT

## 2019-09-25 PROCEDURE — 80074 ACUTE HEPATITIS PANEL: CPT

## 2019-09-25 PROCEDURE — 86038 ANTINUCLEAR ANTIBODIES: CPT

## 2019-09-25 PROCEDURE — 86704 HEP B CORE ANTIBODY TOTAL: CPT

## 2019-09-25 PROCEDURE — 36415 COLL VENOUS BLD VENIPUNCTURE: CPT

## 2019-09-25 PROCEDURE — 86140 C-REACTIVE PROTEIN: CPT

## 2019-09-25 PROCEDURE — 85652 RBC SED RATE AUTOMATED: CPT

## 2019-09-25 PROCEDURE — 86235 NUCLEAR ANTIGEN ANTIBODY: CPT

## 2019-09-25 PROCEDURE — 84439 ASSAY OF FREE THYROXINE: CPT

## 2019-09-25 NOTE — PROGRESS NOTES
Assessment/Plan:  Patient is here for the 2nd visit  She try topical measures without any relief  Despite being very hesitant on taking prednisone I assured her instead of giving her standard taper we can start her on 5 mg just daily to see if it makes a difference while we try to get her in to Dermatology  Prescription for Xanax given in case anxiety is a component  Recommend update labs today even though she just adjusted the way she does her thyroid medication  Multiple etiologies including the possible rare event of MS reviewed  Recommend today Sjogren's antibodies sed rate C-reactive protein, TERESA, repeat T4 and TSH  ER if worse  Hopefully patient will tolerate prednisone  Diagnoses and all orders for this visit:    Burning sensation  -     predniSONE 5 mg tablet; Take 1 tablet (5 mg total) by mouth daily    Anxiety  -     ALPRAZolam (XANAX) 0 25 mg tablet; Take 1 tablet (0 25 mg total) by mouth 2 (two) times a day as needed for anxiety    Postoperative hypothyroidism    Gastroesophageal reflux disease, esophagitis presence not specified        1  Burning sensation  predniSONE 5 mg tablet   2  Anxiety  ALPRAZolam (XANAX) 0 25 mg tablet   3  Postoperative hypothyroidism     4  Gastroesophageal reflux disease, esophagitis presence not specified         Subjective:        Patient ID: Grisel Woo is a 39 y o  female  Chief Complaint   Patient presents with    Follow-up     still having burning feeling; pt states that the feeling has not gone away at all       Patient is here for 2nd visit within 2 weeks with regards to entire body burning skin sensation  She has had no rash  She was originally taking her new stomach medication from another doctor with her levothyroxine and her TSH went up to 8+  Her TSH has been elevated in the past but she did not have this burning sensation    She previously had tried different detergent but has gone back to her normal product and still has not had any improvement  Nobody else at home has any of the same sensations  She has had no recent travel  Tylenol or Advil does not work  The following portions of the patient's history were reviewed and updated as appropriate: past medical history, past surgical history and problem list       Review of Systems   Constitutional: Negative for appetite change, fatigue, fever and unexpected weight change  HENT: Negative for congestion, ear pain, postnasal drip, rhinorrhea, sinus pressure, sinus pain and sore throat  Eyes: Negative for redness and visual disturbance  Respiratory: Negative for chest tightness and shortness of breath  Cardiovascular: Negative for chest pain, palpitations and leg swelling  Gastrointestinal: Negative for abdominal distention, abdominal pain, diarrhea and nausea  Endocrine: Negative for cold intolerance and heat intolerance  Genitourinary: Negative for dysuria and hematuria  Musculoskeletal: Negative for arthralgias, gait problem and myalgias  Skin: Negative for pallor and rash  Burning sensation across skin of whole body  No rash   Neurological: Negative for dizziness and headaches  Psychiatric/Behavioral: Negative for behavioral problems  The patient is not nervous/anxious  Objective:  /82 (BP Location: Left arm, Patient Position: Sitting, Cuff Size: Standard)   Pulse 90   Temp 98 4 °F (36 9 °C)   Ht 5' 10" (1 778 m)   Wt 96 6 kg (213 lb)   LMP 08/30/2019   SpO2 98%   BMI 30 56 kg/m²        Physical Exam   Constitutional: She is oriented to person, place, and time  She appears well-nourished  No distress  HENT:   Head: Normocephalic and atraumatic  Right Ear: Tympanic membrane normal    Left Ear: Tympanic membrane normal    Mouth/Throat: Oropharynx is clear and moist    Eyes: Pupils are equal, round, and reactive to light  Conjunctivae and EOM are normal  No scleral icterus  Neck: Normal range of motion  Neck supple   No thyromegaly present  Cardiovascular: Normal rate, regular rhythm, normal heart sounds and intact distal pulses  No murmur heard  Pulses:       Carotid pulses are 0 on the right side, and 0 on the left side  Pulmonary/Chest: Effort normal and breath sounds normal  No respiratory distress  She has no wheezes  Abdominal: Soft  She exhibits no distension  Musculoskeletal: She exhibits no edema  Lymphadenopathy:     She has no cervical adenopathy  Neurological: She is alert and oriented to person, place, and time  She has normal reflexes  No cranial nerve deficit  Skin: Skin is warm  No rash noted  No erythema  Psychiatric: She has a normal mood and affect  Her behavior is normal  Judgment and thought content normal    Nursing note and vitals reviewed

## 2019-09-26 ENCOUNTER — TELEPHONE (OUTPATIENT)
Dept: FAMILY MEDICINE CLINIC | Facility: CLINIC | Age: 44
End: 2019-09-26

## 2019-09-26 DIAGNOSIS — E89.0 POSTOPERATIVE HYPOTHYROIDISM: Primary | ICD-10-CM

## 2019-09-26 PROBLEM — R07.89 OTHER CHEST PAIN: Status: RESOLVED | Noted: 2018-08-09 | Resolved: 2019-09-26

## 2019-09-26 LAB
ENA SS-A AB SER-ACNC: <0.2 AI (ref 0–0.9)
ENA SS-B AB SER-ACNC: <0.2 AI (ref 0–0.9)

## 2019-09-26 NOTE — TELEPHONE ENCOUNTER
Dr Tamica Grimes wanted her to have it retested at this time due to her symptoms she was having  (full body burning and aching)  Pt is aware Dr Tamica Grimes is not here until Monday  Please advise

## 2019-09-26 NOTE — TELEPHONE ENCOUNTER
Is she currently taking the 50mcg daily? If so I would ask her to do an extra 1/2 tablet on Sunday  Recheck in 4-6 weeks along with thyroid antibody panel and TSH with T4 reflex     Is she taking it on an empty stomach 1 hour before other medications/food?

## 2019-09-26 NOTE — TELEPHONE ENCOUNTER
Yes she is taking 50mcg  She will now be taking 75mcg Sunday  Yes, she is taking on empty stomach  Labs sent to home

## 2019-09-26 NOTE — PROGRESS NOTES
Assessment/Plan:     multiple etiologies reviewed with regards to burning of the skin  Autoimmune profile  Ordered  This could all be possibly be related to new proton pump inhibitor and the dosing of that with the levothyroxine which has made the TSH go completely out of range  I am not convinced hypothyroidism is the cause of this since she states her numbers have been elevated in the past without this skin sensation  Recommend hold dosing of the proton pump inhibitor for at least 45 minutes to 1 hour after levothyroxine  If not improving  Proton pump inhibitor and go to Zantac or Pepcid  Declines prednisone  Can take Advil  Topical agents such as Aveeno oatmeal bath reviewed  If not improving may need Dermatology eval   Patient wishes to continue only with me as primary     Diagnoses and all orders for this visit:    Burning sensation of skin  -     TERESA Screen w/ Reflex to Titer/Pattern; Future  -     C-reactive protein; Future  -     Sedimentation rate, automated; Future  -     Sjogren's Antibodies; Future    Postoperative hypothyroidism  -     TSH, 3rd generation; Future  -     T4, free; Future    Gastroesophageal reflux disease, esophagitis presence not specified    Disruptions of 24 hour sleep-wake cycle        1  Burning sensation of skin  TERESA Screen w/ Reflex to Titer/Pattern    C-reactive protein    Sedimentation rate, automated    Sjogren's Antibodies   2  Postoperative hypothyroidism  TSH, 3rd generation    T4, free   3  Gastroesophageal reflux disease, esophagitis presence not specified     4  Disruptions of 24 hour sleep-wake cycle         Subjective:        Patient ID: Sherley Hay is a 39 y o  female  Chief Complaint   Patient presents with    Skin Irritation     skin burning 2 1/2 weeks; face feels like she has wind burn, rest of body burning sensation, and elevates at night  no redness        Patient here for burning sensation which is new    States entire skin feels like it is burning  Having difficulty sleeping  Notes that she was placed on proton pump inhibitor recently through another provider in our office  Was not told to not take the proton pump inhibitor with the levothyroxine  Upset  No recent travel  States she might have changed detergent rib briefly but has gone back to everything that she normally does in the burning sensation has not improved  The following portions of the patient's history were reviewed and updated as appropriate: past medical history, past surgical history and problem list       Review of Systems   Constitutional: Negative for appetite change, fatigue, fever and unexpected weight change  HENT: Negative for congestion, ear pain, postnasal drip, rhinorrhea, sinus pressure, sinus pain and sore throat  Eyes: Negative for redness and visual disturbance  Respiratory: Negative for chest tightness and shortness of breath  Cardiovascular: Negative for chest pain, palpitations and leg swelling  Gastrointestinal: Negative for abdominal distention, abdominal pain, diarrhea and nausea  Endocrine: Negative for cold intolerance and heat intolerance  Genitourinary: Negative for dysuria and hematuria  Musculoskeletal: Negative for arthralgias, gait problem and myalgias  Skin: Negative for pallor and rash  Burning sensation of skin without rash   Neurological: Negative for dizziness and headaches  Psychiatric/Behavioral: Negative for behavioral problems  The patient is nervous/anxious  Objective:  /60 (BP Location: Left arm, Patient Position: Sitting, Cuff Size: Standard)   Pulse 75   Temp 98 9 °F (37 2 °C)   Ht 5' 10" (1 778 m)   Wt 98 kg (216 lb)   LMP 08/30/2019   SpO2 98%   BMI 30 99 kg/m²        Physical Exam   Constitutional: She is oriented to person, place, and time  She appears well-nourished  No distress  HENT:   Head: Normocephalic and atraumatic     Right Ear: Tympanic membrane normal    Left Ear: Tympanic membrane normal    Mouth/Throat: Oropharynx is clear and moist    Eyes: Pupils are equal, round, and reactive to light  Conjunctivae and EOM are normal  No scleral icterus  Neck: Normal range of motion  Neck supple  Cardiovascular: Normal rate, regular rhythm, normal heart sounds and intact distal pulses  No murmur heard  Pulses:       Carotid pulses are 0 on the right side, and 0 on the left side  Pulmonary/Chest: Effort normal and breath sounds normal  No respiratory distress  She has no wheezes  Abdominal: Soft  She exhibits no distension  Musculoskeletal: She exhibits no edema  Lymphadenopathy:     She has no cervical adenopathy  Neurological: She is alert and oriented to person, place, and time  She has normal reflexes  No cranial nerve deficit  Skin: Skin is warm  No rash noted  No erythema  No pallor  Psychiatric: Her behavior is normal  Judgment and thought content normal     anxious   Nursing note and vitals reviewed

## 2019-09-26 NOTE — TELEPHONE ENCOUNTER
Per Alex Amezquita's office called requesting records, pt's referral, copy of insurance cd  Last office note   and labs form 9/5/19 and the final results that pt had done yesterday 9/25 were faxed as they requested by Alex Denise

## 2019-09-26 NOTE — TELEPHONE ENCOUNTER
Hepatitis panel was normal     TSH was off but this was checked too soon, she had this done 9/5 and had medication change- recheck should be 6-8 weeks  I would continue on the new increased dose for a few more weeks then recheck 6-8 weeks from 9/5 TSH with reflex T4 please     Inflammatory markers were normal  There are still 2 labs that are pending

## 2019-09-27 ENCOUNTER — APPOINTMENT (OUTPATIENT)
Dept: LAB | Facility: CLINIC | Age: 44
End: 2019-09-27
Payer: COMMERCIAL

## 2019-09-27 ENCOUNTER — TRANSCRIBE ORDERS (OUTPATIENT)
Dept: LAB | Facility: CLINIC | Age: 44
End: 2019-09-27

## 2019-09-27 DIAGNOSIS — R53.83 OTHER FATIGUE: ICD-10-CM

## 2019-09-27 DIAGNOSIS — M25.50 ARTHRALGIA, UNSPECIFIED JOINT: ICD-10-CM

## 2019-09-27 DIAGNOSIS — R20.8 BURNING SENSATION: Primary | ICD-10-CM

## 2019-09-27 DIAGNOSIS — R20.8 BURNING SENSATION: ICD-10-CM

## 2019-09-27 DIAGNOSIS — E89.0 POSTOPERATIVE HYPOTHYROIDISM: Primary | ICD-10-CM

## 2019-09-27 LAB — RYE IGE QN: NEGATIVE

## 2019-09-27 PROCEDURE — 86618 LYME DISEASE ANTIBODY: CPT

## 2019-09-27 PROCEDURE — 36415 COLL VENOUS BLD VENIPUNCTURE: CPT

## 2019-09-27 NOTE — TELEPHONE ENCOUNTER
Labs ordered as discussed, the patient has seen Dr Cristal Coates in the past and is agreeable to scheduling an appointment with her again

## 2019-09-28 LAB — B BURGDOR IGG+IGM SER-ACNC: <0.91 ISR (ref 0–0.9)

## 2019-09-30 ENCOUNTER — OFFICE VISIT (OUTPATIENT)
Dept: FAMILY MEDICINE CLINIC | Facility: CLINIC | Age: 44
End: 2019-09-30
Payer: COMMERCIAL

## 2019-09-30 VITALS
DIASTOLIC BLOOD PRESSURE: 80 MMHG | HEIGHT: 70 IN | RESPIRATION RATE: 16 BRPM | SYSTOLIC BLOOD PRESSURE: 114 MMHG | HEART RATE: 85 BPM | BODY MASS INDEX: 30.35 KG/M2 | OXYGEN SATURATION: 99 % | WEIGHT: 212 LBS | TEMPERATURE: 97.8 F

## 2019-09-30 DIAGNOSIS — E89.0 POSTOPERATIVE HYPOTHYROIDISM: ICD-10-CM

## 2019-09-30 DIAGNOSIS — Z00.00 HEALTHCARE MAINTENANCE: ICD-10-CM

## 2019-09-30 DIAGNOSIS — K21.9 GASTROESOPHAGEAL REFLUX DISEASE, ESOPHAGITIS PRESENCE NOT SPECIFIED: ICD-10-CM

## 2019-09-30 DIAGNOSIS — F41.8 ANXIETY ABOUT HEALTH: ICD-10-CM

## 2019-09-30 DIAGNOSIS — R20.8 BURNING SENSATION OF SKIN: Primary | ICD-10-CM

## 2019-09-30 PROCEDURE — 99214 OFFICE O/P EST MOD 30 MIN: CPT | Performed by: FAMILY MEDICINE

## 2019-09-30 RX ORDER — FAMOTIDINE 20 MG/1
20 TABLET, FILM COATED ORAL 2 TIMES DAILY
Qty: 60 TABLET | Refills: 5 | Status: SHIPPED | OUTPATIENT
Start: 2019-09-30 | End: 2019-12-03

## 2019-10-03 ENCOUNTER — TELEPHONE (OUTPATIENT)
Dept: FAMILY MEDICINE CLINIC | Facility: CLINIC | Age: 44
End: 2019-10-03

## 2019-10-03 NOTE — TELEPHONE ENCOUNTER
Tell the patient to continue staying off of omeprazole    Let her know I am checking with Neurology regarding other testing

## 2019-10-03 NOTE — TELEPHONE ENCOUNTER
Patient stopped her Omeprazole on Monday  She has felt better Monday through Wednesday  Today she has the burning again and it is back in full force  She spoke to one of the nurses at her job and she suggested that she get her vitamin and hormone levels checked  Please advise      [phone]

## 2019-10-04 ENCOUNTER — TRANSCRIBE ORDERS (OUTPATIENT)
Dept: FAMILY MEDICINE CLINIC | Facility: CLINIC | Age: 44
End: 2019-10-04

## 2019-10-04 ENCOUNTER — APPOINTMENT (OUTPATIENT)
Dept: LAB | Facility: HOSPITAL | Age: 44
End: 2019-10-04
Payer: COMMERCIAL

## 2019-10-04 DIAGNOSIS — R20.8 BURNING SENSATION OF SKIN: Primary | ICD-10-CM

## 2019-10-04 DIAGNOSIS — R20.8 BURNING SENSATION OF SKIN: ICD-10-CM

## 2019-10-04 LAB
CALCIUM SERPL-MCNC: 8.9 MG/DL (ref 8.3–10.1)
FOLATE SERPL-MCNC: 3.6 NG/ML (ref 3.1–17.5)
MAGNESIUM SERPL-MCNC: 2.3 MG/DL (ref 1.6–2.6)
VIT B12 SERPL-MCNC: 214 PG/ML (ref 100–900)

## 2019-10-04 PROCEDURE — 84425 ASSAY OF VITAMIN B-1: CPT

## 2019-10-04 PROCEDURE — 82746 ASSAY OF FOLIC ACID SERUM: CPT

## 2019-10-04 PROCEDURE — 83735 ASSAY OF MAGNESIUM: CPT

## 2019-10-04 PROCEDURE — 82607 VITAMIN B-12: CPT

## 2019-10-04 PROCEDURE — 36415 COLL VENOUS BLD VENIPUNCTURE: CPT

## 2019-10-04 PROCEDURE — 84207 ASSAY OF VITAMIN B-6: CPT

## 2019-10-04 PROCEDURE — 84630 ASSAY OF ZINC: CPT

## 2019-10-04 PROCEDURE — 82310 ASSAY OF CALCIUM: CPT

## 2019-10-04 RX ORDER — GABAPENTIN 100 MG/1
CAPSULE ORAL
Qty: 30 CAPSULE | Refills: 0 | Status: SHIPPED | OUTPATIENT
Start: 2019-10-04 | End: 2019-10-27 | Stop reason: SDUPTHER

## 2019-10-04 NOTE — PROGRESS NOTES
Assessment/Plan:    Advised once again to stop omeprazole  Changed to famotidine twice daily  Reflux measures reviewed  Patient obviously stressed out with regards to these sensation she is feeling  This only began after starting omeprazole  She has no other real exposure issues  Labs reviewed which were all okay  TSH actually went up which does not make much sense now that she is taking it completely by itself and not near any stomach medication  Need to follow this closely  Labs reordered for 2 weeks and follow-up  Diagnoses and all orders for this visit:    Burning sensation of skin    Postoperative hypothyroidism  -     famotidine (PEPCID) 20 mg tablet; Take 1 tablet (20 mg total) by mouth 2 (two) times a day    Gastroesophageal reflux disease, esophagitis presence not specified    Anxiety about health    Healthcare maintenance  -     Lipid panel; Future  -     Comprehensive metabolic panel; Future        1  Burning sensation of skin     2  Postoperative hypothyroidism  famotidine (PEPCID) 20 mg tablet   3  Gastroesophageal reflux disease, esophagitis presence not specified     4  Anxiety about health     5  Healthcare maintenance  Lipid panel    Comprehensive metabolic panel       Subjective:        Patient ID: Fabiola Meckel is a 39 y o  female  Chief Complaint   Patient presents with    Follow-up     burning sensation on skin     Generalized Body Aches       Patient for continued burning sensation  Saw Dermatology  Dermatology and I did discuss the case  They cannot figure out anything else from a dermatological standpoint  The following portions of the patient's history were reviewed and updated as appropriate: past medical history, past surgical history and problem list       Review of Systems   Constitutional: Negative for appetite change, fatigue, fever and unexpected weight change     HENT: Negative for congestion, ear pain, postnasal drip, rhinorrhea, sinus pressure, sinus pain and sore throat  Eyes: Negative for redness and visual disturbance  Respiratory: Negative for chest tightness and shortness of breath  Cardiovascular: Negative for chest pain, palpitations and leg swelling  Gastrointestinal: Negative for abdominal distention, abdominal pain, diarrhea and nausea  Endocrine: Negative for cold intolerance and heat intolerance  Genitourinary: Negative for dysuria and hematuria  Musculoskeletal: Negative for arthralgias, gait problem and myalgias  Skin: Negative for pallor and rash  Burning skin sensation   Neurological: Negative for dizziness and headaches  Psychiatric/Behavioral: Negative for behavioral problems  The patient is nervous/anxious  Objective:  /80   Pulse 85   Temp 97 8 °F (36 6 °C)   Resp 16   Ht 5' 10" (1 778 m)   Wt 96 2 kg (212 lb)   SpO2 99%   BMI 30 42 kg/m²        Physical Exam   Constitutional: She is oriented to person, place, and time  She appears well-nourished  No distress  HENT:   Head: Normocephalic and atraumatic  Right Ear: Tympanic membrane normal    Left Ear: Tympanic membrane normal    Mouth/Throat: Oropharynx is clear and moist    Eyes: Pupils are equal, round, and reactive to light  Conjunctivae and EOM are normal  No scleral icterus  Neck: Normal range of motion  Neck supple  No thyromegaly present  Cardiovascular: Normal rate, regular rhythm, normal heart sounds and intact distal pulses  No murmur heard  Pulses:       Carotid pulses are 0 on the right side, and 0 on the left side  Pulmonary/Chest: Effort normal and breath sounds normal  No respiratory distress  She has no wheezes  Abdominal: Soft  She exhibits no distension  Musculoskeletal: She exhibits no edema  Lymphadenopathy:     She has no cervical adenopathy  Neurological: She is alert and oriented to person, place, and time  She has normal reflexes  No cranial nerve deficit  Skin: Skin is warm  No pallor     Psychiatric: She has a normal mood and affect  Her behavior is normal  Judgment and thought content normal    Nursing note and vitals reviewed

## 2019-10-04 NOTE — TELEPHONE ENCOUNTER
Pt is aware and would like to try the Gabapentin  Dr Rama Fontaine spoke to pt personally as well       Please advise and authorize, thanks

## 2019-10-04 NOTE — TELEPHONE ENCOUNTER
----- Message from Maurice Allen MA sent at 10/4/2019 11:32 AM EDT -----  I am ordering the blood work for patient  Can you call her and make her aware of the rest please    ----- Message -----  From: Niru Beckwith DO  Sent: [id number]/8/7394   7:53 PM EDT  To: Maurice Allen MA    c me am re labs  Dr Cong Aceves  ----- Message -----  From: Indigo Das DO  Sent: 10/3/2019   3:55 PM EDT  To: DO Eranest Daly Belva,     I reviewed her chart , labs etc, dora, esr, crp all negative       It sounds like she is experiencing burning dysthesias  They are nonspecific     Could w/u  for vitamins def     B12, folate , b1, b6, mag, calcium, zinc  After labs are done , ask her to take a mvi     Other options are seeing an allergist , the use of low dose of gabapentin      Hope this helps     Monique

## 2019-10-06 LAB — ZINC SERPL-MCNC: 85 UG/DL (ref 56–134)

## 2019-10-08 LAB
VIT B1 BLD-SCNC: 87.1 NMOL/L (ref 66.5–200)
VIT B6 SERPL-MCNC: 2 UG/L (ref 2–32.8)

## 2019-10-09 ENCOUNTER — TELEPHONE (OUTPATIENT)
Dept: FAMILY MEDICINE CLINIC | Facility: CLINIC | Age: 44
End: 2019-10-09

## 2019-10-09 NOTE — TELEPHONE ENCOUNTER
Tell the patient her   B12 was low with normal being over 400 and she was 214  And her B6 was definitely borderline low  Let her know sometimes with B12 deficiency we sometimes will offer an injection weekly for 4 weeks and then monthly for 6 months which helps boost the B12 up  B6 she can buy over-the-counter which would be a daily dose  This could play a role may be with  her current symptoms  Ask our her current symptoms are    If she is not interested in the shot then I would do B6 daily over-the-counter and B12 daily over-the-counter which is usually 1000 units daily

## 2019-10-09 NOTE — TELEPHONE ENCOUNTER
Pt states that she is still having the same sx  She states it is tolerable during the day but at night it is keeping her up  She is interested in the b12 injection and made an appt for nurse visit tomorrow  She also saw that her B1 and Folate were low and was wondering what you thought about that   Please advise, thanks

## 2019-10-09 NOTE — TELEPHONE ENCOUNTER
Correct the B1 is in range but is in low normal as well as the folate low normal   She should be able of fine both of these over-the-counter and can start supplementing with them in addition to the injections that we are doing  As long as she is stable I would like an update in 2 weeks after her 2nd B12 shot    If not improving will try to get her in to Neurology ASAP

## 2019-10-10 ENCOUNTER — CLINICAL SUPPORT (OUTPATIENT)
Dept: FAMILY MEDICINE CLINIC | Facility: CLINIC | Age: 44
End: 2019-10-10
Payer: COMMERCIAL

## 2019-10-10 DIAGNOSIS — E53.8 B12 DEFICIENCY: Primary | ICD-10-CM

## 2019-10-10 DIAGNOSIS — E51.9 VITAMIN B1 DEFICIENCY: Primary | ICD-10-CM

## 2019-10-10 PROCEDURE — 96372 THER/PROPH/DIAG INJ SC/IM: CPT | Performed by: FAMILY MEDICINE

## 2019-10-10 RX ORDER — CYANOCOBALAMIN 1000 UG/ML
1000 INJECTION INTRAMUSCULAR; SUBCUTANEOUS WEEKLY
Status: COMPLETED | OUTPATIENT
Start: 2019-10-10 | End: 2019-10-31

## 2019-10-10 RX ADMIN — CYANOCOBALAMIN 1000 MCG: 1000 INJECTION INTRAMUSCULAR; SUBCUTANEOUS at 16:21

## 2019-10-10 NOTE — PROGRESS NOTES
Here for B12 injection, 1000 mcg given IM left deltoid without incidence  Return in 1 week for B12 injection

## 2019-10-15 ENCOUNTER — OFFICE VISIT (OUTPATIENT)
Dept: FAMILY MEDICINE CLINIC | Facility: CLINIC | Age: 44
End: 2019-10-15
Payer: COMMERCIAL

## 2019-10-15 VITALS
BODY MASS INDEX: 29.63 KG/M2 | OXYGEN SATURATION: 98 % | DIASTOLIC BLOOD PRESSURE: 72 MMHG | HEART RATE: 94 BPM | TEMPERATURE: 98.4 F | WEIGHT: 207 LBS | SYSTOLIC BLOOD PRESSURE: 128 MMHG | HEIGHT: 70 IN

## 2019-10-15 DIAGNOSIS — K21.9 GASTROESOPHAGEAL REFLUX DISEASE, ESOPHAGITIS PRESENCE NOT SPECIFIED: Primary | ICD-10-CM

## 2019-10-15 DIAGNOSIS — R00.0 TACHYCARDIA: ICD-10-CM

## 2019-10-15 DIAGNOSIS — R07.89 ATYPICAL CHEST PAIN: ICD-10-CM

## 2019-10-15 PROCEDURE — 93000 ELECTROCARDIOGRAM COMPLETE: CPT | Performed by: NURSE PRACTITIONER

## 2019-10-15 PROCEDURE — 99214 OFFICE O/P EST MOD 30 MIN: CPT | Performed by: NURSE PRACTITIONER

## 2019-10-15 NOTE — PROGRESS NOTES
Assessment/Plan:    GERD/ Tachycardia/ Atypical Chest Pain   EKG WNL  Pain is reproducible when palpating sternum and epigastric area  Bowel sounds hyperactive  Symptoms likely secondary to GI as she has been without acid reflux medication  She did have recent EGD that showed evidence of reflux and hiatal hernia  Patient is to start pepcid, can use tums as needed  Discussed medication administration instructions  To avoid NSAIDs and continue with dietary changes to help GERD symptoms  Will refer to GI for future management if needed  Advised if no improvement to call and we can trial carafate as well  Will also update stress test and complete echocardiogram to be thorough although chest pain is atypical  Handout on GERD and chest pain given to patient  Please call the office if you are experiencing any worsening of symptoms or no symptom improvement  Diagnoses and all orders for this visit:    Gastroesophageal reflux disease, esophagitis presence not specified  -     Ambulatory referral to Gastroenterology; Future    Tachycardia  -     POCT ECG  -     Echo complete with contrast if indicated; Future  -     Stress test only, exercise; Future    Atypical chest pain  -     Echo complete with contrast if indicated; Future  -     Stress test only, exercise; Future      Patient verbalizes understand and agrees with treatment plan  Subjective:        Patient ID: Ming Dye is a 39 y o  female  Chief Complaint   Patient presents with    Pain     in upper back radiating into chest; no SOB indicated  fever intermittently; pt states last night when she was laying in bed her heart was beating faster than normal; all symptoms started last night  Patient presents to office today for evaluation of pain in her upper back as well as chest radiation  Patient denies any shortness of breath or diaphoresis  This pain is not related to exertion    Patient states that last time she was lying in bed she felt that her heart was beating faster than normal   All of this started last night  At that time she was having intermittent chest pain but that has not resolved  She states now her chest just feels tight and sore all the time  Patient states that this pain is more so relieved when she is in a slouching position  Patient did recently stop omeprazole due to side effects that have since resolved but the side effects she was having prior to starting the omeprazole have returned such as acid reflux gas bloating and some intermittent paresthesias which she has had prior  She has not yet started the Pepcid  Patient states that she had potato soup from while off last night before this happened  She states she has been making dietary changes and cutting as greasy foods and spicy foods as well as carbonated and caffeinated beverages  She states that there are times that she still fevers on and off but this is for the past 6 weeks  She states she does not check her temperature at these times she is unsure if this is a true fever or just the chills  Patient denies any cardiac history  She has had 2 EKGs done in the past   She states she has had a stress test done but that was about 10 years ago  The following portions of the patient's history were reviewed and updated as appropriate: allergies, current medications, past family history, past social history and problem list     Review of Systems   Constitutional: Positive for chills  Negative for fever  HENT: Negative for congestion  Eyes: Negative for pain and visual disturbance  Respiratory: Positive for chest tightness  Negative for cough and shortness of breath  Cardiovascular: Positive for chest pain  Negative for palpitations and leg swelling  Gastrointestinal: Negative for abdominal pain, diarrhea, nausea and vomiting  Gas   Genitourinary: Negative for difficulty urinating and dysuria     Musculoskeletal: Negative for arthralgias and myalgias  Skin: Negative for color change and rash  Neurological: Negative for dizziness, syncope, numbness and headaches  Hematological: Negative for adenopathy  Psychiatric/Behavioral: Negative for agitation and behavioral problems  The patient is not nervous/anxious  Objective:  /72 (BP Location: Left arm, Patient Position: Sitting, Cuff Size: Large)   Pulse 94   Temp 98 4 °F (36 9 °C) (Temporal)   Ht 5' 10" (1 778 m)   Wt 93 9 kg (207 lb)   SpO2 98%   BMI 29 70 kg/m²      Physical Exam   Constitutional: She is oriented to person, place, and time  She appears well-developed  No distress  overweight   HENT:   Head: Normocephalic and atraumatic  Right Ear: External ear normal    Left Ear: External ear normal    Nose: Nose normal    Eyes: Conjunctivae and lids are normal  Right eye exhibits no discharge  Left eye exhibits no discharge  Neck: Normal range of motion  Neck supple  No tracheal deviation present  Cardiovascular: Normal rate and regular rhythm  No murmur heard  Pulmonary/Chest: Effort normal and breath sounds normal  No respiratory distress  She has no wheezes  She exhibits tenderness  She exhibits no edema, no deformity and no swelling  Abdominal: She exhibits no distension and no abdominal bruit  Bowel sounds are increased  There is tenderness in the epigastric area  There is no rigidity, no rebound, no guarding, no tenderness at McBurney's point and negative Smalls's sign  Musculoskeletal: She exhibits no edema or deformity  Lymphadenopathy:     She has no cervical adenopathy  Neurological: She is alert and oriented to person, place, and time  Coordination normal    Skin: Skin is warm and dry  No rash noted  She is not diaphoretic  No erythema  Psychiatric: She has a normal mood and affect  Her speech is normal and behavior is normal  Judgment and thought content normal  Cognition and memory are normal    Nursing note and vitals reviewed

## 2019-10-15 NOTE — PATIENT INSTRUCTIONS
Start Pepcid, separate this from other medications by 4-6 hours if needed  Use Tums as needed  If not well controlled please call and we can add carafate  Make dietary modifications to help acid reflux  Make appointment for stress test and echocardiogram      Make appointment with GI doctor  Please call the office if you are experiencing any worsening of symptoms or no symptom improvement  Gastroesophageal Reflux Disease   AMBULATORY CARE:   Gastroesophageal reflux  reflux occurs when acid and food in the stomach back up into the esophagus  Gastroesophageal reflux disease (GERD) is reflux that occurs more than twice a week for a few weeks  It usually causes heartburn and other symptoms  GERD can cause other health problems over time if it is not treated  Common symptoms include:  Heartburn is the most common symptom of GERD  You may feel burning pain in your chest or below the breast bone  This usually occurs after meals and spreads to your neck, jaw, or shoulder  The pain gets better when you change positions  You may also have any of the following:  · Bitter or acid taste in your mouth    · Dry cough    · Trouble swallowing or pain with swallowing    · Hoarseness or sore throat    · Frequent burping or hiccups    · Feeling of fullness soon after you start eating  Seek care immediately if:  · You feel full and cannot burp or vomit  · You have severe chest pain and sudden trouble breathing  · Your bowel movements are black, bloody, or tarry-looking  · Your vomit looks like coffee grounds or has blood in it  Contact your healthcare provider if:   · You vomit large amounts, or you vomit often  · You have trouble breathing after you vomit  · You have trouble swallowing, or pain with swallowing  · You are losing weight without trying  · Your symptoms get worse or do not improve with treatment  · You have questions or concerns about your condition or care    Treatment for GERD:  Your healthcare provider may prescribe medicine to decrease stomach acid  He may also prescribe medicine that help your esophagus and stomach move food and liquid to your intestines  Surgery may be done if other treatments do not work  You may need surgery to wrap the upper part of the stomach around the esophageal sphincter  This will strengthen the sphincter and prevent reflux  Manage GERD:   · Do not have foods or drinks that may increase heartburn  These include chocolate, peppermint, fried or fatty foods, drinks that contain caffeine, or carbonated drinks (soda)  Other foods include spicy foods, onions, tomatoes, and tomato-based foods  Do not have foods or drinks that can irritate your esophagus, such as citrus fruits, juices, and alcohol  · Do not eat large meals  When you eat a lot of food at one time, your stomach needs more acid to digest it  Eat 6 small meals each day instead of 3 large ones, and eat slowly  Do not eat meals 2 to 3 hours before bedtime  · Elevate the head of your bed  Place 6-inch blocks under the head of your bed frame  You may also use more than one pillow under your head and shoulders while you sleep  · Maintain a healthy weight  If you are overweight, weight loss may help relieve symptoms of GERD  · Do not smoke  Smoking weakens the lower esophageal sphincter and increases the risk of GERD  Ask your healthcare provider for information if you currently smoke and need help to quit  E-cigarettes or smokeless tobacco still contain nicotine  Talk to your healthcare provider before you use these products  · Do not wear clothing that is tight around your waist   Tight clothing can put pressure on your stomach and cause or worsen GERD symptoms  Follow up with your healthcare provider as directed:  Write down your questions so you remember to ask them during your visits    © 2017 [facility] Information is for End User's use only and may not be sold, redistributed or otherwise used for commercial purposes  All illustrations and images included in CareNotes® are the copyrighted property of [employer]  or [employer]aston [employer]  The above information is an  only  It is not intended as medical advice for individual conditions or treatments  Talk to your doctor, nurse or pharmacist before following any medical regimen to see if it is safe and effective for you  Chest Pain   WHAT YOU NEED TO KNOW:   Chest pain can be caused by a range of conditions, from not serious to life-threatening  Chest pain can be a symptom of a digestive problem, such as acid reflux or a stomach ulcer  An anxiety attack or a strong emotion, such as anger, can also cause chest pain  Infection, inflammation, or a fracture in the bones or cartilage in your chest can cause pain or discomfort  Sometimes chest pain or pressure is caused by poor blood flow to your heart (angina)  Chest pain may also be caused by life-threatening conditions such as a heart attack or blood clot in your lungs  DISCHARGE INSTRUCTIONS:   Call 911 if:   · You have any of the following signs of a heart attack:      ¨ Squeezing, pressure, or pain in your chest that lasts longer than 5 minutes or returns    ¨ Discomfort or pain in your back, neck, jaw, stomach, or arm     ¨ Trouble breathing    ¨ Nausea or vomiting    ¨ Lightheadedness or a sudden cold sweat, especially with chest pain or trouble breathing    Return to the emergency department if:   · You have chest discomfort that gets worse, even with medicine  · You cough or vomit blood  · Your bowel movements are black or bloody  · You cannot stop vomiting, or it hurts to swallow  Contact your healthcare provider if:   · You have questions or concerns about your condition or care  Medicines:   · Medicines  may be given to treat the cause of your chest pain   Examples include pain medicine, anxiety medicine, or medicines to increase blood flow to your heart  · Do not take certain medicines without asking your healthcare provider first   These include NSAIDs, herbal or vitamin supplements, or hormones (estrogen or progestin)  · Take your medicine as directed  Contact your healthcare provider if you think your medicine is not helping or if you have side effects  Tell him or her if you are allergic to any medicine  Keep a list of the medicines, vitamins, and herbs you take  Include the amounts, and when and why you take them  Bring the list or the pill bottles to follow-up visits  Carry your medicine list with you in case of an emergency  Follow up with your healthcare provider within 72 hours, or as directed: You may need to return for more tests to find the cause of your chest pain  You may be referred to a specialist, such as a cardiologist or gastroenterologist  Write down your questions so you remember to ask them during your visits  Healthy living tips: The following are general healthy guidelines  If your chest pain is caused by a heart problem, your healthcare provider will give you specific guidelines to follow  · Do not smoke  Nicotine and other chemicals in cigarettes and cigars can cause lung and heart damage  Ask your healthcare provider for information if you currently smoke and need help to quit  E-cigarettes or smokeless tobacco still contain nicotine  Talk to your healthcare provider before you use these products  · Eat a variety of healthy, low-fat foods  Healthy foods include fruits, vegetables, whole-grain breads, low-fat dairy products, beans, lean meats, and fish  Ask for more information about a heart healthy diet  · Ask about activity  Your healthcare provider will tell you which activities to limit or avoid  Ask when you can drive, return to work, and have sex  Ask about the best exercise plan for you  · Maintain a healthy weight  Ask your healthcare provider how much you should weigh  Ask him or her to help you create a weight loss plan if you are overweight  · Get the flu and pneumonia vaccines  All adults should get the influenza (flu) vaccine  Get it every year as soon as it becomes available  The pneumococcal vaccine is given to adults aged 72 years or older  The vaccine is given every 5 years to prevent pneumococcal disease, such as pneumonia  © 2017 [address] Information is for End User's use only and may not be sold, redistributed or otherwise used for commercial purposes  All illustrations and images included in CareNotes® are the copyrighted property of [employer]  or Gaston Shearer  The above information is an  only  It is not intended as medical advice for individual conditions or treatments  Talk to your doctor, nurse or pharmacist before following any medical regimen to see if it is safe and effective for you

## 2019-10-17 ENCOUNTER — CLINICAL SUPPORT (OUTPATIENT)
Dept: FAMILY MEDICINE CLINIC | Facility: CLINIC | Age: 44
End: 2019-10-17
Payer: COMMERCIAL

## 2019-10-17 ENCOUNTER — APPOINTMENT (OUTPATIENT)
Dept: LAB | Facility: CLINIC | Age: 44
End: 2019-10-17
Payer: COMMERCIAL

## 2019-10-17 DIAGNOSIS — Z00.00 HEALTHCARE MAINTENANCE: ICD-10-CM

## 2019-10-17 DIAGNOSIS — E53.8 B12 DEFICIENCY: Primary | ICD-10-CM

## 2019-10-17 DIAGNOSIS — E89.0 POSTOPERATIVE HYPOTHYROIDISM: ICD-10-CM

## 2019-10-17 LAB
ALBUMIN SERPL BCP-MCNC: 3.9 G/DL (ref 3.5–5)
ALP SERPL-CCNC: 81 U/L (ref 46–116)
ALT SERPL W P-5'-P-CCNC: 23 U/L (ref 12–78)
ANION GAP SERPL CALCULATED.3IONS-SCNC: 6 MMOL/L (ref 4–13)
AST SERPL W P-5'-P-CCNC: 12 U/L (ref 5–45)
BILIRUB SERPL-MCNC: 0.25 MG/DL (ref 0.2–1)
BUN SERPL-MCNC: 6 MG/DL (ref 5–25)
CALCIUM SERPL-MCNC: 9.2 MG/DL (ref 8.3–10.1)
CHLORIDE SERPL-SCNC: 108 MMOL/L (ref 100–108)
CHOLEST SERPL-MCNC: 131 MG/DL (ref 50–200)
CO2 SERPL-SCNC: 25 MMOL/L (ref 21–32)
CREAT SERPL-MCNC: 0.74 MG/DL (ref 0.6–1.3)
GFR SERPL CREATININE-BSD FRML MDRD: 99 ML/MIN/1.73SQ M
GLUCOSE P FAST SERPL-MCNC: 95 MG/DL (ref 65–99)
HDLC SERPL-MCNC: 29 MG/DL (ref 40–60)
LDLC SERPL CALC-MCNC: 72 MG/DL (ref 0–100)
NONHDLC SERPL-MCNC: 102 MG/DL
POTASSIUM SERPL-SCNC: 3.9 MMOL/L (ref 3.5–5.3)
PROT SERPL-MCNC: 7 G/DL (ref 6.4–8.2)
SODIUM SERPL-SCNC: 139 MMOL/L (ref 136–145)
T3FREE SERPL-MCNC: 3.81 PG/ML (ref 2.3–4.2)
T4 FREE SERPL-MCNC: 1.98 NG/DL (ref 0.76–1.46)
TRIGL SERPL-MCNC: 152 MG/DL
TSH SERPL DL<=0.05 MIU/L-ACNC: 0.08 UIU/ML (ref 0.36–3.74)

## 2019-10-17 PROCEDURE — 96372 THER/PROPH/DIAG INJ SC/IM: CPT | Performed by: FAMILY MEDICINE

## 2019-10-17 PROCEDURE — 36415 COLL VENOUS BLD VENIPUNCTURE: CPT

## 2019-10-17 PROCEDURE — 84439 ASSAY OF FREE THYROXINE: CPT

## 2019-10-17 PROCEDURE — 84481 FREE ASSAY (FT-3): CPT

## 2019-10-17 PROCEDURE — 84482 T3 REVERSE: CPT

## 2019-10-17 PROCEDURE — 80053 COMPREHEN METABOLIC PANEL: CPT

## 2019-10-17 PROCEDURE — 86376 MICROSOMAL ANTIBODY EACH: CPT

## 2019-10-17 PROCEDURE — 86800 THYROGLOBULIN ANTIBODY: CPT

## 2019-10-17 PROCEDURE — 80061 LIPID PANEL: CPT

## 2019-10-17 PROCEDURE — 84443 ASSAY THYROID STIM HORMONE: CPT

## 2019-10-17 RX ADMIN — CYANOCOBALAMIN 1000 MCG: 1000 INJECTION INTRAMUSCULAR; SUBCUTANEOUS at 15:54

## 2019-10-18 LAB
THYROGLOB AB SERPL-ACNC: 20.7 IU/ML (ref 0–0.9)
THYROPEROXIDASE AB SERPL-ACNC: 222 IU/ML (ref 0–34)

## 2019-10-22 ENCOUNTER — TELEPHONE (OUTPATIENT)
Dept: FAMILY MEDICINE CLINIC | Facility: CLINIC | Age: 44
End: 2019-10-22

## 2019-10-22 NOTE — TELEPHONE ENCOUNTER
Tell her that the TSH has now corrected and actually has over corrected just by a bit  If she is on 250 mcg per day what I would do is 1 day a week just take 200 and not the extra 50   We will review this on Thursday

## 2019-10-22 NOTE — TELEPHONE ENCOUNTER
Pt states that she saw her BW results and saw her TSH was elevated she was wondering if you would like to discuss this at her office visit on Thursday or over the phone   Please advise and authorize, thanks

## 2019-10-23 LAB — T3REVERSE SERPL-MCNC: 32.1 NG/DL (ref 9.2–24.1)

## 2019-10-24 ENCOUNTER — OFFICE VISIT (OUTPATIENT)
Dept: FAMILY MEDICINE CLINIC | Facility: CLINIC | Age: 44
End: 2019-10-24
Payer: COMMERCIAL

## 2019-10-24 VITALS
WEIGHT: 208 LBS | RESPIRATION RATE: 16 BRPM | OXYGEN SATURATION: 98 % | DIASTOLIC BLOOD PRESSURE: 78 MMHG | SYSTOLIC BLOOD PRESSURE: 112 MMHG | HEIGHT: 70 IN | HEART RATE: 88 BPM | TEMPERATURE: 98.4 F | BODY MASS INDEX: 29.78 KG/M2

## 2019-10-24 DIAGNOSIS — R20.8 BURNING SENSATION OF SKIN: ICD-10-CM

## 2019-10-24 PROCEDURE — 99214 OFFICE O/P EST MOD 30 MIN: CPT | Performed by: FAMILY MEDICINE

## 2019-10-24 PROCEDURE — 96372 THER/PROPH/DIAG INJ SC/IM: CPT | Performed by: FAMILY MEDICINE

## 2019-10-24 PROCEDURE — 3008F BODY MASS INDEX DOCD: CPT | Performed by: FAMILY MEDICINE

## 2019-10-24 RX ORDER — LEVOTHYROXINE SODIUM 0.03 MG/1
25 TABLET ORAL DAILY
Refills: 2 | COMMUNITY
Start: 2019-10-19 | End: 2019-11-04

## 2019-10-24 RX ADMIN — CYANOCOBALAMIN 1000 MCG: 1000 INJECTION INTRAMUSCULAR; SUBCUTANEOUS at 17:56

## 2019-10-27 DIAGNOSIS — R20.8 BURNING SENSATION OF SKIN: ICD-10-CM

## 2019-10-28 RX ORDER — GABAPENTIN 100 MG/1
CAPSULE ORAL
Qty: 30 CAPSULE | Refills: 5 | Status: SHIPPED | OUTPATIENT
Start: 2019-10-28 | End: 2019-11-21 | Stop reason: SDUPTHER

## 2019-10-29 ENCOUNTER — TELEPHONE (OUTPATIENT)
Dept: FAMILY MEDICINE CLINIC | Facility: CLINIC | Age: 44
End: 2019-10-29

## 2019-10-29 NOTE — TELEPHONE ENCOUNTER
Pt called asking her FMLA paper work to be done ASAP  She also stated that every time she eats she is belching for 2-3 hours after she is done eating  She is wondering if there is anything that she can take to help this   Please advise, thanks

## 2019-10-31 ENCOUNTER — CLINICAL SUPPORT (OUTPATIENT)
Dept: FAMILY MEDICINE CLINIC | Facility: CLINIC | Age: 44
End: 2019-10-31
Payer: COMMERCIAL

## 2019-10-31 DIAGNOSIS — E53.8 VITAMIN B12 DEFICIENCY: Primary | ICD-10-CM

## 2019-10-31 PROCEDURE — 96372 THER/PROPH/DIAG INJ SC/IM: CPT | Performed by: FAMILY MEDICINE

## 2019-10-31 RX ORDER — CYANOCOBALAMIN 1000 UG/ML
1000 INJECTION INTRAMUSCULAR; SUBCUTANEOUS ONCE
Status: CANCELLED | OUTPATIENT
Start: 2019-10-31

## 2019-10-31 RX ORDER — GABAPENTIN 100 MG/1
100 CAPSULE ORAL 3 TIMES DAILY
Qty: 90 CAPSULE | Refills: 5 | Status: SHIPPED | OUTPATIENT
Start: 2019-10-31 | End: 2020-06-16

## 2019-10-31 RX ADMIN — CYANOCOBALAMIN 1000 MCG: 1000 INJECTION INTRAMUSCULAR; SUBCUTANEOUS at 16:48

## 2019-10-31 NOTE — PROGRESS NOTES
Assessment/Plan:  Gabapentin seems to be helping  Patient can titrate gabapentin up to 3 times daily  Needs to cut back on levothyroxine is now TSH is live running low  Recommend cut on levothyroxine by just taking 200 mcg on Sundays  Pre metabolic syndrome reviewed with regards to recent labs showing elevated triglycerides and low HDL  Needs aggressive dietary management  Continue B12 with regards to B 12 deficiency  Hold neurological referral at this time  Diagnoses and all orders for this visit:    Burning sensation of skin  -     gabapentin (NEURONTIN) 100 mg capsule; Take 1 capsule (100 mg total) by mouth 3 (three) times a day Take 1 tablet at bedtime    Other orders  -     levothyroxine 25 mcg tablet; Take 25 mcg by mouth daily        1  Burning sensation of skin  gabapentin (NEURONTIN) 100 mg capsule       Subjective:        Patient ID: Sara Lobo is a 39 y o  female  Chief Complaint   Patient presents with    Follow-up     1 month follow up     Injections     B12        Patient here today for recheck  States burning sensation has improved to a degree  On B12 injections  Recent labs done  The following portions of the patient's history were reviewed and updated as appropriate: past medical history, past surgical history and problem list       Review of Systems   Constitutional: Negative for appetite change, fatigue, fever and unexpected weight change  HENT: Negative for congestion, ear pain, postnasal drip, rhinorrhea, sinus pressure, sinus pain and sore throat  Eyes: Negative for redness and visual disturbance  Respiratory: Negative for chest tightness and shortness of breath  Cardiovascular: Negative for chest pain, palpitations and leg swelling  Gastrointestinal: Negative for abdominal distention, abdominal pain, diarrhea and nausea  Endocrine: Negative for cold intolerance and heat intolerance  Genitourinary: Negative for dysuria and hematuria     Musculoskeletal: Negative for arthralgias, gait problem and myalgias  Skin: Negative for pallor and rash  Improved overall burning sensation to skin   Neurological: Negative for dizziness and headaches  Psychiatric/Behavioral: Negative for behavioral problems  The patient is not nervous/anxious  Objective:  /78   Pulse 88   Temp 98 4 °F (36 9 °C) (Temporal)   Resp 16   Ht 5' 10" (1 778 m)   Wt 94 3 kg (208 lb)   SpO2 98%   BMI 29 84 kg/m²        Physical Exam   Constitutional: She is oriented to person, place, and time  She appears well-nourished  No distress  HENT:   Head: Normocephalic and atraumatic  Right Ear: Tympanic membrane normal    Left Ear: Tympanic membrane normal    Mouth/Throat: Oropharynx is clear and moist    Eyes: Pupils are equal, round, and reactive to light  Conjunctivae and EOM are normal  No scleral icterus  Neck: Neck supple  No thyromegaly present  Cardiovascular: Normal rate, regular rhythm and normal heart sounds  No murmur heard  Pulses:       Carotid pulses are 0 on the right side, and 0 on the left side  Pulmonary/Chest: Effort normal and breath sounds normal  No respiratory distress  She has no wheezes  Abdominal: Soft  She exhibits no distension  Musculoskeletal: She exhibits no edema  Lymphadenopathy:     She has no cervical adenopathy  Neurological: She is alert and oriented to person, place, and time  She has normal reflexes  No cranial nerve deficit  Skin: Skin is warm  No pallor  Psychiatric: She has a normal mood and affect  Her behavior is normal  Judgment and thought content normal    Nursing note and vitals reviewed

## 2019-11-04 ENCOUNTER — TELEPHONE (OUTPATIENT)
Dept: FAMILY MEDICINE CLINIC | Facility: CLINIC | Age: 44
End: 2019-11-04

## 2019-11-04 NOTE — TELEPHONE ENCOUNTER
----- Message from Simon Bedolla DO sent at 87/9/7947 10:42 AM EST -----  Please double check exactly how the patient is taking her levothyroxine as there is now 3 doses in her chart  ----- Message -----  From: Simon Bedolla DO  Sent: [id number]/[id number]   7:35 PM EST  To: Gaetano Goldman MA    See me tomorrow so we can confirm her current dose of levothyroxine    Looks like maybe doctor INOCENCIO approved of refill back in September that may not be needed

## 2019-11-06 ENCOUNTER — TELEPHONE (OUTPATIENT)
Dept: FAMILY MEDICINE CLINIC | Facility: CLINIC | Age: 44
End: 2019-11-06

## 2019-11-06 NOTE — TELEPHONE ENCOUNTER
Abraham Milner called the office from [facility] in [employer] FMLA paper work it needs more clarification  Abraham Milner informed me that the item on pt's FMLA paper work from 10/21/19  that needs more clarification is on page 3 out of 5 item 1 probable duration of condition it says unknown pending treatment outcome  Abraham Milner explained they need more information for example it is your conclusion but they need a time frame for treatment time vickey Jose's number is [phone] extension [phone]

## 2019-11-09 DIAGNOSIS — R20.8 BURNING SENSATION OF SKIN: Primary | ICD-10-CM

## 2019-11-11 RX ORDER — PREDNISONE 1 MG/1
TABLET ORAL
Qty: 30 TABLET | Refills: 1 | Status: SHIPPED | OUTPATIENT
Start: 2019-11-11 | End: 2019-11-21 | Stop reason: ALTCHOICE

## 2019-11-15 DIAGNOSIS — E03.9 HYPOTHYROIDISM, UNSPECIFIED TYPE: ICD-10-CM

## 2019-11-15 RX ORDER — LEVOTHYROXINE SODIUM 0.2 MG/1
200 TABLET ORAL DAILY
Qty: 30 TABLET | Refills: 2 | Status: SHIPPED | OUTPATIENT
Start: 2019-11-15 | End: 2020-06-16 | Stop reason: SDUPTHER

## 2019-11-21 ENCOUNTER — ANNUAL EXAM (OUTPATIENT)
Dept: OBGYN CLINIC | Facility: MEDICAL CENTER | Age: 44
End: 2019-11-21
Payer: COMMERCIAL

## 2019-11-21 VITALS
BODY MASS INDEX: 30.21 KG/M2 | HEIGHT: 70 IN | DIASTOLIC BLOOD PRESSURE: 62 MMHG | SYSTOLIC BLOOD PRESSURE: 104 MMHG | WEIGHT: 211 LBS

## 2019-11-21 DIAGNOSIS — Z11.51 SCREENING FOR HUMAN PAPILLOMAVIRUS (HPV): ICD-10-CM

## 2019-11-21 DIAGNOSIS — Z12.31 ENCOUNTER FOR SCREENING MAMMOGRAM FOR MALIGNANT NEOPLASM OF BREAST: ICD-10-CM

## 2019-11-21 DIAGNOSIS — Z01.419 ENCOUNTER FOR ROUTINE GYNECOLOGICAL EXAMINATION WITH PAPANICOLAOU SMEAR OF CERVIX: Primary | ICD-10-CM

## 2019-11-21 PROCEDURE — S0612 ANNUAL GYNECOLOGICAL EXAMINA: HCPCS | Performed by: OBSTETRICS & GYNECOLOGY

## 2019-11-21 NOTE — PROGRESS NOTES
ASSESSMENT & PLAN: Haven Epley was seen today for gynecologic exam     Diagnoses and all orders for this visit:    Encounter for routine gynecological examination with Papanicolaou smear of cervix  -     Thinprep Tis Pap and HPV mRNA E6/E7 Reflex HPV 16,18/45    Screening for human papillomavirus (HPV)  -     Thinprep Tis Pap and HPV mRNA E6/E7 Reflex HPV 16,18/45    Encounter for screening mammogram for malignant neoplasm of breast  -     Mammo screening bilateral w 3d & cad; Future    Discussion/Summary:  Patient here for yearly gyn preventive exam; no new health issues    1  Routine well woman exam done today  2  Pap and HPV:  The patient's pap is not up to date  Pap and cotesting was done today  Current ASCCP Guidelines reviewed  3   Mammogram was ordered  4  The following were reviewed in today's visit: breast self exam, adequate intake of calcium and vitamin D, exercise and healthy diet  5  F/u 1 year  CC:  Annual Gynecologic Examination    HPI: Mari Sagastume is a 39 y o  [place] who presents for annual gynecologic examination  She has the following concerns:  none    Health Maintenance:    Patient describes her health as good  Patient does not have weight concerns  She exercises 7 days per week with work and walking  She does wear her seatbelt routinely  She does perform regular monthly self breast exams  She does feel safe at home  Patients does not follow a  diet        Her pap: 2014   Last mammogram: 2018    Patient Active Problem List   Diagnosis    Cervical radiculopathy    Chronic lymphocytic thyroiditis    Postoperative hypothyroidism    Lumbar radiculopathy    Obesity    Sciatica    Leukemoid reaction       Past Medical History:   Diagnosis Date    Abnormal Pap smear of cervix     with ASUS favoring benign, last assessed 1/9/14    Tinnitus     hypothyroid       Past Surgical History:   Procedure Laterality Date    TONSILLECTOMY AND ADENOIDECTOMY      TOTAL THYROIDECTOMY N/A 06/21/2014    Dr Parish Sánchez       Past OB/Gyn History:  Pt does not have menstrual issues,   History of sexually transmitted infection: No   History of abnormal pap smears: No    Patient is not currently sexually active  The current method of family planning is none  Family History   Problem Relation Age of Onset    Diabetes Son     Diabetes type II Mother     Hypertension Father         benign essential       Social History:  Social History     Socioeconomic History    Marital status: /Civil Union     Spouse name: Not on file    Number of children: Not on file    Years of education: Not on file    Highest education level: Not on file   Occupational History    Not on file   Social Needs    Financial resource strain: Not on file    Food insecurity:     Worry: Not on file     Inability: Not on file    Transportation needs:     Medical: Not on file     Non-medical: Not on file   Tobacco Use    Smoking status: Current Every Day Smoker    Smokeless tobacco: Never Used   Substance and Sexual Activity    Alcohol use: Yes     Comment: occassional  / social per [employer]    Drug use: No    Sexual activity: Yes     Partners: Male     Birth control/protection: None   Lifestyle    Physical activity:     Days per week: Not on file     Minutes per session: Not on file    Stress: Not on file   Relationships    Social connections:     Talks on phone: Not on file     Gets together: Not on file     Attends Sikhism service: Not on file     Active member of club or organization: Not on file     Attends meetings of clubs or organizations: Not on file     Relationship status: Not on file    Intimate partner violence:     Fear of current or ex partner: Not on file     Emotionally abused: Not on file     Physically abused: Not on file     Forced sexual activity: Not on file   Other Topics Concern    Not on file   Social History Narrative    Not on file     Presently lives with family    Patient is currently employed  At [employer]  Allergies   Allergen Reactions    Lamisil [Terbinafine]     Prednisone      Rapid heart beat and palpitations          Current Outpatient Medications:     famotidine (PEPCID) 20 mg tablet, Take 1 tablet (20 mg total) by mouth 2 (two) times a day, Disp: 60 tablet, Rfl: 5    levothyroxine 200 mcg tablet, TAKE 1 TABLET (200 MCG TOTAL) BY MOUTH DAILY, Disp: 30 tablet, Rfl: 2    levothyroxine 50 mcg tablet, Take 1 tablet (50 mcg total) by mouth daily, Disp: 90 tablet, Rfl: 1    gabapentin (NEURONTIN) 100 mg capsule, Take 1 capsule (100 mg total) by mouth 3 (three) times a day Take 1 tablet at bedtime, Disp: 90 capsule, Rfl: 5      Review of Systems  Constitutional :no fever, feels well, no tiredness, no recent weight gain or loss  ENT: no ear ache, no loss of hearing, no nosebleeds or nasal discharge, no sore throat or hoarseness  Cardiovascular: no complaints of slow or fast heart beat, no chest pain, no palpitations, no leg claudication or lower extremity edema  Respiratory: no complaints of shortness of shortness of breath, no WOMACK  Breasts:no complaints of breast pain, breast lump, or nipple discharge  Gastrointestinal: no complaints of abdominal pain, constipation, nausea, vomiting, or diarrhea or bloody stools  Genitourinary : no complaints of dysuria, incontinence, pelvic pain, no dysmenorrhea, vaginal discharge or abnormal vaginal bleeding and as noted in HPI  Musculoskeletal: no complaints of arthralgia, no myalgia, no joint swelling or stiffness, no limb pain or swelling    Integumentary: no complaints of skin rash or lesion, itching or dry skin  Neurological: no complaints of headache, no confusion, no numbness or tingling, no dizziness or fainting    Physical Exam:     /62   Ht 5' 10" (1 778 m)   Wt 95 7 kg (211 lb)   LMP 11/13/2019   BMI 30 28 kg/m²     General: appears stated age, cooperative, alert normal mood and affect   Psychiatric oriented to person, place and time  Mood and affect normal   Neck: normal, supple,trachea midline, no masses  Thyroid: normal, no thyromegaly   Heart: regular rate and rhythm, S1, S2 normal, no murmur, click, rub or gallop   Lungs: clear to auscultation bilaterally, no increased work of breathing or signs of respiratory distress   Breasts: normal, no dimpling or skin changes noted   Abdomen: soft, non-tender, without masses or organomegaly   Vulva: normal , no lesions   Vagina: normal , no lesions or dryness   Urethra: normal   Urethal meatus normal   Bladder Normal, soft, non-tender and no prolapse or masses appreciated   Cervix: normal, no palpable masses;ec and c pap and HPV culture done    Uterus: normal , non-tender, not enlarged, no palpable masses   Adnexa: normal, non-tender without fullness or masses; hemoccult neg  Lymphatic Palpation of lymph nodes in neck, axilla, groin and/or other locations: no lymphadenopathy or masses noted   Skin Normal skin turgor and no rashes    Palpation of skin and subcutaneous tissue normal

## 2019-11-21 NOTE — PATIENT INSTRUCTIONS
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

## 2019-11-25 ENCOUNTER — TELEPHONE (OUTPATIENT)
Dept: FAMILY MEDICINE CLINIC | Facility: CLINIC | Age: 44
End: 2019-11-25

## 2019-11-25 ENCOUNTER — OFFICE VISIT (OUTPATIENT)
Dept: FAMILY MEDICINE CLINIC | Facility: CLINIC | Age: 44
End: 2019-11-25
Payer: COMMERCIAL

## 2019-11-25 VITALS
SYSTOLIC BLOOD PRESSURE: 120 MMHG | HEIGHT: 67 IN | TEMPERATURE: 98.3 F | BODY MASS INDEX: 25.33 KG/M2 | WEIGHT: 161.4 LBS | HEART RATE: 77 BPM | DIASTOLIC BLOOD PRESSURE: 70 MMHG | OXYGEN SATURATION: 97 %

## 2019-11-25 DIAGNOSIS — K44.9 HIATAL HERNIA: ICD-10-CM

## 2019-11-25 DIAGNOSIS — E89.0 POSTOPERATIVE HYPOTHYROIDISM: Primary | ICD-10-CM

## 2019-11-25 DIAGNOSIS — K21.9 GASTROESOPHAGEAL REFLUX DISEASE WITHOUT ESOPHAGITIS: ICD-10-CM

## 2019-11-25 DIAGNOSIS — M79.602 PAIN OF LEFT UPPER EXTREMITY: ICD-10-CM

## 2019-11-25 LAB
CLINICAL INFO: NORMAL
CYTO CVX: NORMAL
CYTOLOGY CMNT CVX/VAG CYTO-IMP: NORMAL
DATE PREVIOUS BX: NORMAL
HPV E6+E7 MRNA CVX QL NAA+PROBE: NOT DETECTED
LMP START DATE: NORMAL
SL AMB PREV. PAP:: NORMAL
SPECIMEN SOURCE CVX/VAG CYTO: NORMAL

## 2019-11-25 PROCEDURE — 99213 OFFICE O/P EST LOW 20 MIN: CPT | Performed by: FAMILY MEDICINE

## 2019-11-25 RX ORDER — UREA 10 %
1 LOTION (ML) TOPICAL DAILY
Refills: 2 | COMMUNITY
Start: 2019-11-09 | End: 2020-01-06

## 2019-11-25 NOTE — TELEPHONE ENCOUNTER
Pt called stating she has R arm pain and R shoulder tightness and head fogginess; no chest pain or SOB indicated  Symptoms started Friday  As per Dr Du Lawrence pt is to go to Urgent Care   Benjy Godoy Pt states she is going to wait a little and see if it does get better and will decide then

## 2019-11-25 NOTE — PROGRESS NOTES
Assessment/Plan:    10 year cardiac risk is only 1% in this individual   Recommend increase famotidine to b i d     Watch diet  Left upper extremity may be from overuse  Recommend stretching  Prefer nothing more than Tylenol if needed  Due for recheck TSH in about 2-3 weeks  Diagnoses and all orders for this visit:    Postoperative hypothyroidism  -     TSH, 3rd generation; Future    Pain of left upper extremity    Hiatal hernia    Gastroesophageal reflux disease without esophagitis    Other orders  -     Pyridoxine HCl (B-6 PO); Take by mouth daily  -     Cyanocobalamin (B-12 PO); Take by mouth daily  -     FOLIC ACID PO; Take by mouth daily        1  Postoperative hypothyroidism  TSH, 3rd generation   2  Pain of left upper extremity     3  Hiatal hernia     4  Gastroesophageal reflux disease without esophagitis         Subjective:        Patient ID: Lamond Hodgkin is a 39 y o  female  Chief Complaint   Patient presents with    Shoulder Pain     R shoulder pain, pt states that it started friday, pt states that he arm feels really heavy       Patient called this morning with some left-sided arm pain  Having problems with indigestion  Has stopped omeprazole  Total body Burning sensation has pretty much resolved  The following portions of the patient's history were reviewed and updated as appropriate: past medical history, past surgical history and problem list       Review of Systems   Constitutional: Negative for fatigue and fever  HENT: Negative for congestion  Respiratory: Negative for cough and shortness of breath  Cardiovascular: Negative for chest pain  Musculoskeletal:        Left arm feels heavy at times   Psychiatric/Behavioral: The patient is not nervous/anxious            Objective:  /70 (BP Location: Left arm, Patient Position: Sitting, Cuff Size: Standard)   Pulse 77   Temp 98 3 °F (36 8 °C)   Ht 5' 6 5" (1 689 m)   Wt 73 2 kg (161 lb 6 4 oz)   LMP 11/13/2019   SpO2 97%   BMI 25 66 kg/m²        Physical Exam   Constitutional: She appears well-nourished  Cardiovascular: Normal heart sounds  Pulmonary/Chest: Breath sounds normal    Musculoskeletal: She exhibits no edema  Lymphadenopathy:     She has no cervical adenopathy  Neurological: She is alert  Nursing note and vitals reviewed

## 2019-11-29 ENCOUNTER — CLINICAL SUPPORT (OUTPATIENT)
Dept: FAMILY MEDICINE CLINIC | Facility: CLINIC | Age: 44
End: 2019-11-29
Payer: COMMERCIAL

## 2019-11-29 DIAGNOSIS — E53.8 VITAMIN B12 DEFICIENCY: Primary | ICD-10-CM

## 2019-11-29 PROCEDURE — 96372 THER/PROPH/DIAG INJ SC/IM: CPT | Performed by: NURSE PRACTITIONER

## 2019-11-29 RX ORDER — CYANOCOBALAMIN 1000 UG/ML
1000 INJECTION INTRAMUSCULAR; SUBCUTANEOUS
Status: SHIPPED | OUTPATIENT
Start: 2019-11-29

## 2019-11-29 RX ADMIN — CYANOCOBALAMIN 1000 MCG: 1000 INJECTION INTRAMUSCULAR; SUBCUTANEOUS at 15:01

## 2019-12-03 ENCOUNTER — OFFICE VISIT (OUTPATIENT)
Dept: FAMILY MEDICINE CLINIC | Facility: CLINIC | Age: 44
End: 2019-12-03
Payer: COMMERCIAL

## 2019-12-03 VITALS
WEIGHT: 206 LBS | BODY MASS INDEX: 29.49 KG/M2 | SYSTOLIC BLOOD PRESSURE: 110 MMHG | HEART RATE: 81 BPM | TEMPERATURE: 97.9 F | HEIGHT: 70 IN | DIASTOLIC BLOOD PRESSURE: 80 MMHG | RESPIRATION RATE: 16 BRPM | OXYGEN SATURATION: 99 %

## 2019-12-03 DIAGNOSIS — Z72.0 TOBACCO USE: ICD-10-CM

## 2019-12-03 DIAGNOSIS — K21.9 GASTROESOPHAGEAL REFLUX DISEASE WITHOUT ESOPHAGITIS: Primary | ICD-10-CM

## 2019-12-03 DIAGNOSIS — E53.8 B12 DEFICIENCY: ICD-10-CM

## 2019-12-03 DIAGNOSIS — M50.30 DDD (DEGENERATIVE DISC DISEASE), CERVICAL: ICD-10-CM

## 2019-12-03 DIAGNOSIS — R29.898 ARM HEAVINESS: ICD-10-CM

## 2019-12-03 PROCEDURE — 99214 OFFICE O/P EST MOD 30 MIN: CPT | Performed by: FAMILY MEDICINE

## 2019-12-03 PROCEDURE — 3008F BODY MASS INDEX DOCD: CPT | Performed by: FAMILY MEDICINE

## 2019-12-03 RX ORDER — PANTOPRAZOLE SODIUM 40 MG/1
40 TABLET, DELAYED RELEASE ORAL
Qty: 30 TABLET | Refills: 5 | Status: SHIPPED | OUTPATIENT
Start: 2019-12-03 | End: 2020-04-23

## 2019-12-09 PROBLEM — K21.9 GASTROESOPHAGEAL REFLUX DISEASE WITHOUT ESOPHAGITIS: Status: ACTIVE | Noted: 2019-12-09

## 2019-12-09 PROBLEM — Z72.0 TOBACCO USE: Status: ACTIVE | Noted: 2019-12-09

## 2019-12-09 PROBLEM — R29.898 ARM HEAVINESS: Status: ACTIVE | Noted: 2019-12-09

## 2019-12-09 PROBLEM — E53.8 B12 DEFICIENCY: Status: ACTIVE | Noted: 2019-12-09

## 2019-12-09 NOTE — PROGRESS NOTES
Assessment/Plan:    Try changing to pantoprazole  Watch out for burning sensation as she experience with omeprazole  Ordered CT coronary calcium score to assess for heart disease  Patient declines exercise stress test   May consider echocardiogram   B12 shot given today  Left arm symptoms can also possibly be related to neck related issues  If CT calcium score comes back negative may need to consider physical therapy for left arm  Encouraged to reduce smoking  Thyroid labs due  Diagnoses and all orders for this visit:    Gastroesophageal reflux disease without esophagitis  -     pantoprazole (PROTONIX) 40 mg tablet; Take 1 tablet (40 mg total) by mouth daily before breakfast    Arm heaviness  -     CT coronary calcium score; Future    B12 deficiency    Tobacco use    DDD (degenerative disc disease), cervical        1  Gastroesophageal reflux disease without esophagitis  pantoprazole (PROTONIX) 40 mg tablet   2  Arm heaviness  CT coronary calcium score   3  B12 deficiency     4  Tobacco use     5  DDD (degenerative disc disease), cervical         Subjective:        Patient ID: Effie Martin is a 39 y o  female  Chief Complaint   Patient presents with    GERD     Pt is having issues w/ gas and reflux     Arm Problem     Pt also states she feels heaviness in left arm        Patient here with complaints of left arm heaviness  Worried about heart disease  Denies chest pain or shortness of breath  Had stress test and echocardiogram previously ordered but declines doing them because she does not think she can walk on a treadmill      The following portions of the patient's history were reviewed and updated as appropriate: past medical history, past surgical history and problem list       Review of Systems   Constitutional: Negative for appetite change, fatigue, fever and unexpected weight change     HENT: Negative for congestion, ear pain, postnasal drip, rhinorrhea, sinus pressure, sinus pain and sore throat  Eyes: Negative for redness and visual disturbance  Respiratory: Negative for chest tightness and shortness of breath  Cardiovascular: Negative for chest pain, palpitations and leg swelling  Gastrointestinal: Negative for abdominal distention, abdominal pain, diarrhea and nausea  Endocrine: Negative for cold intolerance and heat intolerance  Genitourinary: Negative for dysuria and hematuria  Musculoskeletal: Negative for arthralgias, gait problem and myalgias  Skin: Negative for pallor and rash  Neurological: Negative for dizziness and headaches  Psychiatric/Behavioral: Negative for behavioral problems  The patient is not nervous/anxious  Objective:  /80   Pulse 81   Temp 97 9 °F (36 6 °C) (Temporal)   Resp 16   Ht 5' 10" (1 778 m)   Wt 93 4 kg (206 lb)   LMP 11/13/2019   SpO2 99%   BMI 29 56 kg/m²        Physical Exam   Constitutional: She is oriented to person, place, and time  She appears well-nourished  No distress  HENT:   Head: Normocephalic and atraumatic  Right Ear: Tympanic membrane normal    Left Ear: Tympanic membrane normal    Mouth/Throat: Oropharynx is clear and moist    Eyes: Pupils are equal, round, and reactive to light  Conjunctivae and EOM are normal  No scleral icterus  Neck: Normal range of motion  Neck supple  No thyromegaly present  Cardiovascular: Normal rate, regular rhythm, normal heart sounds and intact distal pulses  No murmur heard  Pulses:       Carotid pulses are 0 on the right side, and 0 on the left side  Pulmonary/Chest: Effort normal and breath sounds normal  No respiratory distress  She has no wheezes  Abdominal: Soft  She exhibits no distension  Musculoskeletal: She exhibits no edema  Lymphadenopathy:     She has no cervical adenopathy  Neurological: She is alert and oriented to person, place, and time  She has normal reflexes  No cranial nerve deficit  Skin: Skin is warm  No pallor     Psychiatric: She has a normal mood and affect  Her behavior is normal  Judgment and thought content normal    Nursing note and vitals reviewed

## 2019-12-27 ENCOUNTER — TELEPHONE (OUTPATIENT)
Dept: FAMILY MEDICINE CLINIC | Facility: CLINIC | Age: 44
End: 2019-12-27

## 2019-12-27 NOTE — TELEPHONE ENCOUNTER
Patient is scheduled for her B12 on Monday at 4  She would like to speak to you for a few minutes  She would like an extension on her FMLA      [phone]

## 2019-12-27 NOTE — TELEPHONE ENCOUNTER
All be happy to extend her FMLA if she would like  Might need to be officially seen by me on Monday for this to count ? ?

## 2019-12-30 ENCOUNTER — OFFICE VISIT (OUTPATIENT)
Dept: FAMILY MEDICINE CLINIC | Facility: CLINIC | Age: 44
End: 2019-12-30
Payer: COMMERCIAL

## 2019-12-30 VITALS
HEIGHT: 71 IN | SYSTOLIC BLOOD PRESSURE: 112 MMHG | BODY MASS INDEX: 29.51 KG/M2 | DIASTOLIC BLOOD PRESSURE: 60 MMHG | HEART RATE: 75 BPM | OXYGEN SATURATION: 98 % | WEIGHT: 210.8 LBS | TEMPERATURE: 97.8 F

## 2019-12-30 DIAGNOSIS — R20.2 PARESTHESIA OF LEFT LOWER EXTREMITY: Primary | ICD-10-CM

## 2019-12-30 DIAGNOSIS — E03.9 HYPOTHYROIDISM, UNSPECIFIED TYPE: ICD-10-CM

## 2019-12-30 DIAGNOSIS — E53.8 B12 DEFICIENCY: ICD-10-CM

## 2019-12-30 DIAGNOSIS — K21.9 GASTROESOPHAGEAL REFLUX DISEASE WITHOUT ESOPHAGITIS: ICD-10-CM

## 2019-12-30 PROCEDURE — 99214 OFFICE O/P EST MOD 30 MIN: CPT | Performed by: FAMILY MEDICINE

## 2019-12-30 PROCEDURE — 96372 THER/PROPH/DIAG INJ SC/IM: CPT | Performed by: FAMILY MEDICINE

## 2019-12-30 RX ADMIN — CYANOCOBALAMIN 1000 MCG: 1000 INJECTION INTRAMUSCULAR; SUBCUTANEOUS at 16:39

## 2020-01-01 NOTE — PROGRESS NOTES
Assessment/Plan:       Diagnoses and all orders for this visit:    Paresthesia of left lower extremity  -     Ambulatory referral to Neurology; Future    B12 deficiency  -     Vitamin B12; Future    Hypothyroidism, unspecified type  -     TSH, 3rd generation; Future    Gastroesophageal reflux disease without esophagitis        1  Paresthesia of left lower extremity  Ambulatory referral to Neurology   2  B12 deficiency  Vitamin B12   3  Hypothyroidism, unspecified type  TSH, 3rd generation   4  Gastroesophageal reflux disease without esophagitis         Subjective:        Patient ID: Lisa Joyner is a 38 y o  female  Chief Complaint   Patient presents with    Follow-up     FMLA paper work and B12 injection;       HPI    The following portions of the patient's history were reviewed and updated as appropriate: past medical history, past surgical history and problem list       Review of Systems   Constitutional: Negative for appetite change, fatigue, fever and unexpected weight change  HENT: Negative for congestion, ear pain, postnasal drip, rhinorrhea, sinus pressure, sinus pain and sore throat  Eyes: Negative for redness and visual disturbance  Respiratory: Negative for chest tightness and shortness of breath  Cardiovascular: Negative for chest pain, palpitations and leg swelling  Gastrointestinal: Negative for abdominal distention, abdominal pain, diarrhea and nausea  Endocrine: Negative for cold intolerance and heat intolerance  Genitourinary: Negative for dysuria and hematuria  Musculoskeletal: Negative for arthralgias, gait problem and myalgias  Skin: Negative for pallor and rash  Neurological: Negative for dizziness and headaches  Psychiatric/Behavioral: Negative for behavioral problems  The patient is not nervous/anxious            Objective:  /60 (BP Location: Left arm, Patient Position: Sitting, Cuff Size: Standard)   Pulse 75   Temp 97 8 °F (36 6 °C)   Ht 5' 10 5" (1 791 m)   Wt 95 6 kg (210 lb 12 8 oz)   SpO2 98%   BMI 29 82 kg/m²        Physical Exam   Constitutional: She is oriented to person, place, and time  She appears well-nourished  No distress  HENT:   Head: Normocephalic and atraumatic  Right Ear: Tympanic membrane normal    Left Ear: Tympanic membrane normal    Mouth/Throat: Oropharynx is clear and moist    Eyes: Pupils are equal, round, and reactive to light  Conjunctivae and EOM are normal  No scleral icterus  Neck: Normal range of motion  Neck supple  No thyromegaly present  Cardiovascular: Normal rate, regular rhythm, normal heart sounds and intact distal pulses  No murmur heard  Pulses:       Carotid pulses are 0 on the right side, and 0 on the left side  Pulmonary/Chest: Effort normal and breath sounds normal  No respiratory distress  She has no wheezes  Abdominal: Soft  She exhibits no distension  Musculoskeletal: She exhibits no edema  Lymphadenopathy:     She has no cervical adenopathy  Neurological: She is alert and oriented to person, place, and time  She has normal reflexes  No cranial nerve deficit  Skin: Skin is warm  No pallor  Psychiatric: She has a normal mood and affect  Her behavior is normal  Judgment and thought content normal    Nursing note and vitals reviewed

## 2020-01-05 DIAGNOSIS — E51.9 VITAMIN B1 DEFICIENCY: ICD-10-CM

## 2020-01-05 DIAGNOSIS — E53.8 B12 DEFICIENCY: Primary | ICD-10-CM

## 2020-01-06 RX ORDER — THIAMINE HCL 50 MG
TABLET ORAL
Qty: 30 TABLET | Refills: 0 | Status: SHIPPED | OUTPATIENT
Start: 2020-01-06 | End: 2020-06-16

## 2020-02-19 ENCOUNTER — DOCUMENTATION (OUTPATIENT)
Dept: OBGYN CLINIC | Facility: MEDICAL CENTER | Age: 45
End: 2020-02-19

## 2020-02-26 ENCOUNTER — TELEPHONE (OUTPATIENT)
Dept: FAMILY MEDICINE CLINIC | Facility: CLINIC | Age: 45
End: 2020-02-26

## 2020-02-26 DIAGNOSIS — J32.9 SINUSITIS, UNSPECIFIED CHRONICITY, UNSPECIFIED LOCATION: Primary | ICD-10-CM

## 2020-02-26 RX ORDER — AZITHROMYCIN 250 MG/1
TABLET, FILM COATED ORAL
Qty: 6 TABLET | Refills: 0 | Status: SHIPPED | OUTPATIENT
Start: 2020-02-26 | End: 2020-03-02

## 2020-02-26 NOTE — TELEPHONE ENCOUNTER
[facility] for shot tomorrow and I called in [employer] for her  Rest and fluids, call if worse

## 2020-02-26 NOTE — TELEPHONE ENCOUNTER
Patient last had her B12 on 12/30/19  She forgot to come in in January 2020  Can she come in tomorrow for the injection? ALSO, she is pretty sure that she is starting with a sinus infection  She has pressure in her ears, head, and pain in her neck    Can an antibiotic be sent to [address] [place] [address]

## 2020-02-27 ENCOUNTER — CLINICAL SUPPORT (OUTPATIENT)
Dept: FAMILY MEDICINE CLINIC | Facility: CLINIC | Age: 45
End: 2020-02-27
Payer: COMMERCIAL

## 2020-02-27 DIAGNOSIS — E53.8 B12 DEFICIENCY: Primary | ICD-10-CM

## 2020-02-27 PROCEDURE — 96372 THER/PROPH/DIAG INJ SC/IM: CPT | Performed by: NURSE PRACTITIONER

## 2020-02-27 RX ADMIN — CYANOCOBALAMIN 1000 MCG: 1000 INJECTION INTRAMUSCULAR; SUBCUTANEOUS at 16:48

## 2020-03-26 ENCOUNTER — CLINICAL SUPPORT (OUTPATIENT)
Dept: FAMILY MEDICINE CLINIC | Facility: CLINIC | Age: 45
End: 2020-03-26
Payer: COMMERCIAL

## 2020-03-26 DIAGNOSIS — E53.8 B12 DEFICIENCY: ICD-10-CM

## 2020-03-26 PROCEDURE — 96372 THER/PROPH/DIAG INJ SC/IM: CPT | Performed by: FAMILY MEDICINE

## 2020-03-26 RX ADMIN — CYANOCOBALAMIN 1000 MCG: 1000 INJECTION INTRAMUSCULAR; SUBCUTANEOUS at 15:30

## 2020-04-03 ENCOUNTER — TELEMEDICINE (OUTPATIENT)
Dept: FAMILY MEDICINE CLINIC | Facility: CLINIC | Age: 45
End: 2020-04-03
Payer: COMMERCIAL

## 2020-04-03 DIAGNOSIS — J02.9 SORE THROAT: Primary | ICD-10-CM

## 2020-04-03 PROCEDURE — 99214 OFFICE O/P EST MOD 30 MIN: CPT | Performed by: FAMILY MEDICINE

## 2020-04-03 RX ORDER — AMOXICILLIN 875 MG/1
875 TABLET, COATED ORAL 2 TIMES DAILY
Qty: 14 TABLET | Refills: 0 | Status: SHIPPED | OUTPATIENT
Start: 2020-04-03 | End: 2020-04-10

## 2020-04-23 ENCOUNTER — TELEMEDICINE (OUTPATIENT)
Dept: FAMILY MEDICINE CLINIC | Facility: CLINIC | Age: 45
End: 2020-04-23
Payer: COMMERCIAL

## 2020-04-23 DIAGNOSIS — E53.8 B12 DEFICIENCY: ICD-10-CM

## 2020-04-23 DIAGNOSIS — R20.2 PARESTHESIA OF BILATERAL LEGS: ICD-10-CM

## 2020-04-23 DIAGNOSIS — E53.1 VITAMIN B6 DEFICIENCY: ICD-10-CM

## 2020-04-23 DIAGNOSIS — E53.8 FOLATE DEFICIENCY: ICD-10-CM

## 2020-04-23 DIAGNOSIS — R20.2 PARESTHESIA: Primary | ICD-10-CM

## 2020-04-23 DIAGNOSIS — R20.2 PARESTHESIA OF FOOT, BILATERAL: ICD-10-CM

## 2020-04-23 PROCEDURE — 99214 OFFICE O/P EST MOD 30 MIN: CPT | Performed by: FAMILY MEDICINE

## 2020-04-24 ENCOUNTER — TELEPHONE (OUTPATIENT)
Dept: FAMILY MEDICINE CLINIC | Facility: CLINIC | Age: 45
End: 2020-04-24

## 2020-04-27 ENCOUNTER — TELEPHONE (OUTPATIENT)
Dept: FAMILY MEDICINE CLINIC | Facility: CLINIC | Age: 45
End: 2020-04-27

## 2020-04-27 ENCOUNTER — CLINICAL SUPPORT (OUTPATIENT)
Dept: FAMILY MEDICINE CLINIC | Facility: CLINIC | Age: 45
End: 2020-04-27
Payer: COMMERCIAL

## 2020-04-27 DIAGNOSIS — E53.8 B12 DEFICIENCY: ICD-10-CM

## 2020-04-27 PROCEDURE — 96372 THER/PROPH/DIAG INJ SC/IM: CPT

## 2020-04-27 RX ADMIN — CYANOCOBALAMIN 1000 MCG: 1000 INJECTION INTRAMUSCULAR; SUBCUTANEOUS at 16:05

## 2020-04-30 ENCOUNTER — TELEPHONE (OUTPATIENT)
Dept: FAMILY MEDICINE CLINIC | Facility: CLINIC | Age: 45
End: 2020-04-30

## 2020-04-30 DIAGNOSIS — J01.90 ACUTE NON-RECURRENT SINUSITIS, UNSPECIFIED LOCATION: Primary | ICD-10-CM

## 2020-04-30 RX ORDER — AZITHROMYCIN 250 MG/1
TABLET, FILM COATED ORAL
Qty: 6 TABLET | Refills: 0 | Status: SHIPPED | OUTPATIENT
Start: 2020-04-30 | End: 2020-05-05

## 2020-05-01 ENCOUNTER — TELEPHONE (OUTPATIENT)
Dept: NEUROLOGY | Facility: CLINIC | Age: 45
End: 2020-05-01

## 2020-05-05 ENCOUNTER — TELEPHONE (OUTPATIENT)
Dept: NEUROLOGY | Facility: CLINIC | Age: 45
End: 2020-05-05

## 2020-05-06 ENCOUNTER — TELEPHONE (OUTPATIENT)
Dept: NEUROLOGY | Facility: CLINIC | Age: 45
End: 2020-05-06

## 2020-05-07 ENCOUNTER — TELEMEDICINE (OUTPATIENT)
Dept: NEUROLOGY | Facility: CLINIC | Age: 45
End: 2020-05-07
Payer: COMMERCIAL

## 2020-05-07 DIAGNOSIS — R20.2 PARESTHESIA OF BILATERAL LEGS: ICD-10-CM

## 2020-05-07 DIAGNOSIS — R20.2 PARESTHESIA OF FOOT, BILATERAL: ICD-10-CM

## 2020-05-07 DIAGNOSIS — G60.8 IDIOPATHIC SMALL FIBER SENSORY NEUROPATHY: Primary | ICD-10-CM

## 2020-05-07 DIAGNOSIS — M54.16 LUMBAR RADICULOPATHY: ICD-10-CM

## 2020-05-07 DIAGNOSIS — E53.8 B12 DEFICIENCY: ICD-10-CM

## 2020-05-07 DIAGNOSIS — M54.12 CERVICAL RADICULOPATHY: ICD-10-CM

## 2020-05-07 DIAGNOSIS — E06.3 CHRONIC LYMPHOCYTIC THYROIDITIS: ICD-10-CM

## 2020-05-07 PROCEDURE — 99243 OFF/OP CNSLTJ NEW/EST LOW 30: CPT | Performed by: PSYCHIATRY & NEUROLOGY

## 2020-05-27 ENCOUNTER — CLINICAL SUPPORT (OUTPATIENT)
Dept: FAMILY MEDICINE CLINIC | Facility: CLINIC | Age: 45
End: 2020-05-27
Payer: COMMERCIAL

## 2020-05-27 DIAGNOSIS — E53.8 VITAMIN B 12 DEFICIENCY: Primary | ICD-10-CM

## 2020-05-27 PROCEDURE — 96372 THER/PROPH/DIAG INJ SC/IM: CPT

## 2020-05-27 RX ADMIN — CYANOCOBALAMIN 1000 MCG: 1000 INJECTION INTRAMUSCULAR; SUBCUTANEOUS at 16:08

## 2020-06-03 DIAGNOSIS — E03.9 HYPOTHYROIDISM, UNSPECIFIED TYPE: ICD-10-CM

## 2020-06-03 RX ORDER — LEVOTHYROXINE SODIUM 0.05 MG/1
TABLET ORAL
Qty: 90 TABLET | Refills: 1 | Status: SHIPPED | OUTPATIENT
Start: 2020-06-03 | End: 2020-11-27

## 2020-06-16 DIAGNOSIS — E03.9 HYPOTHYROIDISM, UNSPECIFIED TYPE: ICD-10-CM

## 2020-06-16 RX ORDER — LEVOTHYROXINE SODIUM 0.2 MG/1
200 TABLET ORAL DAILY
Qty: 90 TABLET | Refills: 2 | Status: SHIPPED | OUTPATIENT
Start: 2020-06-16 | End: 2021-03-24 | Stop reason: SDUPTHER

## 2020-06-24 ENCOUNTER — CLINICAL SUPPORT (OUTPATIENT)
Dept: FAMILY MEDICINE CLINIC | Facility: CLINIC | Age: 45
End: 2020-06-24
Payer: COMMERCIAL

## 2020-06-24 DIAGNOSIS — E53.8 VITAMIN B 12 DEFICIENCY: Primary | ICD-10-CM

## 2020-06-24 PROCEDURE — 96372 THER/PROPH/DIAG INJ SC/IM: CPT | Performed by: FAMILY MEDICINE

## 2020-06-24 RX ADMIN — CYANOCOBALAMIN 1000 MCG: 1000 INJECTION INTRAMUSCULAR; SUBCUTANEOUS at 16:16

## 2020-07-28 RX ORDER — IBUPROFEN 600 MG/1
TABLET ORAL
COMMUNITY
Start: 2020-05-15 | End: 2020-08-07

## 2020-07-28 RX ORDER — OXYCODONE HYDROCHLORIDE AND ACETAMINOPHEN 5; 325 MG/1; MG/1
1 TABLET ORAL EVERY 6 HOURS PRN
COMMUNITY
Start: 2020-05-15 | End: 2020-08-07

## 2020-07-28 RX ORDER — CHLORHEXIDINE GLUCONATE 0.12 MG/ML
RINSE ORAL
COMMUNITY
Start: 2020-05-15 | End: 2020-08-07

## 2020-08-07 ENCOUNTER — OFFICE VISIT (OUTPATIENT)
Dept: FAMILY MEDICINE CLINIC | Facility: CLINIC | Age: 45
End: 2020-08-07
Payer: COMMERCIAL

## 2020-08-07 VITALS
HEIGHT: 71 IN | WEIGHT: 222 LBS | DIASTOLIC BLOOD PRESSURE: 60 MMHG | RESPIRATION RATE: 16 BRPM | TEMPERATURE: 98 F | OXYGEN SATURATION: 99 % | HEART RATE: 66 BPM | SYSTOLIC BLOOD PRESSURE: 102 MMHG | BODY MASS INDEX: 31.08 KG/M2

## 2020-08-07 DIAGNOSIS — L03.012 PARONYCHIA OF LEFT THUMB: Primary | ICD-10-CM

## 2020-08-07 DIAGNOSIS — E53.8 B12 DEFICIENCY: ICD-10-CM

## 2020-08-07 DIAGNOSIS — T14.8XXA SPLINTER IN SKIN: ICD-10-CM

## 2020-08-07 PROCEDURE — 99213 OFFICE O/P EST LOW 20 MIN: CPT | Performed by: FAMILY MEDICINE

## 2020-08-07 PROCEDURE — 96372 THER/PROPH/DIAG INJ SC/IM: CPT | Performed by: FAMILY MEDICINE

## 2020-08-07 PROCEDURE — 3008F BODY MASS INDEX DOCD: CPT | Performed by: FAMILY MEDICINE

## 2020-08-07 RX ORDER — CEPHALEXIN 500 MG/1
500 CAPSULE ORAL EVERY 12 HOURS SCHEDULED
Qty: 20 CAPSULE | Refills: 0 | Status: SHIPPED | OUTPATIENT
Start: 2020-08-07 | End: 2020-08-17

## 2020-08-07 RX ADMIN — CYANOCOBALAMIN 1000 MCG: 1000 INJECTION INTRAMUSCULAR; SUBCUTANEOUS at 15:43

## 2020-08-07 NOTE — PATIENT INSTRUCTIONS
Here for left thumb paronychia and also possible wound from splinter  Will rec abx for 10 days and if not better rec xray right hand for wound and to r/o foreign body right ring finger under nail  May use triple abx to left thumb and right index finger  Consider [facility] hand center if not better  Keep areas clean with soap and water and may use triple abx as directed prn wound in right ring finger and left thumb paronychia  The right ring finger wound occurred last Sunday evening  B12 shot today also  If not better [facility] xray  Pt  States it felt better after trimming nail as some puss came out after trimming nail down  Pressure at tip of nail improved  Tetanus is UTD

## 2020-08-07 NOTE — PROGRESS NOTES
Assessment/Plan:  Chief Complaint   Patient presents with    Foreign Body in Skin     Pt is here w/ c/o possible splinter under her right ring finger nail and has hangnail on left thumb nail     Injections     B12      Patient Instructions   Here for left thumb paronychia and also possible wound from splinter  Will rec abx for 10 days and if not better rec xray right hand for wound and to r/o foreign body right ring finger under nail  May use triple abx to left thumb and right index finger  Consider [facility] hand center if not better  Keep areas clean with soap and water and may use triple abx as directed prn wound in right ring finger and left thumb paronychia  The right ring finger wound occurred last Sunday evening  B12 shot today also  If not better [facility] xray  Pt  States it felt better after trimming nail as some puss came out after trimming nail down  Pressure at tip of nail improved  Tetanus is UTD  No problem-specific Assessment & Plan notes found for this encounter  Diagnoses and all orders for this visit:    Paronychia of left thumb  -     cephalexin (KEFLEX) 500 mg capsule; Take 1 capsule (500 mg total) by mouth every 12 (twelve) hours for 10 days  -     XR hand 3+ vw right; Future    Splinter in skin  -     XR hand 3+ vw right; Future          Subjective:      Patient ID: Juanita Patricia is a 38 y o  female  Here for possible splinter under right wring finger and paronychia left thumb  Pain at tip and no foreign body under nail  No bleeding  Tetanus shot UTD  Foreign Body in Skin         The following portions of the patient's history were reviewed and updated as appropriate: allergies, current medications, past family history, past medical history, past social history, past surgical history and problem list     Review of Systems   Constitutional: Negative  HENT: Negative  Eyes: Negative  Respiratory: Negative  Cardiovascular: Negative  Gastrointestinal: Negative  Endocrine: Negative  Genitourinary: Negative  Musculoskeletal: Negative  Skin:        Left thumb paronychia and right ring finger possile splinter or wound from splinter at tip   Allergic/Immunologic: Negative  Neurological: Negative  Hematological: Negative  Psychiatric/Behavioral: Negative  Objective:      /60   Pulse 66   Temp 98 °F (36 7 °C) (Temporal)   Resp 16   Ht 5' 10 5" (1 791 m)   Wt 101 kg (222 lb)   SpO2 99%   BMI 31 40 kg/m²          Physical Exam  Constitutional:       Appearance: She is well-developed  HENT:      Head: Normocephalic and atraumatic  Right Ear: External ear normal       Left Ear: External ear normal       Nose: Nose normal    Eyes:      Conjunctiva/sclera: Conjunctivae normal       Pupils: Pupils are equal, round, and reactive to light  Neck:      Musculoskeletal: Normal range of motion and neck supple  Cardiovascular:      Rate and Rhythm: Normal rate and regular rhythm  Heart sounds: Normal heart sounds  Pulmonary:      Effort: Pulmonary effort is normal       Breath sounds: Normal breath sounds  Musculoskeletal: Normal range of motion  Comments: Left thumb paronychia and right ring finger wound from possible splinter under nail, used proper sterile technique and no obvious splinter seen under finger nail, possible wound under nail and tip of finger from a splinter  Trimmed nail right ring finger with nail ynes and no splinter seen on exam under nail, used proper sterile technique  Skin:     General: Skin is warm and dry  Neurological:      Mental Status: She is alert and oriented to person, place, and time  Deep Tendon Reflexes: Reflexes are normal and symmetric     Psychiatric:         Behavior: Behavior normal

## 2020-09-04 ENCOUNTER — APPOINTMENT (OUTPATIENT)
Dept: LAB | Facility: CLINIC | Age: 45
End: 2020-09-04
Payer: COMMERCIAL

## 2020-09-04 ENCOUNTER — CLINICAL SUPPORT (OUTPATIENT)
Dept: FAMILY MEDICINE CLINIC | Facility: CLINIC | Age: 45
End: 2020-09-04
Payer: COMMERCIAL

## 2020-09-04 DIAGNOSIS — E53.8 FOLATE DEFICIENCY: ICD-10-CM

## 2020-09-04 DIAGNOSIS — E53.1 VITAMIN B6 DEFICIENCY: ICD-10-CM

## 2020-09-04 DIAGNOSIS — E89.0 POSTOPERATIVE HYPOTHYROIDISM: ICD-10-CM

## 2020-09-04 DIAGNOSIS — E03.9 HYPOTHYROIDISM, UNSPECIFIED TYPE: ICD-10-CM

## 2020-09-04 DIAGNOSIS — E53.8 B12 DEFICIENCY: ICD-10-CM

## 2020-09-04 DIAGNOSIS — E53.8 B12 DEFICIENCY: Primary | ICD-10-CM

## 2020-09-04 DIAGNOSIS — R20.2 PARESTHESIA OF BILATERAL LEGS: ICD-10-CM

## 2020-09-04 LAB
BUN SERPL-MCNC: 7 MG/DL (ref 5–25)
CREAT SERPL-MCNC: 0.9 MG/DL (ref 0.6–1.3)
FOLATE SERPL-MCNC: 3 NG/ML (ref 3.1–17.5)
GFR SERPL CREATININE-BSD FRML MDRD: 77 ML/MIN/1.73SQ M
TSH SERPL DL<=0.05 MIU/L-ACNC: 1.08 UIU/ML (ref 0.36–3.74)
VIT B12 SERPL-MCNC: 405 PG/ML (ref 100–900)

## 2020-09-04 PROCEDURE — 84443 ASSAY THYROID STIM HORMONE: CPT

## 2020-09-04 PROCEDURE — 82565 ASSAY OF CREATININE: CPT

## 2020-09-04 PROCEDURE — 82607 VITAMIN B-12: CPT

## 2020-09-04 PROCEDURE — 84520 ASSAY OF UREA NITROGEN: CPT

## 2020-09-04 PROCEDURE — 84207 ASSAY OF VITAMIN B-6: CPT

## 2020-09-04 PROCEDURE — 36415 COLL VENOUS BLD VENIPUNCTURE: CPT

## 2020-09-04 PROCEDURE — 96372 THER/PROPH/DIAG INJ SC/IM: CPT | Performed by: FAMILY MEDICINE

## 2020-09-04 PROCEDURE — 82746 ASSAY OF FOLIC ACID SERUM: CPT

## 2020-09-04 RX ADMIN — CYANOCOBALAMIN 1000 MCG: 1000 INJECTION INTRAMUSCULAR; SUBCUTANEOUS at 13:17

## 2020-09-09 RX ORDER — CYANOCOBALAMIN 1000 UG/ML
1000 INJECTION INTRAMUSCULAR; SUBCUTANEOUS
Status: SHIPPED | OUTPATIENT
Start: 2020-09-09

## 2020-09-10 LAB — VIT B6 SERPL-MCNC: 6 UG/L (ref 2–32.8)

## 2020-09-14 ENCOUNTER — TELEPHONE (OUTPATIENT)
Dept: FAMILY MEDICINE CLINIC | Facility: CLINIC | Age: 45
End: 2020-09-14

## 2020-09-17 DIAGNOSIS — R20.2 PARESTHESIA: Primary | ICD-10-CM

## 2020-09-17 RX ORDER — GABAPENTIN 100 MG/1
CAPSULE ORAL
Qty: 60 CAPSULE | Refills: 2 | Status: SHIPPED | OUTPATIENT
Start: 2020-09-17 | End: 2021-02-06 | Stop reason: SDUPTHER

## 2020-10-01 ENCOUNTER — CLINICAL SUPPORT (OUTPATIENT)
Dept: FAMILY MEDICINE CLINIC | Facility: CLINIC | Age: 45
End: 2020-10-01
Payer: COMMERCIAL

## 2020-10-01 ENCOUNTER — TELEPHONE (OUTPATIENT)
Dept: FAMILY MEDICINE CLINIC | Facility: CLINIC | Age: 45
End: 2020-10-01

## 2020-10-01 DIAGNOSIS — E53.8 B12 DEFICIENCY: ICD-10-CM

## 2020-10-01 PROCEDURE — 96372 THER/PROPH/DIAG INJ SC/IM: CPT

## 2020-10-01 RX ADMIN — CYANOCOBALAMIN 1000 MCG: 1000 INJECTION INTRAMUSCULAR; SUBCUTANEOUS at 16:51

## 2020-10-20 ENCOUNTER — TELEPHONE (OUTPATIENT)
Dept: NEUROLOGY | Facility: CLINIC | Age: 45
End: 2020-10-20

## 2020-10-20 ENCOUNTER — OFFICE VISIT (OUTPATIENT)
Dept: FAMILY MEDICINE CLINIC | Facility: CLINIC | Age: 45
End: 2020-10-20
Payer: COMMERCIAL

## 2020-10-20 VITALS
HEIGHT: 70 IN | WEIGHT: 218.8 LBS | OXYGEN SATURATION: 99 % | HEART RATE: 75 BPM | BODY MASS INDEX: 31.32 KG/M2 | SYSTOLIC BLOOD PRESSURE: 110 MMHG | TEMPERATURE: 97.4 F | DIASTOLIC BLOOD PRESSURE: 64 MMHG

## 2020-10-20 DIAGNOSIS — Z12.31 ENCOUNTER FOR SCREENING MAMMOGRAM FOR MALIGNANT NEOPLASM OF BREAST: ICD-10-CM

## 2020-10-20 DIAGNOSIS — Z00.00 HEALTHCARE MAINTENANCE: ICD-10-CM

## 2020-10-20 DIAGNOSIS — E53.8 VITAMIN B 12 DEFICIENCY: ICD-10-CM

## 2020-10-20 DIAGNOSIS — G60.8 IDIOPATHIC SMALL FIBER SENSORY NEUROPATHY: ICD-10-CM

## 2020-10-20 DIAGNOSIS — R20.2 PARESTHESIA OF FOOT, BILATERAL: ICD-10-CM

## 2020-10-20 DIAGNOSIS — E53.8 FOLATE DEFICIENCY: ICD-10-CM

## 2020-10-20 DIAGNOSIS — E89.0 POSTOPERATIVE HYPOTHYROIDISM: Primary | ICD-10-CM

## 2020-10-20 DIAGNOSIS — E53.1 VITAMIN B6 DEFICIENCY: ICD-10-CM

## 2020-10-20 DIAGNOSIS — E66.09 CLASS 1 OBESITY DUE TO EXCESS CALORIES WITHOUT SERIOUS COMORBIDITY WITH BODY MASS INDEX (BMI) OF 32.0 TO 32.9 IN ADULT: ICD-10-CM

## 2020-10-20 DIAGNOSIS — R20.2 PARESTHESIA OF BILATERAL LEGS: Primary | ICD-10-CM

## 2020-10-20 PROCEDURE — 3725F SCREEN DEPRESSION PERFORMED: CPT | Performed by: FAMILY MEDICINE

## 2020-10-20 PROCEDURE — 99214 OFFICE O/P EST MOD 30 MIN: CPT | Performed by: FAMILY MEDICINE

## 2020-10-20 PROCEDURE — 4004F PT TOBACCO SCREEN RCVD TLK: CPT | Performed by: FAMILY MEDICINE

## 2020-10-20 RX ORDER — GABAPENTIN 100 MG/1
CAPSULE ORAL
COMMUNITY
End: 2020-11-30 | Stop reason: SDUPTHER

## 2020-10-29 ENCOUNTER — CLINICAL SUPPORT (OUTPATIENT)
Dept: FAMILY MEDICINE CLINIC | Facility: CLINIC | Age: 45
End: 2020-10-29
Payer: COMMERCIAL

## 2020-10-29 DIAGNOSIS — E53.8 B12 DEFICIENCY: Primary | ICD-10-CM

## 2020-10-29 PROCEDURE — 96372 THER/PROPH/DIAG INJ SC/IM: CPT

## 2020-10-29 RX ADMIN — CYANOCOBALAMIN 1000 MCG: 1000 INJECTION INTRAMUSCULAR; SUBCUTANEOUS at 16:46

## 2020-11-19 ENCOUNTER — TELEMEDICINE (OUTPATIENT)
Dept: FAMILY MEDICINE CLINIC | Facility: CLINIC | Age: 45
End: 2020-11-19
Payer: COMMERCIAL

## 2020-11-19 DIAGNOSIS — K21.9 GASTROESOPHAGEAL REFLUX DISEASE WITHOUT ESOPHAGITIS: ICD-10-CM

## 2020-11-19 DIAGNOSIS — R10.13 EPIGASTRIC PAIN: Primary | ICD-10-CM

## 2020-11-19 PROCEDURE — 4004F PT TOBACCO SCREEN RCVD TLK: CPT | Performed by: NURSE PRACTITIONER

## 2020-11-19 PROCEDURE — 99213 OFFICE O/P EST LOW 20 MIN: CPT | Performed by: NURSE PRACTITIONER

## 2020-11-27 DIAGNOSIS — E03.9 HYPOTHYROIDISM, UNSPECIFIED TYPE: ICD-10-CM

## 2020-11-27 RX ORDER — LEVOTHYROXINE SODIUM 0.05 MG/1
TABLET ORAL
Qty: 90 TABLET | Refills: 1 | Status: SHIPPED | OUTPATIENT
Start: 2020-11-27 | End: 2021-03-24 | Stop reason: SDUPTHER

## 2020-11-30 ENCOUNTER — CLINICAL SUPPORT (OUTPATIENT)
Dept: FAMILY MEDICINE CLINIC | Facility: CLINIC | Age: 45
End: 2020-11-30
Payer: COMMERCIAL

## 2020-11-30 ENCOUNTER — ANNUAL EXAM (OUTPATIENT)
Dept: OBGYN CLINIC | Facility: MEDICAL CENTER | Age: 45
End: 2020-11-30
Payer: COMMERCIAL

## 2020-11-30 VITALS
SYSTOLIC BLOOD PRESSURE: 120 MMHG | HEIGHT: 70 IN | BODY MASS INDEX: 31.78 KG/M2 | DIASTOLIC BLOOD PRESSURE: 70 MMHG | WEIGHT: 222 LBS

## 2020-11-30 DIAGNOSIS — E53.8 B12 DEFICIENCY: ICD-10-CM

## 2020-11-30 DIAGNOSIS — Z01.419 ENCNTR FOR GYN EXAM (GENERAL) (ROUTINE) W/O ABN FINDINGS: Primary | ICD-10-CM

## 2020-11-30 PROCEDURE — 96372 THER/PROPH/DIAG INJ SC/IM: CPT

## 2020-11-30 PROCEDURE — S0612 ANNUAL GYNECOLOGICAL EXAMINA: HCPCS | Performed by: NURSE PRACTITIONER

## 2020-11-30 PROCEDURE — 3008F BODY MASS INDEX DOCD: CPT | Performed by: NURSE PRACTITIONER

## 2020-11-30 RX ADMIN — CYANOCOBALAMIN 1000 MCG: 1000 INJECTION INTRAMUSCULAR; SUBCUTANEOUS at 15:53

## 2020-12-10 ENCOUNTER — TELEPHONE (OUTPATIENT)
Dept: FAMILY MEDICINE CLINIC | Facility: CLINIC | Age: 45
End: 2020-12-10

## 2020-12-10 NOTE — TELEPHONE ENCOUNTER
Complaining of a sinus infection, feels pressure into her eyes and into jaw, right ear bothering her  Denies cough, congestion, runny nose, sore throat, fever  Denies loss of taste or loss of smell    Asking if a prescription can be sent to her pharmacy - [facility]

## 2020-12-11 DIAGNOSIS — J01.91 ACUTE RECURRENT SINUSITIS, UNSPECIFIED LOCATION: Primary | ICD-10-CM

## 2020-12-11 RX ORDER — AMOXICILLIN AND CLAVULANATE POTASSIUM 875; 125 MG/1; MG/1
1 TABLET, FILM COATED ORAL EVERY 12 HOURS SCHEDULED
Qty: 14 TABLET | Refills: 0 | Status: SHIPPED | OUTPATIENT
Start: 2020-12-11 | End: 2020-12-15

## 2020-12-30 ENCOUNTER — CLINICAL SUPPORT (OUTPATIENT)
Dept: FAMILY MEDICINE CLINIC | Facility: CLINIC | Age: 45
End: 2020-12-30
Payer: COMMERCIAL

## 2020-12-30 DIAGNOSIS — E53.8 VITAMIN B 12 DEFICIENCY: Primary | ICD-10-CM

## 2020-12-30 PROCEDURE — 96372 THER/PROPH/DIAG INJ SC/IM: CPT

## 2020-12-30 RX ADMIN — CYANOCOBALAMIN 1000 MCG: 1000 INJECTION INTRAMUSCULAR; SUBCUTANEOUS at 16:12

## 2021-01-21 ENCOUNTER — TELEMEDICINE (OUTPATIENT)
Dept: FAMILY MEDICINE CLINIC | Facility: CLINIC | Age: 46
End: 2021-01-21
Payer: COMMERCIAL

## 2021-01-21 DIAGNOSIS — B34.9 VIRAL INFECTION, UNSPECIFIED: Primary | ICD-10-CM

## 2021-01-21 DIAGNOSIS — B34.9 VIRAL INFECTION, UNSPECIFIED: ICD-10-CM

## 2021-01-21 DIAGNOSIS — J01.00 ACUTE NON-RECURRENT MAXILLARY SINUSITIS: ICD-10-CM

## 2021-01-21 PROCEDURE — U0005 INFEC AGEN DETEC AMPLI PROBE: HCPCS | Performed by: NURSE PRACTITIONER

## 2021-01-21 PROCEDURE — U0003 INFECTIOUS AGENT DETECTION BY NUCLEIC ACID (DNA OR RNA); SEVERE ACUTE RESPIRATORY SYNDROME CORONAVIRUS 2 (SARS-COV-2) (CORONAVIRUS DISEASE [COVID-19]), AMPLIFIED PROBE TECHNIQUE, MAKING USE OF HIGH THROUGHPUT TECHNOLOGIES AS DESCRIBED BY CMS-2020-01-R: HCPCS | Performed by: NURSE PRACTITIONER

## 2021-01-21 PROCEDURE — 99214 OFFICE O/P EST MOD 30 MIN: CPT | Performed by: NURSE PRACTITIONER

## 2021-01-21 RX ORDER — AZITHROMYCIN 250 MG/1
TABLET, FILM COATED ORAL
Qty: 6 TABLET | Refills: 0 | Status: SHIPPED | OUTPATIENT
Start: 2021-01-21 | End: 2021-01-25

## 2021-01-21 NOTE — PROGRESS NOTES
COVID-19 Virtual Visit     Assessment/Plan:    Problem List Items Addressed This Visit     None      Visit Diagnoses     Viral infection, unspecified    -  Primary    Relevant Orders    Novel Coronavirus (Covid-19),PCR [facility] - Collected at Mobile Vans or [facility]    Acute non-recurrent maxillary sinusitis        Relevant Medications    azithromycin (ZITHROMAX) 250 mg tablet         Disposition:     I referred patient to one of our centralized sites for a COVID-19 swab  Advised patient to stay in isolation (not leaving home unless to seek urgent medical care) until results discussed with patient with further instruction  Discussed important of wearing mask around household members, avoiding contact with household members and cleaning surfaces frequently  Discussed important of monitoring temperature and symptoms  Call if any changes or worsening in symptoms, especially any changes with respiratory related symptoms  Please call the office if you are experiencing any worsening of symptoms or no symptom improvement  Start azithromycin, this is the antibiotic, This is 2 pills on day one and then 1 pill for the following 4 days for URI  Possibly turning into sinus infection  Patient prefers azithromycin over augmentin  Advised to stop nasal spray  Increase fluids/ plain mucinex PRN    I have spent 15 minutes directly with the patient  Greater than 50% of this time was spent in counseling/coordination of care regarding: risks and benefits of treatment options, instructions for management, patient and family education and impressions  [facility] [place]  Provider located at [address] [place] [id number]    Recent Visits  No visits were found meeting these conditions     Showing recent visits within past 7 days and meeting all other requirements     Today's Visits  Date Type Provider Dept   01/21/21 Jose Enrique 20, DEE Pg Total [facility] today's visits and meeting all other requirements     Future Appointments  No visits were found meeting these conditions  Showing future appointments within next 150 days and meeting all other requirements      This virtual check-in was done via [employer] and patient was informed that this is a secure, HIPAA-compliant platform  She agrees to proceed  Patient agrees to participate in a virtual check in via telephone or video visit instead of presenting to the office to address urgent/immediate medical needs  Patient is aware this is a billable service  After connecting through [place], the patient was identified by name and date of birth  Meryle Kennedy was informed that this was a telemedicine visit and that the exam was being conducted confidentially over secure lines  My office door was closed  No one else was in the room  Meryle Kennedy acknowledged consent and understanding of privacy and security of the telemedicine visit  I informed the patient that I have reviewed her record in Epic and presented the opportunity for her to ask any questions regarding the visit today  The patient agreed to participate  Subjective: Meryle Kennedy is a 54 y o  female who is concerned about COVID-19  Patient's symptoms include fatigue, nasal congestion and rhinorrhea  Patient denies fever, chills, malaise, sore throat, anosmia, loss of taste, cough, shortness of breath, chest tightness, abdominal pain, nausea, vomiting, diarrhea, myalgias and headaches       Date of symptom onset: 1/16/2021    Exposure:   Contact with a person who is under investigation (PUI) for or who is positive for COVID-19 within the last 14 days?: No    Hospitalized recently for fever and/or lower respiratory symptoms?: No      Currently a healthcare worker that is involved in direct patient care?: No      Works in a special setting where the risk of COVID-19 transmission may be high? (this may include long-term care, correctional and prison facilities; homeless shelters; assisted-living facilities and group homes ): No      Resident in a special setting where the risk of COVID-19 transmission may be high? (this may include long-term care, correctional and prison facilities; homeless shelters; assisted-living facilities and group homes ): No      Saturday started with runny nose and then it transitioned to congestion  She used nasal spray  Seems to be okay during the day but stuffs up at bedtime  She has been using nasal spray for 5 days  She has been using vaporizer, saline, and decongestant  Nothing is helping it  She is also having right sided sinus pressure and into mouth       No results found for: Lulú Dia, SARSCORONMARIANA, [address]  Past Medical History:   Diagnosis Date    Abnormal Pap smear of cervix     with ASUS favoring benign, last assessed 1/9/14    Tinnitus     hypothyroid     Past Surgical History:   Procedure Laterality Date    TONSILLECTOMY AND ADENOIDECTOMY      TOTAL THYROIDECTOMY N/A 06/21/2014    Dr Abraham Going     Current Outpatient Medications   Medication Sig Dispense Refill    azithromycin (ZITHROMAX) 250 mg tablet Take 2 tablets today then 1 tablet daily x 4 days 6 tablet 0    Cyanocobalamin (B-12) 1000 MCG/ML KIT Inject as directed every 30 (thirty) days      FOLIC ACID PO Take by mouth daily      gabapentin (NEURONTIN) 100 mg capsule Take 2 tablets at bedtime 60 capsule 2    levothyroxine 200 mcg tablet Take 1 tablet (200 mcg total) by mouth daily 90 tablet 2    levothyroxine 50 mcg tablet TAKE 1 TABLET BY MOUTH EVERY DAY 90 tablet 1    Pyridoxine HCl (B-6 PO) Take by mouth daily       Current Facility-Administered Medications   Medication Dose Route Frequency Provider Last Rate Last Admin    cyanocobalamin injection 1,000 mcg  1,000 mcg Intramuscular Q30 Days DEE Yuen   1,000 mcg at 12/30/20 1612    cyanocobalamin injection 1,000 mcg 1,000 mcg Intramuscular S60 Days Bhupinder Brumfield DO   9,860 mcg at 10/29/20 1646     Allergies   Allergen Reactions    Lamisil [Terbinafine]     Prednisone      Rapid heart beat and palpitations        Review of Systems   Constitutional: Positive for fatigue  Negative for chills and fever  HENT: Positive for congestion and rhinorrhea  Negative for sore throat  Respiratory: Negative for cough, chest tightness and shortness of breath  Gastrointestinal: Negative for abdominal pain, diarrhea, nausea and vomiting  Musculoskeletal: Negative for myalgias  Neurological: Negative for headaches  Objective: There were no vitals filed for this visit  Physical Exam  Constitutional:       General: She is not in acute distress  Appearance: Normal appearance  She is not ill-appearing, toxic-appearing or diaphoretic  HENT:      Head: Normocephalic and atraumatic  Nose: Nose normal    Pulmonary:      Effort: Pulmonary effort is normal  No respiratory distress  Skin:     Coloration: Skin is not pale  Neurological:      General: No focal deficit present  Mental Status: She is alert and oriented to person, place, and time  Psychiatric:         Mood and Affect: Mood normal        VIRTUAL VISIT DISCLAIMER    Jae Muse acknowledges that she has consented to an online visit or consultation  She understands that the online visit is based solely on information provided by her, and that, in the absence of a face-to-face physical evaluation by the physician, the diagnosis she receives is both limited and provisional in terms of accuracy and completeness  This is not intended to replace a full medical face-to-face evaluation by the physician  Jae Muse understands and accepts these terms

## 2021-01-22 ENCOUNTER — TELEPHONE (OUTPATIENT)
Dept: FAMILY MEDICINE CLINIC | Facility: CLINIC | Age: 46
End: 2021-01-22

## 2021-01-22 LAB — SARS-COV-2 RNA RESP QL NAA+PROBE: NEGATIVE

## 2021-01-22 NOTE — TELEPHONE ENCOUNTER
Plain mucinex does not have a decongestant in it (she has to look at the ingredients) but this can help thin those secretions so they can drain  She doesn't need to take it, it was just a recommendation       Return to work note is fine to do

## 2021-01-22 NOTE — TELEPHONE ENCOUNTER
Odalys Acton called today- she got her negative COVID result  She is asking for a return to work note and when  She is also questioning the comment you made about her take mucinex  She rather not take that- she does not want to get possibly jittery from that  Is there something else you recommend she take? Please advise  Thank you!

## 2021-01-25 ENCOUNTER — TELEMEDICINE (OUTPATIENT)
Dept: FAMILY MEDICINE CLINIC | Facility: CLINIC | Age: 46
End: 2021-01-25
Payer: COMMERCIAL

## 2021-01-25 DIAGNOSIS — J01.00 ACUTE NON-RECURRENT MAXILLARY SINUSITIS: Primary | ICD-10-CM

## 2021-01-25 PROCEDURE — 99213 OFFICE O/P EST LOW 20 MIN: CPT | Performed by: NURSE PRACTITIONER

## 2021-01-25 PROCEDURE — 4004F PT TOBACCO SCREEN RCVD TLK: CPT | Performed by: NURSE PRACTITIONER

## 2021-01-25 RX ORDER — FLUTICASONE PROPIONATE 50 MCG
2 SPRAY, SUSPENSION (ML) NASAL DAILY
Qty: 1 BOTTLE | Refills: 0 | Status: SHIPPED | OUTPATIENT
Start: 2021-01-25 | End: 2021-09-27

## 2021-01-25 NOTE — PROGRESS NOTES
Virtual Regular Visit      Assessment/Plan:    Problem List Items Addressed This Visit     None      Visit Diagnoses     Acute non-recurrent maxillary sinusitis    -  Primary    Relevant Medications    fluticasone (FLONASE) 50 mcg/act nasal spray      Start Flonase, this is an intranasal corticosteroid  This is 1-2 sprays each nostril once daily  Please be aware that this can cause nasal mucosa irritation  Stop using if you experience any nose bleeds  This can be used for allergy symptoms as well as ear discomfort/pressure  Can use OTC cold medication PRN sparingly  Drink lots of water  Call if no improvement over next few days  Can see ENT at that time if needed  Reason for visit is   Chief Complaint   Patient presents with    Virtual Regular Visit        Encounter provider Nola Cantu    Provider located at [address] [place] [id number]      Recent Visits  Date Type Provider Dept   [facility]   01/21/21 [facility], [facility] Total [facility]   Showing recent visits within past 7 days and meeting all other requirements     Today's Visits  Date Type Provider Dept   01/25/21 Telemedicine DEE Cantu  Total [facility] today's visits and meeting all other requirements     Future Appointments  No visits were found meeting these conditions  Showing future appointments within next 150 days and meeting all other requirements      The patient was identified by name and date of birth  Effie Martin was informed that this is a telemedicine visit and that the visit is being conducted through [facility] and patient was informed that this is a secure, HIPAA-compliant platform  She agrees to proceed     My office door was closed  No one else was in the room  She acknowledged consent and understanding of privacy and security of the video platform   The patient has agreed to participate and understands they can discontinue the visit at any time  Patient is aware this is a billable service  Subjective  Jihan Camacho is a 54 y o  female  Visit today for upset stomach  She states she got side effects from mucinex, she states about 1 hour after taking it she got a headache and felt a little jittery  She's only done 3 doses of the medicine since Friday  She still feels stuffy but it seems like it's starting to improve a little bit  She's still not sleeping well at night  She was using the saline nasal spray which contributed to PND and then increased heart burn  She was doing the afrin in the beginning and the maximum congestion medication and using sudafed during the day  Negative Covid test 1/21/21             Past Medical History:   Diagnosis Date    Abnormal Pap smear of cervix     with ASUS favoring benign, last assessed 1/9/14    Tinnitus     hypothyroid       Past Surgical History:   Procedure Laterality Date    TONSILLECTOMY AND ADENOIDECTOMY      TOTAL THYROIDECTOMY N/A 06/21/2014    Dr Audi Fisher       Current Outpatient Medications   Medication Sig Dispense Refill    azithromycin (ZITHROMAX) 250 mg tablet Take 2 tablets today then 1 tablet daily x 4 days 6 tablet 0    Cyanocobalamin (B-12) 1000 MCG/ML KIT Inject as directed every 30 (thirty) days      fluticasone (FLONASE) 50 mcg/act nasal spray 2 sprays into each nostril daily 1 Bottle 0    FOLIC ACID PO Take by mouth daily      gabapentin (NEURONTIN) 100 mg capsule Take 2 tablets at bedtime 60 capsule 2    levothyroxine 200 mcg tablet Take 1 tablet (200 mcg total) by mouth daily 90 tablet 2    levothyroxine 50 mcg tablet TAKE 1 TABLET BY MOUTH EVERY DAY 90 tablet 1    Pyridoxine HCl (B-6 PO) Take by mouth daily       Current Facility-Administered Medications   Medication Dose Route Frequency Provider Last Rate Last Admin    cyanocobalamin injection 1,000 mcg  1,000 mcg Intramuscular Q30 Days DEE Marroquin   1,000 mcg at 12/30/20 1612    cyanocobalamin injection 1,000 mcg  1,000 mcg Intramuscular V06 Days Helga Wynn    1,973 mcg at 10/29/20 1646        Allergies   Allergen Reactions    Lamisil [Terbinafine]     Prednisone      Rapid heart beat and palpitations        Review of Systems   Constitutional: Negative for chills and fever  HENT: Positive for congestion  Eyes: Negative for discharge  Respiratory: Negative for shortness of breath  Cardiovascular: Negative for chest pain  Gastrointestinal: Negative for constipation and diarrhea  Genitourinary: Negative for difficulty urinating  Musculoskeletal: Negative for joint swelling  Skin: Negative for rash  Neurological: Negative for headaches  Hematological: Negative for adenopathy  Psychiatric/Behavioral: The patient is not nervous/anxious  Video Exam    There were no vitals filed for this visit  Physical Exam  Constitutional:       General: She is not in acute distress  Appearance: Normal appearance  She is not ill-appearing, toxic-appearing or diaphoretic  HENT:      Head: Normocephalic and atraumatic  Nose: Nose normal    Eyes:      General: No scleral icterus  Neck:      Musculoskeletal: Normal range of motion  Pulmonary:      Effort: Pulmonary effort is normal  No respiratory distress  Breath sounds: No wheezing (no audible wheezes)  Skin:     Coloration: Skin is not pale  Neurological:      Mental Status: She is alert and oriented to person, place, and time  Psychiatric:         Mood and Affect: Mood normal           I spent 20 minutes with patient today in which greater than 50% of the time was spent in counseling/coordination of care regarding congestion      VIRTUAL VISIT DISCLAIMER    Marianna Espinoza acknowledges that she has consented to an online visit or consultation   She understands that the online visit is based solely on information provided by her, and that, in the absence of a face-to-face physical evaluation by the physician, the diagnosis she receives is both limited and provisional in terms of accuracy and completeness  This is not intended to replace a full medical face-to-face evaluation by the physician  Maya Gale understands and accepts these terms

## 2021-01-28 ENCOUNTER — TELEPHONE (OUTPATIENT)
Dept: FAMILY MEDICINE CLINIC | Facility: CLINIC | Age: 46
End: 2021-01-28

## 2021-01-28 ENCOUNTER — CLINICAL SUPPORT (OUTPATIENT)
Dept: FAMILY MEDICINE CLINIC | Facility: CLINIC | Age: 46
End: 2021-01-28
Payer: COMMERCIAL

## 2021-01-28 DIAGNOSIS — E53.8 B12 DEFICIENCY: ICD-10-CM

## 2021-01-28 PROCEDURE — 96372 THER/PROPH/DIAG INJ SC/IM: CPT

## 2021-01-28 RX ADMIN — CYANOCOBALAMIN 1000 MCG: 1000 INJECTION INTRAMUSCULAR; SUBCUTANEOUS at 16:16

## 2021-01-28 NOTE — TELEPHONE ENCOUNTER
I called Greta Dykes  She already has seen [facility]  I did give pt their number so that she may call to schedule   Pt asking anything you can recommend  in the mean time to give relief, due to she feels it will be some time til she can get in with them? Pt has taken Flonase in the am  Is it ok to do an Afrin at night? That helps her but it wakes her up every  1 1/2 hours  If she is on Flonase can she take a sudafed instead of an afrin?

## 2021-01-28 NOTE — TELEPHONE ENCOUNTER
Usually they're pretty good at getting people in within a few day period  The only other thing I could offer right now would be the antihistamine nasal spray but that usually is better for a "runny drippy" nose but it could be tried  As I told her before, it's okay to do the Afrin for another 1-2 days but try to limit it past that  Flonase and the sudafed would be fine   Let me know if she wants to the try the other nasal spray, but either way I still recommend ENT

## 2021-01-28 NOTE — TELEPHONE ENCOUNTER
At this point ENT would be next best option, please give her [facility] information unless she is already established at an ENT

## 2021-01-28 NOTE — TELEPHONE ENCOUNTER
Patient is in office- the Flonase isnt helping with the stuffiness  The mucinex gave her the heart palpitations like she was afraid of  The Flonase didn't give her that but did not help  She is still havening trouble sleeping because if it  What do you suggest from her from here? Please advise  Thank you!

## 2021-01-29 NOTE — TELEPHONE ENCOUNTER
Pt aware of message details  Pt states she would much rather try the flonase and sudafed instead  Pt was advised to follow up with ENT

## 2021-01-30 ENCOUNTER — OFFICE VISIT (OUTPATIENT)
Dept: URGENT CARE | Facility: MEDICAL CENTER | Age: 46
End: 2021-01-30
Payer: COMMERCIAL

## 2021-01-30 VITALS
BODY MASS INDEX: 30.78 KG/M2 | RESPIRATION RATE: 18 BRPM | HEART RATE: 66 BPM | OXYGEN SATURATION: 100 % | TEMPERATURE: 98.8 F | WEIGHT: 215 LBS | HEIGHT: 70 IN

## 2021-01-30 DIAGNOSIS — K14.0 GLOSSITIS: ICD-10-CM

## 2021-01-30 DIAGNOSIS — R09.81 NASAL CONGESTION: Primary | ICD-10-CM

## 2021-01-30 PROCEDURE — 99213 OFFICE O/P EST LOW 20 MIN: CPT | Performed by: FAMILY MEDICINE

## 2021-01-30 NOTE — PROGRESS NOTES
[facility]        NAME: Zohra Bridges is a 54 y o  female  : 1975    MRN: [id number]  DATE: 2021  TIME: 6:03 PM    Assessment and Plan   Nasal congestion [R09 81]  1  Nasal congestion     2  Glossitis           Patient Instructions       Follow up with PCP in 3-5 days  Proceed to  ER if symptoms worsen  Chief Complaint     Chief Complaint   Patient presents with    Nasal Congestion     x 2 weeks  Has had video visit with PCP along with f/u phone call  PCP has rx'd and/or recommended a Z-pack, Mucinex, Flonase, Saline Spray in addition to Sudafed and Afrin and Breathe Right nasal strips pt had been using with no relief  Covid test done 21 (neg)  History of Present Illness        58-year-old female withNasal Congestion (x 2 weeks  Has had video visit with PCP along with f/u phone call  PCP has rx'd and/or recommended a Z-pack, Mucinex, Flonase, Saline Spray in addition to Sudafed and Afrin and Breathe Right nasal strips pt had been using with  Minimal  relief  Covid test done 21 (neg)  none about medications seem to be making symptoms worse  In fact her nasal congestion seems to be getting progressively worse especially right-sided  Denies sinus pain and pressure  Denies purulent nasal discharge  Denies postnasal drip  She is scheduled to see Ear Nose and Throat specialist in next 4 day because of symptoms  Denies chronic history of nasal congestion or current sinus infection  She question whether medication could be making symptoms worse  Also in the last 3-4 days she has had sore tongue swelling redness  She has also noticed some lumps in the back of her tongue  Review of Systems   Review of Systems   Constitutional: Negative  HENT: Positive for congestion and sore throat  Respiratory: Negative            Current Medications       Current Outpatient Medications:     Cyanocobalamin (B-12) 1000 MCG/ML KIT, Inject as directed every 30 (thirty) days, Disp: , Rfl:     fluticasone (FLONASE) 50 mcg/act nasal spray, 2 sprays into each nostril daily, Disp: 1 Bottle, Rfl: 0    FOLIC ACID PO, Take by mouth daily, Disp: , Rfl:     gabapentin (NEURONTIN) 100 mg capsule, Take 2 tablets at bedtime, Disp: 60 capsule, Rfl: 2    levothyroxine 200 mcg tablet, Take 1 tablet (200 mcg total) by mouth daily, Disp: 90 tablet, Rfl: 2    levothyroxine 50 mcg tablet, TAKE 1 TABLET BY MOUTH EVERY DAY, Disp: 90 tablet, Rfl: 1    Pyridoxine HCl (B-6 PO), Take by mouth daily, Disp: , Rfl:     Current Facility-Administered Medications:     cyanocobalamin injection 1,000 mcg, 1,000 mcg, Intramuscular, Q30 Days, DEE Hardy, 1,000 mcg at 01/28/21 1616    cyanocobalamin injection 1,000 mcg, 1,000 mcg, Intramuscular, K21 Days, Bhupinder Brumfield DO, 4,117 mcg at 10/29/20 1646    Current Allergies     Allergies as of 01/30/2021 - Reviewed 01/30/2021   Allergen Reaction Noted    Lamisil [terbinafine]  02/27/2015    Prednisone  05/02/2018            The following portions of the patient's history were reviewed and updated as appropriate: allergies, current medications, past family history, past medical history, past social history, past surgical history and problem list      Past Medical History:   Diagnosis Date    Abnormal Pap smear of cervix     with ASUS favoring benign, last assessed 1/9/14    Tinnitus     hypothyroid       Past Surgical History:   Procedure Laterality Date    TONSILLECTOMY AND ADENOIDECTOMY      TOTAL THYROIDECTOMY N/A 06/21/2014    Dr Jonelle Connell       Family History   Problem Relation Age of Onset    Diabetes Son     Diabetes type II Mother     Hypertension Father         benign essential         Medications have been verified  Objective   Pulse 66   Temp 98 8 °F (37 1 °C)   Resp 18   Ht 5' 10" (1 778 m)   Wt 97 5 kg (215 lb)   SpO2 100%   BMI 30 85 kg/m²   No LMP recorded         Physical Exam     Physical Exam  Vitals signs and nursing note reviewed  Constitutional:       Appearance: Normal appearance  HENT:      Right Ear: Tympanic membrane, ear canal and external ear normal       Left Ear: Tympanic membrane, ear canal and external ear normal       Nose:      Comments: Erythematous hypertrophic turbinates right and left  Mild tenderness elicited upon palpation over the right maxillary sinus  Mouth/Throat:      Mouth: Mucous membranes are moist       Comments: Posterior pharynx reveals mild cobblestoning  No exudates visualized  There were enlarged papillae posterior tongue  Tongue seems erythematous, geographic versus glossitis  No palpable mass base of tongue  Neck:      Musculoskeletal: Normal range of motion  Comments: Left submandibular lymphadenopathy  Nontender to touch  Cardiovascular:      Rate and Rhythm: Normal rate and regular rhythm  Pulmonary:      Effort: Pulmonary effort is normal       Breath sounds: Normal breath sounds  Lymphadenopathy:      Cervical: Cervical adenopathy present  Neurological:      Mental Status: She is alert

## 2021-01-30 NOTE — PATIENT INSTRUCTIONS
Patient is afebrile  Also she has sinusitis  However I advised patient to discontinue fluticasone nasal spray  Consider saline nasal spray as needed  From cool mist vaporizer at bedtime  For postnasal drip, recommended patient raise head of bed at night  I review all medication and potential adverse effects and cannot find at length for glossitis  Other than potential vitamin B12 deficiency however patient currently on B12 injection monthly basis  Strongly encourage patient to keep appointment with [facility]unders and Throat specialist   She expressed understanding  Geographic Tongue   WHAT YOU NEED TO KNOW:   GT is a condition that causes lesions to form on your tongue  This makes your tongue look like a map  The lesions stay on your tongue from several days to weeks  The lesions come and go in different areas of the tongue  GT goes away by itself  GT may cause, or happen along with, fissured tongue  Your healthcare provider can give you information about fissured tongue  DISCHARGE INSTRUCTIONS:   Contact your healthcare or dental provider if:   · You cannot eat because of your pain  · Your pain gets worse  · You have lesions that last for more than 10 days  · You have questions or concerns about your condition or care  Self care:   · Do not eat hot, spicy, or salty foods  · Do not have acidic drinks, such as orange juice  · Do not use toothpastes with additives or whitening agents  · Do not smoke  Smoke from cigarettes and cigars can cause increased mouth pain  Ask your healthcare provider for information if you currently smoke and need help to quit  Follow up with your healthcare provider as directed:  Write down your questions so you remember to ask them during your visits  © Copyright [address] Information is for End User's use only and may not be sold, redistributed or otherwise used for commercial purposes   All illustrations and images included in CareNotes® are the copyrighted property of A D A M , Inc  or [facility] Carlos Epps   The above information is an  only  It is not intended as medical advice for individual conditions or treatments  Talk to your doctor, nurse or pharmacist before following any medical regimen to see if it is safe and effective for you

## 2021-02-06 ENCOUNTER — HOSPITAL ENCOUNTER (OUTPATIENT)
Dept: MAMMOGRAPHY | Facility: MEDICAL CENTER | Age: 46
Discharge: HOME | End: 2021-02-06
Payer: COMMERCIAL

## 2021-02-06 VITALS — HEIGHT: 70 IN | BODY MASS INDEX: 31.78 KG/M2 | WEIGHT: 222 LBS

## 2021-02-06 DIAGNOSIS — Z12.31 ENCOUNTER FOR SCREENING MAMMOGRAM FOR MALIGNANT NEOPLASM OF BREAST: ICD-10-CM

## 2021-02-06 DIAGNOSIS — R20.2 PARESTHESIA: ICD-10-CM

## 2021-02-06 PROCEDURE — 77063 BREAST TOMOSYNTHESIS BI: CPT

## 2021-02-06 PROCEDURE — 77067 SCR MAMMO BI INCL CAD: CPT

## 2021-02-08 RX ORDER — GABAPENTIN 100 MG/1
CAPSULE ORAL
Qty: 60 CAPSULE | Refills: 5 | Status: SHIPPED | OUTPATIENT
Start: 2021-02-08 | End: 2021-05-06 | Stop reason: SDUPTHER

## 2021-02-16 ENCOUNTER — OFFICE VISIT (OUTPATIENT)
Dept: FAMILY MEDICINE CLINIC | Facility: CLINIC | Age: 46
End: 2021-02-16
Payer: COMMERCIAL

## 2021-02-16 VITALS
HEART RATE: 75 BPM | WEIGHT: 226 LBS | HEIGHT: 70 IN | TEMPERATURE: 97.7 F | OXYGEN SATURATION: 97 % | RESPIRATION RATE: 16 BRPM | DIASTOLIC BLOOD PRESSURE: 68 MMHG | BODY MASS INDEX: 32.35 KG/M2 | SYSTOLIC BLOOD PRESSURE: 118 MMHG

## 2021-02-16 DIAGNOSIS — T78.40XS ALLERGY, SEQUELA: ICD-10-CM

## 2021-02-16 DIAGNOSIS — J32.0 CHRONIC MAXILLARY SINUSITIS: Primary | ICD-10-CM

## 2021-02-16 PROCEDURE — 99213 OFFICE O/P EST LOW 20 MIN: CPT | Performed by: FAMILY MEDICINE

## 2021-02-16 RX ORDER — MONTELUKAST SODIUM 10 MG/1
10 TABLET ORAL
Qty: 30 TABLET | Refills: 5 | Status: SHIPPED | OUTPATIENT
Start: 2021-02-16 | End: 2021-09-27

## 2021-02-24 NOTE — PROGRESS NOTES
Assessment/Plan:      Patient has tried multiple things  Has been on nasal steroids antihistamines ocean nasal spray  Cannot tolerate prednisone  Antibiotics did not help  Under the care [facility]  Has follow-up with them scheduled  Recommend trial Singulair in case this is possible allergies  Recommend CT scan of the sinuses  She will follow-up with ear nose and throat     Diagnoses and all orders for this visit:    Chronic maxillary sinusitis  -     CT sinus wo contrast; Future    Allergy, sequela  -     montelukast (SINGULAIR) 10 mg tablet; Take 1 tablet (10 mg total) by mouth daily at bedtime        1  Chronic maxillary sinusitis  CT sinus wo contrast   2  Allergy, sequela  montelukast (SINGULAIR) 10 mg tablet       Subjective:        Patient ID: Jae Muse is a 54 y o  female  Chief Complaint   Patient presents with    Nasal Congestion     Pt has c/o congestion times 1 month         48-year-old white female with chronic right-sided ear pressure and nasal congestion  Been under the care of [facility]  Had NPL  Not responded to nasal sprays antihistamines etc       The following portions of the patient's history were reviewed and updated as appropriate: past medical history, past surgical history and problem list       Review of Systems   Constitutional: Negative for chills, fatigue and fever  HENT: Positive for congestion and sinus pressure  Negative for rhinorrhea, sinus pain, sore throat and tinnitus  Right ear pressure   Eyes: Negative for visual disturbance  Respiratory: Negative for cough and shortness of breath  Cardiovascular: Negative for chest pain, palpitations and leg swelling  Gastrointestinal: Negative for abdominal pain  Neurological: Negative for dizziness and headaches  Psychiatric/Behavioral: The patient is not nervous/anxious            Objective:  /68   Pulse 75   Temp 97 7 °F (36 5 °C) (Temporal)   Resp 16   Ht 5' 10" (1 788 m)   Wt 103 kg (226 lb)   LMP 01/23/2021   SpO2 97%   BMI 32 43 kg/m²        Physical Exam  Vitals signs and nursing note reviewed  Constitutional:       General: She is not in acute distress  HENT:      Head: Normocephalic  Right Ear: Tympanic membrane and ear canal normal       Left Ear: Tympanic membrane and ear canal normal       Nose: Congestion present  Comments: Slight deviated septum  Eyes:      General: No scleral icterus  Pupils: Pupils are equal, round, and reactive to light  Cardiovascular:      Rate and Rhythm: Normal rate and regular rhythm  Heart sounds: Normal heart sounds  No murmur  Pulmonary:      Breath sounds: Normal breath sounds  Lymphadenopathy:      Cervical: No cervical adenopathy  Skin:     Coloration: Skin is not pale  Neurological:      General: No focal deficit present  Mental Status: She is alert and oriented to person, place, and time  Psychiatric:         Mood and Affect: Mood normal          Behavior: Behavior normal          Thought Content:  Thought content normal

## 2021-03-09 ENCOUNTER — CLINICAL SUPPORT (OUTPATIENT)
Dept: FAMILY MEDICINE CLINIC | Facility: CLINIC | Age: 46
End: 2021-03-09
Payer: COMMERCIAL

## 2021-03-09 DIAGNOSIS — E53.8 B12 DEFICIENCY: ICD-10-CM

## 2021-03-09 PROCEDURE — 96372 THER/PROPH/DIAG INJ SC/IM: CPT

## 2021-03-09 RX ADMIN — CYANOCOBALAMIN 1000 MCG: 1000 INJECTION INTRAMUSCULAR; SUBCUTANEOUS at 16:12

## 2021-03-12 ENCOUNTER — APPOINTMENT (OUTPATIENT)
Dept: LAB | Facility: MEDICAL CENTER | Age: 46
End: 2021-03-12
Payer: COMMERCIAL

## 2021-03-12 DIAGNOSIS — E53.8 FOLATE DEFICIENCY: ICD-10-CM

## 2021-03-12 DIAGNOSIS — E53.8 VITAMIN B 12 DEFICIENCY: ICD-10-CM

## 2021-03-12 DIAGNOSIS — E89.0 POSTOPERATIVE HYPOTHYROIDISM: ICD-10-CM

## 2021-03-12 DIAGNOSIS — E53.1 VITAMIN B6 DEFICIENCY: ICD-10-CM

## 2021-03-12 DIAGNOSIS — Z00.00 HEALTHCARE MAINTENANCE: ICD-10-CM

## 2021-03-12 LAB
ANION GAP SERPL CALCULATED.3IONS-SCNC: 8 MMOL/L (ref 4–13)
BUN SERPL-MCNC: 10 MG/DL (ref 5–25)
CALCIUM SERPL-MCNC: 8.6 MG/DL (ref 8.3–10.1)
CHLORIDE SERPL-SCNC: 105 MMOL/L (ref 100–108)
CO2 SERPL-SCNC: 26 MMOL/L (ref 21–32)
CREAT SERPL-MCNC: 0.94 MG/DL (ref 0.6–1.3)
FOLATE SERPL-MCNC: 3.5 NG/ML (ref 3.1–17.5)
GFR SERPL CREATININE-BSD FRML MDRD: 73 ML/MIN/1.73SQ M
GLUCOSE SERPL-MCNC: 99 MG/DL (ref 65–140)
POTASSIUM SERPL-SCNC: 3.7 MMOL/L (ref 3.5–5.3)
SODIUM SERPL-SCNC: 139 MMOL/L (ref 136–145)
T4 FREE SERPL-MCNC: 1.27 NG/DL (ref 0.76–1.46)
TSH SERPL DL<=0.05 MIU/L-ACNC: 0.86 UIU/ML (ref 0.36–3.74)
VIT B12 SERPL-MCNC: 1059 PG/ML (ref 100–900)

## 2021-03-12 PROCEDURE — 82746 ASSAY OF FOLIC ACID SERUM: CPT

## 2021-03-12 PROCEDURE — 84443 ASSAY THYROID STIM HORMONE: CPT

## 2021-03-12 PROCEDURE — 80048 BASIC METABOLIC PNL TOTAL CA: CPT

## 2021-03-12 PROCEDURE — 82607 VITAMIN B-12: CPT

## 2021-03-12 PROCEDURE — 84207 ASSAY OF VITAMIN B-6: CPT

## 2021-03-12 PROCEDURE — 84439 ASSAY OF FREE THYROXINE: CPT

## 2021-03-12 PROCEDURE — 36415 COLL VENOUS BLD VENIPUNCTURE: CPT

## 2021-03-19 LAB — VIT B6 SERPL-MCNC: 7.6 UG/L (ref 2–32.8)

## 2021-03-24 ENCOUNTER — TELEPHONE (OUTPATIENT)
Dept: FAMILY MEDICINE CLINIC | Facility: CLINIC | Age: 46
End: 2021-03-24

## 2021-03-24 ENCOUNTER — OFFICE VISIT (OUTPATIENT)
Dept: FAMILY MEDICINE CLINIC | Facility: CLINIC | Age: 46
End: 2021-03-24
Payer: COMMERCIAL

## 2021-03-24 VITALS
TEMPERATURE: 97.6 F | BODY MASS INDEX: 32.21 KG/M2 | WEIGHT: 225 LBS | HEART RATE: 72 BPM | SYSTOLIC BLOOD PRESSURE: 118 MMHG | HEIGHT: 70 IN | OXYGEN SATURATION: 99 % | DIASTOLIC BLOOD PRESSURE: 72 MMHG

## 2021-03-24 DIAGNOSIS — E53.8 B12 DEFICIENCY: ICD-10-CM

## 2021-03-24 DIAGNOSIS — Z72.0 TOBACCO USE: ICD-10-CM

## 2021-03-24 DIAGNOSIS — Z12.4 SCREENING FOR CERVICAL CANCER: ICD-10-CM

## 2021-03-24 DIAGNOSIS — Z23 ENCOUNTER FOR IMMUNIZATION: ICD-10-CM

## 2021-03-24 DIAGNOSIS — R20.2 PARESTHESIA OF BILATERAL LEGS: Primary | ICD-10-CM

## 2021-03-24 DIAGNOSIS — E53.8 FOLATE DEFICIENCY: ICD-10-CM

## 2021-03-24 DIAGNOSIS — E89.0 POSTOPERATIVE HYPOTHYROIDISM: ICD-10-CM

## 2021-03-24 DIAGNOSIS — E53.1 VITAMIN B6 DEFICIENCY: ICD-10-CM

## 2021-03-24 PROCEDURE — 3008F BODY MASS INDEX DOCD: CPT | Performed by: FAMILY MEDICINE

## 2021-03-24 PROCEDURE — 99213 OFFICE O/P EST LOW 20 MIN: CPT | Performed by: FAMILY MEDICINE

## 2021-03-24 PROCEDURE — 4004F PT TOBACCO SCREEN RCVD TLK: CPT | Performed by: FAMILY MEDICINE

## 2021-03-24 RX ORDER — LEVOTHYROXINE SODIUM 0.2 MG/1
200 TABLET ORAL DAILY
Qty: 90 TABLET | Refills: 2 | Status: SHIPPED | OUTPATIENT
Start: 2021-03-24 | End: 2021-12-05 | Stop reason: SDUPTHER

## 2021-03-24 RX ORDER — LEVOTHYROXINE SODIUM 0.05 MG/1
50 TABLET ORAL DAILY
Qty: 90 TABLET | Refills: 2 | Status: SHIPPED | OUTPATIENT
Start: 2021-03-24 | End: 2021-12-05 | Stop reason: SDUPTHER

## 2021-03-28 NOTE — PROGRESS NOTES
Assessment/Plan:       overall patient doing well  Continue B12 replacement  Labs reviewed  Recommend recheck 6 months with CMP lipid panel TSH B6 and folate  Encouraged to quit smoking  Recommend following up with Neurology in obtaining MRI of the brain just to make sure that nor neuro degenerative problem is causing her paresthesias  Overdue for Pap  Patient reminded  Next office visit recommend pneumonia   Diagnoses and all orders for this visit:    Paresthesia of bilateral legs    B12 deficiency  -     Vitamin B12; Future    Postoperative hypothyroidism  -     Comprehensive metabolic panel; Future  -     Lipid panel; Future  -     TSH, 3rd generation; Future  -     levothyroxine 200 mcg tablet; Take 1 tablet (200 mcg total) by mouth daily  -     levothyroxine 50 mcg tablet; Take 1 tablet (50 mcg total) by mouth daily    Vitamin B6 deficiency  -     Vitamin B6; Future    Folate deficiency  -     Folate; Future    Tobacco use    Encounter for immunization    Screening for cervical cancer    Other orders  -     Cancel: PNEUMOCOCCAL POLYSACCHARIDE VACCINE 23-VALENT =>1YO SQ IM  -     Cancel: Ambulatory referral to Obstetrics / Gynecology; Future        1  Paresthesia of bilateral legs     2  B12 deficiency  Vitamin B12   3  Postoperative hypothyroidism  Comprehensive metabolic panel    Lipid panel    TSH, 3rd generation    levothyroxine 200 mcg tablet    levothyroxine 50 mcg tablet   4  Vitamin B6 deficiency  Vitamin B6   5  Folate deficiency  Folate   6  Tobacco use     7  Encounter for immunization     8  Screening for cervical cancer         Subjective:        Patient ID: Jihan Camacho is a 54 y o  female  Chief Complaint   Patient presents with    Follow-up     burning sensation on skin         Patient for recheck of labs  Overall doing well  Notes that when she went extra week  without B12 injection started to get burning sensation back on body    Has not completed her MRI from Neurology yet       The following portions of the patient's history were reviewed and updated as appropriate: past medical history, past surgical history and problem list       Review of Systems   Constitutional: Negative for fatigue  Eyes: Negative for visual disturbance  Respiratory: Negative for cough and shortness of breath  Cardiovascular: Negative for chest pain, palpitations and leg swelling  Gastrointestinal: Negative for abdominal pain  Neurological: Negative for dizziness and headaches  Psychiatric/Behavioral: The patient is not nervous/anxious  Objective:  /72 (BP Location: Left arm, Patient Position: Sitting, Cuff Size: Adult)   Pulse 72   Temp 97 6 °F (36 4 °C) (Temporal)   Ht 5' 10" (1 778 m)   Wt 102 kg (225 lb)   SpO2 99%   BMI 32 28 kg/m²        Physical Exam  Vitals signs and nursing note reviewed  Constitutional:       General: She is not in acute distress  HENT:      Head: Normocephalic  Eyes:      General: No scleral icterus  Pupils: Pupils are equal, round, and reactive to light  Cardiovascular:      Heart sounds: Normal heart sounds  No murmur  Pulmonary:      Breath sounds: Normal breath sounds  Lymphadenopathy:      Cervical: No cervical adenopathy  Skin:     Coloration: Skin is not pale  Findings: No rash  Neurological:      General: No focal deficit present  Mental Status: She is alert and oriented to person, place, and time  Psychiatric:         Mood and Affect: Mood normal          Behavior: Behavior normal          Thought Content:  Thought content normal

## 2021-04-06 ENCOUNTER — CLINICAL SUPPORT (OUTPATIENT)
Dept: FAMILY MEDICINE CLINIC | Facility: CLINIC | Age: 46
End: 2021-04-06
Payer: COMMERCIAL

## 2021-04-06 ENCOUNTER — TELEPHONE (OUTPATIENT)
Dept: FAMILY MEDICINE CLINIC | Facility: CLINIC | Age: 46
End: 2021-04-06

## 2021-04-06 DIAGNOSIS — E53.8 B12 DEFICIENCY: ICD-10-CM

## 2021-04-06 PROCEDURE — 96372 THER/PROPH/DIAG INJ SC/IM: CPT | Performed by: FAMILY MEDICINE

## 2021-04-06 RX ADMIN — CYANOCOBALAMIN 1000 MCG: 1000 INJECTION INTRAMUSCULAR; SUBCUTANEOUS at 16:13

## 2021-04-30 ENCOUNTER — TELEMEDICINE (OUTPATIENT)
Dept: FAMILY MEDICINE CLINIC | Facility: CLINIC | Age: 46
End: 2021-04-30
Payer: COMMERCIAL

## 2021-04-30 DIAGNOSIS — R10.84 GENERALIZED ABDOMINAL PAIN: Primary | ICD-10-CM

## 2021-04-30 PROCEDURE — 99214 OFFICE O/P EST MOD 30 MIN: CPT | Performed by: NURSE PRACTITIONER

## 2021-04-30 NOTE — PROGRESS NOTES
Virtual Regular Visit      Assessment/Plan:    Problem List Items Addressed This Visit     None      Visit Diagnoses     Generalized abdominal pain    -  Primary    Relevant Orders    CBC and differential    Comprehensive metabolic panel    Lipase    Amylase      Start pepcid routinely, 20 mg BID  Complete blood work  reviewed red flag symptoms and when to go to ER with patient  She verbalized understanding  Will follow up with lab results  Please call the office if you are experiencing any worsening of symptoms or no symptom improvement  Reason for visit is   Chief Complaint   Patient presents with    Virtual Regular Visit        Encounter provider Kelly Jimenes, [facility]    Provider located at [address] [place] [address] [address] [facility] [address] [phone]      Recent Visits  No visits were found meeting these conditions  Showing recent visits within past 7 days and meeting all other requirements     Today's Visits  Date Type Provider Dept   04/30/21 Telemedicine Kelly Jimenes, [facility]   Showing today's visits and meeting all other requirements     Future Appointments  No visits were found meeting these conditions  Showing future appointments within next 150 days and meeting all other requirements        The patient was identified by name and date of birth  Dianna Pierre was informed that this is a telemedicine visit and that the visit is being conducted through [facility] and patient was informed that this is a secure, HIPAA-compliant platform  She agrees to proceed     My office door was closed  No one else was in the room  She acknowledged consent and understanding of privacy and security of the video platform  The patient has agreed to participate and understands they can discontinue the visit at any time  Patient is aware this is a billable service       Subjective  Dianna Pierre is a 54 y o  female  Virtual visit today to discuss stomach issues  Last Thursday she had some GI symptoms and then this started up on Tuesday again  She has some stomach pain, increased frequency in bowel movements  It seems like she has a bowel movement within an hour of eating  The stomach pain is located on left side both above and below her belly button  No fevers or chills  Pain is intermittent  It's about 5/10 when it happens  Has been taking pepto PRN  She also has heart burn that seems to come out of no where  Last Thursday she states the first 3 times she had a BM it was diarrhea but then solidified  It's loose but it's not diarrhea now  It's more smaller pieces  She states she's gassy too  She has been trying to eat more bland things  No dietary changes prior  No vomiting  Does get some nausea with stomach pain at times          Past Medical History:   Diagnosis Date    Abnormal Pap smear of cervix     with ASUS favoring benign, last assessed 1/9/14    Tinnitus     hypothyroid       Past Surgical History:   Procedure Laterality Date    BREAST BIOPSY Left 2011    benign    TONSILLECTOMY AND ADENOIDECTOMY      TOTAL THYROIDECTOMY N/A 06/21/2014    Dr Hannah Benavidez BIOPSY LEFT COMPLETE Left        Current Outpatient Medications   Medication Sig Dispense Refill    azelastine (ASTELIN) 0 1 % nasal spray 2 sprays into each nostril 2 (two) times a day Use in each nostril as directed 1 Bottle 11    Cyanocobalamin (B-12) 1000 MCG/ML KIT Inject as directed every 30 (thirty) days      fluticasone (FLONASE) 50 mcg/act nasal spray 2 sprays into each nostril daily 1 Bottle 0    FOLIC ACID PO Take by mouth daily      gabapentin (NEURONTIN) 100 mg capsule Take 2 tablets at bedtime (Patient taking differently: as needed Take 2 tablets at bedtime) 60 capsule 5    levothyroxine 200 mcg tablet Take 1 tablet (200 mcg total) by mouth daily 90 tablet 2    levothyroxine 50 mcg tablet Take 1 tablet (50 mcg total) by mouth daily 90 tablet 2    montelukast (SINGULAIR) 10 mg tablet Take 1 tablet (10 mg total) by mouth daily at bedtime 30 tablet 5    Pyridoxine HCl (B-6 PO) Take by mouth daily       Current Facility-Administered Medications   Medication Dose Route Frequency Provider Last Rate Last Admin    cyanocobalamin injection 1,000 mcg  1,000 mcg Intramuscular Q30 Days Griselda Barba, CRNP   1,000 mcg at 04/06/21 1613    cyanocobalamin injection 1,000 mcg  1,000 mcg Intramuscular Q66 Days Pernell Montalvo DO   8,035 mcg at 10/29/20 1646        Allergies   Allergen Reactions    Lamisil [Terbinafine]     Prednisone      Rapid heart beat and palpitations        Review of Systems   Constitutional: Negative for chills and fever  Eyes: Negative for discharge  Respiratory: Negative for shortness of breath  Cardiovascular: Negative for chest pain  Gastrointestinal: Positive for diarrhea and nausea  Negative for constipation  Genitourinary: Negative for difficulty urinating  Musculoskeletal: Negative for joint swelling  Skin: Negative for rash  Neurological: Negative for headaches  Hematological: Negative for adenopathy  Psychiatric/Behavioral: The patient is not nervous/anxious  Video Exam    There were no vitals filed for this visit  Physical Exam  Constitutional:       General: She is not in acute distress  Appearance: Normal appearance  She is not ill-appearing, toxic-appearing or diaphoretic  HENT:      Head: Normocephalic and atraumatic  Nose: Nose normal    Pulmonary:      Effort: Pulmonary effort is normal  No respiratory distress  Skin:     Coloration: Skin is not pale  Neurological:      Mental Status: She is alert and oriented to person, place, and time     Psychiatric:         Mood and Affect: Mood normal           I spent 20 minutes with patient today in which greater than 50% of the time was spent in counseling/coordination of care regarding GI symptoms      VIRTUAL VISIT DISCLAIMER    Ledy Ji acknowledges that she has consented to an online visit or consultation  She understands that the online visit is based solely on information provided by her, and that, in the absence of a face-to-face physical evaluation by the physician, the diagnosis she receives is both limited and provisional in terms of accuracy and completeness  This is not intended to replace a full medical face-to-face evaluation by the physician  Ledy Ji understands and accepts these terms

## 2021-05-06 ENCOUNTER — CLINICAL SUPPORT (OUTPATIENT)
Dept: FAMILY MEDICINE CLINIC | Facility: CLINIC | Age: 46
End: 2021-05-06
Payer: COMMERCIAL

## 2021-05-06 DIAGNOSIS — R20.2 PARESTHESIA: ICD-10-CM

## 2021-05-06 DIAGNOSIS — Z23 ENCOUNTER FOR IMMUNIZATION: Primary | ICD-10-CM

## 2021-05-06 DIAGNOSIS — E53.8 B12 DEFICIENCY: ICD-10-CM

## 2021-05-06 PROCEDURE — 90471 IMMUNIZATION ADMIN: CPT

## 2021-05-06 PROCEDURE — 96372 THER/PROPH/DIAG INJ SC/IM: CPT

## 2021-05-06 PROCEDURE — 90732 PPSV23 VACC 2 YRS+ SUBQ/IM: CPT

## 2021-05-06 RX ADMIN — CYANOCOBALAMIN 1000 MCG: 1000 INJECTION INTRAMUSCULAR; SUBCUTANEOUS at 16:14

## 2021-05-06 NOTE — TELEPHONE ENCOUNTER
Patient has increased gabapentin 100 mg to 3 tablets at bedtime, she needs a refill sent to [address] [place] [address]

## 2021-05-07 RX ORDER — GABAPENTIN 100 MG/1
CAPSULE ORAL
Qty: 60 CAPSULE | Refills: 5 | Status: SHIPPED | OUTPATIENT
Start: 2021-05-07 | End: 2021-06-14 | Stop reason: SDUPTHER

## 2021-06-03 ENCOUNTER — CLINICAL SUPPORT (OUTPATIENT)
Dept: FAMILY MEDICINE CLINIC | Facility: CLINIC | Age: 46
End: 2021-06-03
Payer: COMMERCIAL

## 2021-06-03 DIAGNOSIS — E53.8 B12 DEFICIENCY: ICD-10-CM

## 2021-06-03 PROCEDURE — 96372 THER/PROPH/DIAG INJ SC/IM: CPT | Performed by: FAMILY MEDICINE

## 2021-06-03 RX ADMIN — CYANOCOBALAMIN 1000 MCG: 1000 INJECTION INTRAMUSCULAR; SUBCUTANEOUS at 16:12

## 2021-06-10 ENCOUNTER — TELEPHONE (OUTPATIENT)
Dept: FAMILY MEDICINE CLINIC | Facility: CLINIC | Age: 46
End: 2021-06-10

## 2021-06-10 DIAGNOSIS — R20.2 PARESTHESIA: ICD-10-CM

## 2021-06-10 NOTE — TELEPHONE ENCOUNTER
Patient called regarding her Gabapentin 100 mg  Capsules, she take 3 capsules and a month supply is 90 capsules not 60 capsules that was sent to her pharmacy  She did pickup the prescription but it's not going to last her 1 month  Can you send in a revised script for the Gabapentin

## 2021-06-14 DIAGNOSIS — R20.2 PARESTHESIA: ICD-10-CM

## 2021-06-14 RX ORDER — GABAPENTIN 100 MG/1
CAPSULE ORAL
Qty: 90 CAPSULE | Refills: 5 | Status: CANCELLED | OUTPATIENT
Start: 2021-06-14

## 2021-06-14 RX ORDER — GABAPENTIN 100 MG/1
CAPSULE ORAL
Qty: 90 CAPSULE | Refills: 0 | Status: SHIPPED | OUTPATIENT
Start: 2021-06-14 | End: 2021-07-11 | Stop reason: SDUPTHER

## 2021-06-14 NOTE — TELEPHONE ENCOUNTER
Pt called back today asking if this could be done asap  She only has two days worth of medications  Qty needs to be changed Pt takes three times a day   Qty of 90 for a #30 day supply

## 2021-07-02 ENCOUNTER — CLINICAL SUPPORT (OUTPATIENT)
Dept: FAMILY MEDICINE CLINIC | Facility: CLINIC | Age: 46
End: 2021-07-02
Payer: COMMERCIAL

## 2021-07-02 DIAGNOSIS — E53.8 B12 DEFICIENCY: ICD-10-CM

## 2021-07-02 PROCEDURE — 96372 THER/PROPH/DIAG INJ SC/IM: CPT

## 2021-07-02 RX ADMIN — CYANOCOBALAMIN 1000 MCG: 1000 INJECTION INTRAMUSCULAR; SUBCUTANEOUS at 14:45

## 2021-07-09 DIAGNOSIS — R20.2 PARESTHESIA: ICD-10-CM

## 2021-07-09 RX ORDER — GABAPENTIN 100 MG/1
CAPSULE ORAL
Qty: 90 CAPSULE | Refills: 0 | OUTPATIENT
Start: 2021-07-09

## 2021-07-11 DIAGNOSIS — R20.2 PARESTHESIA: ICD-10-CM

## 2021-07-11 RX ORDER — GABAPENTIN 100 MG/1
CAPSULE ORAL
Qty: 90 CAPSULE | Refills: 1 | Status: SHIPPED | OUTPATIENT
Start: 2021-07-11 | End: 2021-07-12 | Stop reason: SDUPTHER

## 2021-07-12 DIAGNOSIS — R20.2 PARESTHESIA: ICD-10-CM

## 2021-07-12 RX ORDER — GABAPENTIN 100 MG/1
CAPSULE ORAL
Qty: 90 CAPSULE | Refills: 1 | Status: SHIPPED | OUTPATIENT
Start: 2021-07-12 | End: 2021-09-07

## 2021-08-03 ENCOUNTER — CLINICAL SUPPORT (OUTPATIENT)
Dept: FAMILY MEDICINE CLINIC | Facility: CLINIC | Age: 46
End: 2021-08-03
Payer: COMMERCIAL

## 2021-08-03 VITALS — TEMPERATURE: 97.6 F

## 2021-08-03 DIAGNOSIS — E53.8 B12 DEFICIENCY: ICD-10-CM

## 2021-08-03 PROCEDURE — 96372 THER/PROPH/DIAG INJ SC/IM: CPT

## 2021-08-03 RX ADMIN — CYANOCOBALAMIN 1000 MCG: 1000 INJECTION INTRAMUSCULAR; SUBCUTANEOUS at 16:23

## 2021-09-02 ENCOUNTER — CLINICAL SUPPORT (OUTPATIENT)
Dept: FAMILY MEDICINE CLINIC | Facility: CLINIC | Age: 46
End: 2021-09-02
Payer: COMMERCIAL

## 2021-09-02 VITALS — TEMPERATURE: 96.9 F

## 2021-09-02 DIAGNOSIS — E53.8 B12 DEFICIENCY: ICD-10-CM

## 2021-09-02 PROCEDURE — 96372 THER/PROPH/DIAG INJ SC/IM: CPT | Performed by: FAMILY MEDICINE

## 2021-09-02 RX ADMIN — CYANOCOBALAMIN 1000 MCG: 1000 INJECTION INTRAMUSCULAR; SUBCUTANEOUS at 07:46

## 2021-09-07 DIAGNOSIS — R20.2 PARESTHESIA: ICD-10-CM

## 2021-09-07 RX ORDER — GABAPENTIN 100 MG/1
CAPSULE ORAL
Qty: 90 CAPSULE | Refills: 1 | Status: SHIPPED | OUTPATIENT
Start: 2021-09-07 | End: 2021-11-08

## 2021-09-26 ENCOUNTER — APPOINTMENT (OUTPATIENT)
Dept: LAB | Facility: HOSPITAL | Age: 46
End: 2021-09-26
Payer: COMMERCIAL

## 2021-09-26 DIAGNOSIS — R10.84 GENERALIZED ABDOMINAL PAIN: ICD-10-CM

## 2021-09-26 DIAGNOSIS — E89.0 POSTOPERATIVE HYPOTHYROIDISM: ICD-10-CM

## 2021-09-26 DIAGNOSIS — E53.8 FOLATE DEFICIENCY: ICD-10-CM

## 2021-09-26 DIAGNOSIS — E53.8 B12 DEFICIENCY: ICD-10-CM

## 2021-09-26 DIAGNOSIS — E53.1 VITAMIN B6 DEFICIENCY: ICD-10-CM

## 2021-09-26 LAB
ALBUMIN SERPL BCP-MCNC: 3.4 G/DL (ref 3.5–5)
ALP SERPL-CCNC: 109 U/L (ref 46–116)
ALT SERPL W P-5'-P-CCNC: 17 U/L (ref 12–78)
AMYLASE SERPL-CCNC: 63 IU/L (ref 25–115)
ANION GAP SERPL CALCULATED.3IONS-SCNC: 7 MMOL/L (ref 4–13)
AST SERPL W P-5'-P-CCNC: 12 U/L (ref 5–45)
BASOPHILS # BLD AUTO: 0.08 THOUSANDS/ΜL (ref 0–0.1)
BASOPHILS NFR BLD AUTO: 1 % (ref 0–1)
BILIRUB SERPL-MCNC: 0.3 MG/DL (ref 0.2–1)
BUN SERPL-MCNC: 6 MG/DL (ref 5–25)
CALCIUM ALBUM COR SERPL-MCNC: 9.1 MG/DL (ref 8.3–10.1)
CALCIUM SERPL-MCNC: 8.6 MG/DL (ref 8.3–10.1)
CHLORIDE SERPL-SCNC: 108 MMOL/L (ref 100–108)
CHOLEST SERPL-MCNC: 149 MG/DL (ref 50–200)
CO2 SERPL-SCNC: 27 MMOL/L (ref 21–32)
CREAT SERPL-MCNC: 0.81 MG/DL (ref 0.6–1.3)
EOSINOPHIL # BLD AUTO: 0.22 THOUSAND/ΜL (ref 0–0.61)
EOSINOPHIL NFR BLD AUTO: 2 % (ref 0–6)
ERYTHROCYTE [DISTWIDTH] IN BLOOD BY AUTOMATED COUNT: 18.6 % (ref 11.6–15.1)
FOLATE SERPL-MCNC: 3.1 NG/ML (ref 3.1–17.5)
GFR SERPL CREATININE-BSD FRML MDRD: 87 ML/MIN/1.73SQ M
GLUCOSE P FAST SERPL-MCNC: 104 MG/DL (ref 65–99)
HCT VFR BLD AUTO: 36.8 % (ref 34.8–46.1)
HDLC SERPL-MCNC: 29 MG/DL
HGB BLD-MCNC: 10.6 G/DL (ref 11.5–15.4)
IMM GRANULOCYTES # BLD AUTO: 0.01 THOUSAND/UL (ref 0–0.2)
IMM GRANULOCYTES NFR BLD AUTO: 0 % (ref 0–2)
LDLC SERPL CALC-MCNC: 90 MG/DL (ref 0–100)
LIPASE SERPL-CCNC: 82 U/L (ref 73–393)
LYMPHOCYTES # BLD AUTO: 2.29 THOUSANDS/ΜL (ref 0.6–4.47)
LYMPHOCYTES NFR BLD AUTO: 25 % (ref 14–44)
MCH RBC QN AUTO: 20.4 PG (ref 26.8–34.3)
MCHC RBC AUTO-ENTMCNC: 28.8 G/DL (ref 31.4–37.4)
MCV RBC AUTO: 71 FL (ref 82–98)
MONOCYTES # BLD AUTO: 0.68 THOUSAND/ΜL (ref 0.17–1.22)
MONOCYTES NFR BLD AUTO: 8 % (ref 4–12)
NEUTROPHILS # BLD AUTO: 5.74 THOUSANDS/ΜL (ref 1.85–7.62)
NEUTS SEG NFR BLD AUTO: 64 % (ref 43–75)
NONHDLC SERPL-MCNC: 120 MG/DL
NRBC BLD AUTO-RTO: 0 /100 WBCS
PLATELET # BLD AUTO: 355 THOUSANDS/UL (ref 149–390)
PMV BLD AUTO: 10.4 FL (ref 8.9–12.7)
POTASSIUM SERPL-SCNC: 4.2 MMOL/L (ref 3.5–5.3)
PROT SERPL-MCNC: 7.2 G/DL (ref 6.4–8.2)
RBC # BLD AUTO: 5.2 MILLION/UL (ref 3.81–5.12)
SODIUM SERPL-SCNC: 142 MMOL/L (ref 136–145)
TRIGL SERPL-MCNC: 152 MG/DL
TSH SERPL DL<=0.05 MIU/L-ACNC: 1.29 UIU/ML (ref 0.36–3.74)
VIT B12 SERPL-MCNC: 788 PG/ML (ref 100–900)
WBC # BLD AUTO: 9.02 THOUSAND/UL (ref 4.31–10.16)

## 2021-09-26 PROCEDURE — 84207 ASSAY OF VITAMIN B-6: CPT

## 2021-09-26 PROCEDURE — 82746 ASSAY OF FOLIC ACID SERUM: CPT

## 2021-09-26 PROCEDURE — 80053 COMPREHEN METABOLIC PANEL: CPT

## 2021-09-26 PROCEDURE — 85025 COMPLETE CBC W/AUTO DIFF WBC: CPT

## 2021-09-26 PROCEDURE — 82150 ASSAY OF AMYLASE: CPT

## 2021-09-26 PROCEDURE — 83690 ASSAY OF LIPASE: CPT

## 2021-09-26 PROCEDURE — 82607 VITAMIN B-12: CPT

## 2021-09-26 PROCEDURE — 36415 COLL VENOUS BLD VENIPUNCTURE: CPT

## 2021-09-26 PROCEDURE — 80061 LIPID PANEL: CPT

## 2021-09-26 PROCEDURE — 84443 ASSAY THYROID STIM HORMONE: CPT

## 2021-09-27 ENCOUNTER — OFFICE VISIT (OUTPATIENT)
Dept: FAMILY MEDICINE CLINIC | Facility: CLINIC | Age: 46
End: 2021-09-27
Payer: COMMERCIAL

## 2021-09-27 DIAGNOSIS — D50.9 IRON DEFICIENCY ANEMIA, UNSPECIFIED IRON DEFICIENCY ANEMIA TYPE: ICD-10-CM

## 2021-09-27 DIAGNOSIS — R73.01 ELEVATED FASTING BLOOD SUGAR: ICD-10-CM

## 2021-09-27 DIAGNOSIS — E89.0 POSTOPERATIVE HYPOTHYROIDISM: Primary | ICD-10-CM

## 2021-09-27 PROCEDURE — 99213 OFFICE O/P EST LOW 20 MIN: CPT | Performed by: FAMILY MEDICINE

## 2021-10-02 LAB — VIT B6 SERPL-MCNC: 3.3 UG/L (ref 2–32.8)

## 2021-10-03 VITALS
WEIGHT: 218.2 LBS | BODY MASS INDEX: 31.24 KG/M2 | SYSTOLIC BLOOD PRESSURE: 134 MMHG | TEMPERATURE: 97.9 F | HEIGHT: 70 IN | DIASTOLIC BLOOD PRESSURE: 84 MMHG

## 2021-10-05 ENCOUNTER — CLINICAL SUPPORT (OUTPATIENT)
Dept: FAMILY MEDICINE CLINIC | Facility: CLINIC | Age: 46
End: 2021-10-05
Payer: COMMERCIAL

## 2021-10-05 DIAGNOSIS — E53.8 B12 DEFICIENCY: ICD-10-CM

## 2021-10-05 PROCEDURE — 96372 THER/PROPH/DIAG INJ SC/IM: CPT | Performed by: FAMILY MEDICINE

## 2021-10-05 RX ADMIN — CYANOCOBALAMIN 1000 MCG: 1000 INJECTION INTRAMUSCULAR; SUBCUTANEOUS at 16:00

## 2021-11-02 ENCOUNTER — CLINICAL SUPPORT (OUTPATIENT)
Dept: FAMILY MEDICINE CLINIC | Facility: CLINIC | Age: 46
End: 2021-11-02
Payer: COMMERCIAL

## 2021-11-02 VITALS — TEMPERATURE: 98 F

## 2021-11-02 DIAGNOSIS — E53.8 B12 DEFICIENCY: ICD-10-CM

## 2021-11-02 PROCEDURE — 96372 THER/PROPH/DIAG INJ SC/IM: CPT | Performed by: FAMILY MEDICINE

## 2021-11-02 RX ADMIN — CYANOCOBALAMIN 1000 MCG: 1000 INJECTION INTRAMUSCULAR; SUBCUTANEOUS at 16:06

## 2021-11-07 DIAGNOSIS — R20.2 PARESTHESIA: ICD-10-CM

## 2021-11-08 RX ORDER — GABAPENTIN 100 MG/1
CAPSULE ORAL
Qty: 90 CAPSULE | Refills: 1 | Status: SHIPPED | OUTPATIENT
Start: 2021-11-08 | End: 2021-12-05 | Stop reason: SDUPTHER

## 2021-11-13 ENCOUNTER — APPOINTMENT (OUTPATIENT)
Dept: LAB | Facility: MEDICAL CENTER | Age: 46
End: 2021-11-13
Payer: COMMERCIAL

## 2021-11-13 DIAGNOSIS — E89.0 POSTOPERATIVE HYPOTHYROIDISM: ICD-10-CM

## 2021-11-13 DIAGNOSIS — R73.01 ELEVATED FASTING BLOOD SUGAR: ICD-10-CM

## 2021-11-13 DIAGNOSIS — D50.9 IRON DEFICIENCY ANEMIA, UNSPECIFIED IRON DEFICIENCY ANEMIA TYPE: ICD-10-CM

## 2021-11-13 LAB
ALBUMIN SERPL BCP-MCNC: 3.4 G/DL (ref 3.5–5)
ALP SERPL-CCNC: 105 U/L (ref 46–116)
ALT SERPL W P-5'-P-CCNC: 14 U/L (ref 12–78)
ANION GAP SERPL CALCULATED.3IONS-SCNC: 7 MMOL/L (ref 4–13)
AST SERPL W P-5'-P-CCNC: 10 U/L (ref 5–45)
BASOPHILS # BLD AUTO: 0.07 THOUSANDS/ΜL (ref 0–0.1)
BASOPHILS NFR BLD AUTO: 1 % (ref 0–1)
BILIRUB SERPL-MCNC: 0.44 MG/DL (ref 0.2–1)
BUN SERPL-MCNC: 9 MG/DL (ref 5–25)
CALCIUM ALBUM COR SERPL-MCNC: 10.1 MG/DL (ref 8.3–10.1)
CALCIUM SERPL-MCNC: 9.6 MG/DL (ref 8.3–10.1)
CHLORIDE SERPL-SCNC: 107 MMOL/L (ref 100–108)
CO2 SERPL-SCNC: 26 MMOL/L (ref 21–32)
CREAT SERPL-MCNC: 0.8 MG/DL (ref 0.6–1.3)
EOSINOPHIL # BLD AUTO: 0.2 THOUSAND/ΜL (ref 0–0.61)
EOSINOPHIL NFR BLD AUTO: 2 % (ref 0–6)
ERYTHROCYTE [DISTWIDTH] IN BLOOD BY AUTOMATED COUNT: 19.2 % (ref 11.6–15.1)
EST. AVERAGE GLUCOSE BLD GHB EST-MCNC: 117 MG/DL
FERRITIN SERPL-MCNC: 4 NG/ML (ref 8–388)
GFR SERPL CREATININE-BSD FRML MDRD: 89 ML/MIN/1.73SQ M
GLUCOSE P FAST SERPL-MCNC: 94 MG/DL (ref 65–99)
HBA1C MFR BLD: 5.7 %
HCT VFR BLD AUTO: 37 % (ref 34.8–46.1)
HGB BLD-MCNC: 10.8 G/DL (ref 11.5–15.4)
IMM GRANULOCYTES # BLD AUTO: 0.03 THOUSAND/UL (ref 0–0.2)
IMM GRANULOCYTES NFR BLD AUTO: 0 % (ref 0–2)
LYMPHOCYTES # BLD AUTO: 2.12 THOUSANDS/ΜL (ref 0.6–4.47)
LYMPHOCYTES NFR BLD AUTO: 20 % (ref 14–44)
MCH RBC QN AUTO: 20.5 PG (ref 26.8–34.3)
MCHC RBC AUTO-ENTMCNC: 29.2 G/DL (ref 31.4–37.4)
MCV RBC AUTO: 70 FL (ref 82–98)
MONOCYTES # BLD AUTO: 0.81 THOUSAND/ΜL (ref 0.17–1.22)
MONOCYTES NFR BLD AUTO: 8 % (ref 4–12)
NEUTROPHILS # BLD AUTO: 7.55 THOUSANDS/ΜL (ref 1.85–7.62)
NEUTS SEG NFR BLD AUTO: 69 % (ref 43–75)
NRBC BLD AUTO-RTO: 0 /100 WBCS
PLATELET # BLD AUTO: 348 THOUSANDS/UL (ref 149–390)
PMV BLD AUTO: 10.9 FL (ref 8.9–12.7)
POTASSIUM SERPL-SCNC: 3.9 MMOL/L (ref 3.5–5.3)
PROT SERPL-MCNC: 7.3 G/DL (ref 6.4–8.2)
RBC # BLD AUTO: 5.27 MILLION/UL (ref 3.81–5.12)
SODIUM SERPL-SCNC: 140 MMOL/L (ref 136–145)
WBC # BLD AUTO: 10.78 THOUSAND/UL (ref 4.31–10.16)

## 2021-11-13 PROCEDURE — 82728 ASSAY OF FERRITIN: CPT

## 2021-11-13 PROCEDURE — 80053 COMPREHEN METABOLIC PANEL: CPT

## 2021-11-13 PROCEDURE — 83036 HEMOGLOBIN GLYCOSYLATED A1C: CPT

## 2021-11-13 PROCEDURE — 85025 COMPLETE CBC W/AUTO DIFF WBC: CPT

## 2021-11-13 PROCEDURE — 36415 COLL VENOUS BLD VENIPUNCTURE: CPT

## 2021-12-05 DIAGNOSIS — R20.2 PARESTHESIA: ICD-10-CM

## 2021-12-05 DIAGNOSIS — E89.0 POSTOPERATIVE HYPOTHYROIDISM: ICD-10-CM

## 2021-12-06 RX ORDER — LEVOTHYROXINE SODIUM 0.2 MG/1
200 TABLET ORAL DAILY
Qty: 90 TABLET | Refills: 0 | Status: SHIPPED | OUTPATIENT
Start: 2021-12-06 | End: 2022-02-28

## 2021-12-06 RX ORDER — GABAPENTIN 100 MG/1
CAPSULE ORAL
Qty: 90 CAPSULE | Refills: 0 | Status: SHIPPED | OUTPATIENT
Start: 2021-12-06 | End: 2022-01-10

## 2021-12-06 RX ORDER — LEVOTHYROXINE SODIUM 0.05 MG/1
50 TABLET ORAL DAILY
Qty: 90 TABLET | Refills: 0 | Status: SHIPPED | OUTPATIENT
Start: 2021-12-06 | End: 2022-02-28

## 2021-12-07 ENCOUNTER — CLINICAL SUPPORT (OUTPATIENT)
Dept: FAMILY MEDICINE CLINIC | Facility: CLINIC | Age: 46
End: 2021-12-07
Payer: COMMERCIAL

## 2021-12-07 DIAGNOSIS — E53.8 B12 DEFICIENCY: ICD-10-CM

## 2021-12-07 PROCEDURE — 96372 THER/PROPH/DIAG INJ SC/IM: CPT | Performed by: FAMILY MEDICINE

## 2021-12-07 RX ADMIN — CYANOCOBALAMIN 1000 MCG: 1000 INJECTION INTRAMUSCULAR; SUBCUTANEOUS at 16:21

## 2022-01-04 ENCOUNTER — CLINICAL SUPPORT (OUTPATIENT)
Dept: FAMILY MEDICINE CLINIC | Facility: CLINIC | Age: 47
End: 2022-01-04
Payer: COMMERCIAL

## 2022-01-04 DIAGNOSIS — E53.8 B12 DEFICIENCY: ICD-10-CM

## 2022-01-04 PROCEDURE — 96372 THER/PROPH/DIAG INJ SC/IM: CPT

## 2022-01-04 RX ADMIN — CYANOCOBALAMIN 1000 MCG: 1000 INJECTION INTRAMUSCULAR; SUBCUTANEOUS at 16:17

## 2022-02-01 ENCOUNTER — OFFICE VISIT (OUTPATIENT)
Dept: FAMILY MEDICINE CLINIC | Facility: CLINIC | Age: 47
End: 2022-02-01
Payer: COMMERCIAL

## 2022-02-01 VITALS
HEIGHT: 70 IN | WEIGHT: 224.6 LBS | TEMPERATURE: 97.1 F | BODY MASS INDEX: 32.16 KG/M2 | OXYGEN SATURATION: 98 % | SYSTOLIC BLOOD PRESSURE: 124 MMHG | HEART RATE: 72 BPM | RESPIRATION RATE: 16 BRPM | DIASTOLIC BLOOD PRESSURE: 70 MMHG

## 2022-02-01 DIAGNOSIS — M54.32 LEFT SCIATIC NERVE PAIN: Primary | ICD-10-CM

## 2022-02-01 PROCEDURE — 99213 OFFICE O/P EST LOW 20 MIN: CPT | Performed by: NURSE PRACTITIONER

## 2022-02-01 RX ORDER — METHOCARBAMOL 500 MG/1
500 TABLET, FILM COATED ORAL 3 TIMES DAILY PRN
Qty: 15 TABLET | Refills: 0 | Status: SHIPPED | OUTPATIENT
Start: 2022-02-01 | End: 2022-02-14

## 2022-02-01 NOTE — PROGRESS NOTES
Assessment/Plan:   Diagnosis ICD-10-CM Associated Orders   1  Left sciatic nerve pain  M54 32 methocarbamol (ROBAXIN) 500 mg tablet   Use advil 400 mg every 6-8 hours per day for 3-4 days  Take with food  Use muscle relaxant as needed up to every 8 hours  This medication may cause drowsiness  Do not drive on this medication  Do not drink alcohol while taking this medication  Do not mix with other medications that may cause drowsiness  Lower back stretching as tolerated  Advised to call the office for any worsening of symptoms or no symptom improvement  Patient verbalizes understand and agrees with treatment plan  Diagnoses and all orders for this visit:    Left sciatic nerve pain  -     methocarbamol (ROBAXIN) 500 mg tablet; Take 1 tablet (500 mg total) by mouth 3 (three) times a day as needed for muscle spasms                Subjective:        Patient ID: Crystal Reyna is a 51 y o  female  Chief Complaint   Patient presents with    Leg Pain     pt is here for left leg pain that comes and goes for about a week and a half        Here for evaluation of left leg pain  About 1  5 weeks ago took a step with the right leg and felt a pull in the left side and now has intermittent left leg pain  Pain with walking and worse with going up steps and lifting her left leg  No numbness or tingling  Tried advil without any benefit  She does feel mild lower back pain but this is chronic and unchanged  Has never completed PT for back pain  Pain scale on average throughout the day varies, if she's walking a lot it can be 7-8/10  Denies any change in bladder/bowel habits  No numbness  She was 17 days late for her period and did start spotting when this happened- she has appt with GYN tomorrow             The following portions of the patient's history were reviewed and updated as appropriate: allergies, current medications, past family history, past social history and problem list     Review of Systems Constitutional: Negative for chills and fever  Eyes: Negative for discharge  Respiratory: Negative for shortness of breath  Cardiovascular: Negative for chest pain  Gastrointestinal: Negative for constipation and diarrhea  Genitourinary: Negative for difficulty urinating  Musculoskeletal: Positive for arthralgias  Negative for joint swelling  Skin: Negative for rash  Neurological: Negative for headaches  Hematological: Negative for adenopathy  Psychiatric/Behavioral: The patient is not nervous/anxious  Objective:  /70   Pulse 72   Temp (!) 97 1 °F (36 2 °C) (Temporal)   Resp 16   Ht 5' 10" (1 778 m)   Wt 102 kg (224 lb 9 6 oz)   SpO2 98%   BMI 32 23 kg/m²      Physical Exam  Vitals and nursing note reviewed  Constitutional:       General: She is not in acute distress  Appearance: She is well-developed  She is obese  She is not diaphoretic  HENT:      Head: Normocephalic and atraumatic  Right Ear: External ear normal       Left Ear: External ear normal    Eyes:      General: Lids are normal          Right eye: No discharge  Left eye: No discharge  Conjunctiva/sclera: Conjunctivae normal    Cardiovascular:      Rate and Rhythm: Normal rate and regular rhythm  Heart sounds: No murmur heard  Pulmonary:      Effort: Pulmonary effort is normal  No respiratory distress  Breath sounds: Normal breath sounds  No wheezing  Musculoskeletal:         General: No deformity  Cervical back: Neck supple  Legs:       Comments: +5 strength BLLE, no edema, ROM intact, negative straight leg   Skin:     General: Skin is warm and dry  Neurological:      Mental Status: She is alert and oriented to person, place, and time  Psychiatric:         Speech: Speech normal          Behavior: Behavior normal          Thought Content: Thought content normal          Judgment: Judgment normal            BMI Counseling: Body mass index is 32 23 kg/m²  The BMI is above normal  Nutrition recommendations include limiting drinks that contain sugar and reducing intake of cholesterol  Exercise recommendations include exercising 3-5 times per week and strength training exercises  Rationale for BMI follow-up plan is due to patient being overweight or obese           Current Outpatient Medications:     Cyanocobalamin (B-12) 1000 MCG/ML KIT, Inject as directed every 30 (thirty) days, Disp: , Rfl:     gabapentin (NEURONTIN) 100 mg capsule, TAKE 3 CAPSULES BY MOUTH AT BEDTIME, Disp: 90 capsule, Rfl: 0    levothyroxine 200 mcg tablet, Take 1 tablet (200 mcg total) by mouth daily, Disp: 90 tablet, Rfl: 0    levothyroxine 50 mcg tablet, Take 1 tablet (50 mcg total) by mouth daily, Disp: 90 tablet, Rfl: 0    FOLIC ACID PO, Take by mouth daily (Patient not taking: Reported on 2/1/2022 ), Disp: , Rfl:     methocarbamol (ROBAXIN) 500 mg tablet, Take 1 tablet (500 mg total) by mouth 3 (three) times a day as needed for muscle spasms, Disp: 15 tablet, Rfl: 0    Current Facility-Administered Medications:     cyanocobalamin injection 1,000 mcg, 1,000 mcg, Intramuscular, Q30 Days, DEE Hardy, 1,000 mcg at 12/07/21 1621    cyanocobalamin injection 1,000 mcg, 1,000 mcg, Intramuscular, L50 Days, Marleny Cherry DO, 8,368 mcg at 01/04/22 1617  Allergies   Allergen Reactions    Lamisil [Terbinafine]     Prednisone      Rapid heart beat and palpitations

## 2022-02-01 NOTE — PATIENT INSTRUCTIONS
Use advil 400 mg every 6-8 hours per day for 3-4 days  Take with food  Use muscle relaxant as needed up to every 8 hours  This medication may cause drowsiness  Do not drive on this medication  Do not drink alcohol while taking this medication  Do not mix with other medications that may cause drowsiness  Lower back stretching as tolerated  Please call the office if you are experiencing any worsening of symptoms or no symptom improvement  Lower Back Exercises   AMBULATORY CARE:   Lower back exercises  help heal and strengthen your back muscles to prevent another injury  Ask your healthcare provider if you need to see a physical therapist for more advanced exercises  Seek care immediately if:   · You have severe pain that prevents you from moving  Contact your healthcare provider if:   · Your pain becomes worse  · You have new pain  · You have questions or concerns about your condition or care  Do lower back exercises safely:   · Do the exercises on a mat or firm surface  (not on a bed) to support your spine and prevent low back pain  · Move slowly and smoothly  Avoid fast or jerky motions  · Breathe normally  Do not hold your breath  · Stop if you feel pain  It is normal to feel some discomfort at first  Regular exercise will help decrease your discomfort over time  Lower back exercises: Your healthcare provider may recommend that you do back exercises 10 to 30 minutes each day  He may also recommend that you do exercises 1 to 3 times each day  Ask your healthcare provider which exercises are best for you and how often to do them  · Ankle pumps:  Lie on your back  Move your foot up (with your toes pointing toward your head)  Then, move your foot down (with your toes pointing away from you)  Repeat this exercise 10 times on each side  · Heel slides:  Lie on your back  Slowly bend one leg and then straighten it  Next, bend the other leg and then straighten it  Repeat 10 times on each side  · Pelvic tilt:  Lie on your back with your knees bent and feet flat on the floor  Place your arms in a relaxed position beside your body  Tighten the muscles of your abdomen and flatten your back against the floor  Hold for 5 seconds  Repeat 5 times  · Back stretch:  Lie on your back with your hands behind your head  Bend your knees and turn the lower half of your body to one side  Hold this position for 10 seconds  Repeat 3 times on each side  · Straight leg raises:  Lie on your back with one leg straight  Bend the other knee  Tighten your abdomen and then slowly lift the straight leg up about 6 to 12 inches off the floor  Hold for 1 to 5 seconds  Lower your leg slowly  Repeat 10 times on each leg  · Knee-to-chest:  Lie on your back with your knees bent and feet flat on the floor  Pull one of your knees toward your chest and hold it there for 5 seconds  Return your leg to the starting position  Lift the other knee toward your chest and hold for 5 seconds  Do this 5 times on each side  · Cat and camel:  Place your hands and knees on the floor  Arch your back upward toward the ceiling and lower your head  Round out your spine as much as you can  Hold for 5 seconds  Lift your head upward and push your chest downward toward the floor  Hold for 5 seconds  Do 3 sets or as directed  · Wall squats:  Stand with your back against a wall  Tighten the muscles of your abdomen  Slowly lower your body until your knees are bent at a 45 degree angle  Hold this position for 5 seconds  Slowly move back up to a standing position  Repeat 10 times  · Curl up:  Lie on your back with your knees bent and feet flat on the floor  Place your hands, palms down, underneath the curve in your lower back  Next, with your elbows on the floor, lift your shoulders and chest 2 to 3 inches  Keep your head in line with your shoulders  Hold this position for 5 seconds  When you can do this exercise without pain for 10 to 15 seconds, you may add a rotation  While your shoulders and chest are lifted off the ground, turn slightly to the left and hold  Repeat on the other side  · Bird dog:  Place your hands and knees on the floor  Keep your wrists directly below your shoulders and your knees directly below your hips  Pull your belly button in toward your spine  Do not flatten or arch your back  Tighten your abdominal muscles  Raise one arm straight out so that it is aligned with your head  Next, raise the leg opposite your arm  Hold this position for 15 seconds  Lower your arm and leg slowly and change sides  Do 5 sets  © Copyright [employer] 2021 Information is for End User's use only and may not be sold, redistributed or otherwise used for commercial purposes  All illustrations and images included in CareNotes® are the copyrighted property of [employer]  or Luli Epps   The above information is an  only  It is not intended as medical advice for individual conditions or treatments  Talk to your doctor, nurse or pharmacist before following any medical regimen to see if it is safe and effective for you

## 2022-02-02 ENCOUNTER — APPOINTMENT (OUTPATIENT)
Dept: LAB | Facility: MEDICAL CENTER | Age: 47
End: 2022-02-02
Payer: COMMERCIAL

## 2022-02-02 ENCOUNTER — OFFICE VISIT (OUTPATIENT)
Dept: OBGYN CLINIC | Facility: MEDICAL CENTER | Age: 47
End: 2022-02-02
Payer: COMMERCIAL

## 2022-02-02 VITALS
HEIGHT: 70 IN | BODY MASS INDEX: 32.21 KG/M2 | DIASTOLIC BLOOD PRESSURE: 68 MMHG | WEIGHT: 225 LBS | SYSTOLIC BLOOD PRESSURE: 120 MMHG

## 2022-02-02 DIAGNOSIS — N93.9 ABNORMAL UTERINE BLEEDING (AUB): Primary | ICD-10-CM

## 2022-02-02 DIAGNOSIS — N93.9 ABNORMAL UTERINE BLEEDING (AUB): ICD-10-CM

## 2022-02-02 DIAGNOSIS — Z12.31 BREAST CANCER SCREENING BY MAMMOGRAM: ICD-10-CM

## 2022-02-02 LAB
FSH SERPL-ACNC: 4 MIU/ML
LH SERPL-ACNC: 3.8 MIU/ML
PROLACTIN SERPL-MCNC: 20 NG/ML
T4 FREE SERPL-MCNC: 1.52 NG/DL (ref 0.76–1.46)
TSH SERPL DL<=0.05 MIU/L-ACNC: 0.12 UIU/ML (ref 0.36–3.74)

## 2022-02-02 PROCEDURE — 84443 ASSAY THYROID STIM HORMONE: CPT

## 2022-02-02 PROCEDURE — 99213 OFFICE O/P EST LOW 20 MIN: CPT | Performed by: NURSE PRACTITIONER

## 2022-02-02 PROCEDURE — 84439 ASSAY OF FREE THYROXINE: CPT

## 2022-02-02 PROCEDURE — 83002 ASSAY OF GONADOTROPIN (LH): CPT

## 2022-02-02 PROCEDURE — 36415 COLL VENOUS BLD VENIPUNCTURE: CPT

## 2022-02-02 PROCEDURE — 84146 ASSAY OF PROLACTIN: CPT

## 2022-02-02 PROCEDURE — 83001 ASSAY OF GONADOTROPIN (FSH): CPT

## 2022-02-02 PROCEDURE — 4004F PT TOBACCO SCREEN RCVD TLK: CPT | Performed by: NURSE PRACTITIONER

## 2022-02-02 NOTE — PATIENT INSTRUCTIONS
Thank you for visiting [facility]  You may be invited to complete a survey about you visit  Your responses will help us improve care we provide  A 10 means the care you received at your visit met your expectations  If you are unable to give a 10 please list reasons so we can work on improving your patient experience  Norethindrone (By mouth)   Norethindrone (nor-ETH-in-drone)  Prevents pregnancy  Also treats menstrual problems and endometriosis  This medicine is commonly called a birth control pill  Brand Name(s): Aygestin, Orquidea, Deblitane, Mela, ORLÉANS, [place], [place], [place], [place], Tracee-BE, Villalba, Norlyroc, Ortho Micronor, [place], [place]   There may be other brand names for this medicine  When This Medicine Should Not Be Used: This medicine is not right for everyone  Do not use it if you had an allergic reaction to a progestin drug or if you are pregnant  Do not use it if you have liver disease, liver tumors, or a history of blood clots, stroke, heart attack, or breast cancer  How to Use This Medicine:   Tablet  · Your doctor will tell you how much medicine to use  Do not use more than directed  Different brands of birth control pills have different instructions for when to start  Ask your doctor or pharmacist if you do not understand what day to start taking your brand  · You may take this medicine with food or milk if it upsets your stomach  · Keep your pills in the container you receive from the pharmacy  Take the pills in the order they appear in the container  · Read and follow the patient instructions that come with this medicine  Talk to your doctor or pharmacist if you have any questions  · Take your pill at the same time every day, even during your menstrual period  · Take a dose as soon as you remember  If it is almost time for your next dose, wait until then and take a regular dose  Do not take extra medicine to make up for a missed dose   If you take a pill more than 3 hours late, use another form of birth control for the next 48 hours  · Store the pills in the original package, away from heat, moisture, and direct light  Drugs and Foods to Avoid:   Ask your doctor or pharmacist before using any other medicine, including over-the-counter medicines, vitamins, and herbal products  · Some foods and medicines can affect how birth control pills work  Tell your doctor if you are also taking any of the following:  ? Bromocriptine, rifampin, Cassidy's wort, bosentan, griseofulvin  ? Antibiotic  ? Seizure medicine, including phenytoin, carbamazepine, felbamate, topiramate  ? Protease inhibitor that treats HIV/AIDS  ? Barbiturate (used to treat seizures, anxiety, or insomnia)  Warnings While Using This Medicine:   · Tell your doctor right away if you think you have become pregnant  If you miss two periods in a row, call your doctor for a pregnancy test before you take any more pills  · Tell your doctor if you are breastfeeding or if you have kidney disease, lupus, high blood pressure, high cholesterol, epilepsy, asthma, migraine headaches, diabetes, or a history of depression  · This medicine may cause the following problems:  ? Ectopic (tubal) pregnancy  ? Ovarian cysts that might twist or break  ? Possible risk of breast cancer  ? Benign liver tumor  ? Blood clots, which may lead to stroke or heart attack  · You might have spotting or irregular bleeding when you first start to use this medicine  You might have unplanned bleeding if you miss a dose or are late taking it  Call your doctor if you think there is a problem, such as if you have heavy bleeding  · This medicine will not protect you from HIV/AIDS or other sexually transmitted diseases  · Use a second form of birth control during the first 3 weeks to make sure you are protected from pregnancy  · Smoking increases your risk of heart attack, stroke, or blood clot while using this medicine   Talk to your doctor about these risks  · Tell any doctor or dentist who treats you that you are using this medicine  This medicine may affect certain medical test results  · Keep all medicine out of the reach of children  Never share your medicine with anyone  Possible Side Effects While Using This Medicine:   Call your doctor right away if you notice any of these side effects:  · Allergic reaction: Itching or hives, swelling in your face or hands, swelling or tingling in your mouth or throat, chest tightness, trouble breathing  · Chest pain, trouble breathing, or coughing up blood  · Numbness or weakness on one side of your body, sudden or severe headache, problems with vision, speech, or walking  · Pain in your lower abdomen  · Severe headache, sensitivity to light or sound, nausea or vomiting  · Trouble seeing, double vision, or other eye problems  · Yellow skin or eyes  If you notice these less serious side effects, talk with your doctor:   · Breast tenderness or swelling  · Headache  · Light spotting or bleeding between periods  · Nausea  If you notice other side effects that you think are caused by this medicine, tell your doctor  Call your doctor for medical advice about side effects  You may report side effects to FDA at 3-200-FDA-1493    © Copyright [employer] 2021 Information is for End User's use only and may not be sold, redistributed or otherwise used for commercial purposes  The above information is an  only  It is not intended as medical advice for individual conditions or treatments  Talk to your doctor, nurse or pharmacist before following any medical regimen to see if it is safe and effective for you  Menopause   WHAT YOU NEED TO KNOW:   What is menopause? Menopause is a normal stage in a woman's life when her monthly periods stop  A woman who has not had a period for a full year after the age of 36 is considered to be in menopause  Menopause usually occurs between ages 52 to 48  Perimenopause is a stage before menopause that may cause signs and symptoms similar to menopause  Perimenopause may start about 4 years before menopause  What causes menopause? Menopause starts when the ovaries stop making the female hormones estrogen and progesterone  After menopause, a woman is no longer able to become pregnant  Any of the following may trigger menopause or early menopause:  · Older age    · Surgery, including a hysterectomy or oophorectomy    · Family history of early menopause    · Smoking    · Chemotherapy or pelvic radiation    · Chromosome abnormalities, including Pal syndrome and Fragile X syndrome    What are the signs and symptoms of menopause? The signs and symptoms of menopause can be different from woman to woman:  · Irregular menstrual cycles with heavy vaginal bleeding followed by decreased bleeding until it stops    · Hot flashes (feeling warm, flushed, and sweaty)     · Vaginal changes such as increased dryness     · Mood changes such as anxiety, depression, or decreased desire to have sex    · Trouble sleeping, joint pain, headaches    · Brittle nails, hair on chin or chest where it is normally absent    · Decrease in breast size and change in skin texture    What do I need to know about menopause? · You can still get pregnant while you have periods  Continue to use birth control if you do not want to get pregnant  You may need to use birth control until it has been 1 year since your periods stopped  · Hormone replacement therapy (HRT) can be used to treat symptoms of menopause  HRT is medicine that replaces your low hormone levels  HRT contains estrogen and sometimes progestin  HRT has benefits and risks  HRT decreases your risk for bone fractures by helping to prevent osteoporosis  HRT also protects you from colon cancer  HRT may increase your risk for breast cancer, blood clots, heart disease, and stroke  Ask your healthcare provider if HRT is right for you      How can I care for myself? · Manage hot flashes  Hot flashes are brief periods of feeling very warm, flushed, and sweaty  Hot flashes can last from a few seconds to several minutes  They may happen many times during the day, and are common at night  Layer your clothing so that you can easily remove some clothing and cool yourself during a hot flash  Cold drinks may also be helpful  · Reduce vaginal dryness  by using over-the-counter vaginal creams  Vaginal dryness may cause you to have pain or discomfort during sex  Only use creams that are made for vaginal use  Do  not  use petroleum jelly  You may need an estrogen cream to put in and around your vagina  Estrogen cream may help decrease vaginal dryness and lower your risk of vaginal infections  · Continue to use birth control  during perimenopause if you do not want to get pregnant  You may need to use birth control until it has been 1 year since your periods stopped  Ask your healthcare provider when you can stop using birth control to prevent pregnancy  How can I live a healthy lifestyle during and after menopause? After menopause, your risk for heart disease and bone loss increases  Ask about these and other ways to stay healthy:  · Exercise regularly  Exercise helps you maintain a healthy weight  Exercise can also help to control your blood pressure and cholesterol levels  Include weight-bearing exercise for strong bones  Weight bearing exercise is recommended for at least 30 minutes, 3 times a week  Ask your healthcare provider about the best exercise plan for you  · Eat a variety of healthy foods  Include fruits, vegetables, whole grains (whole-wheat bread, pasta, and cereals), low-fat dairy, and lean protein foods (beans, poultry, and fish)  Limit foods high in sodium (salt)  Ask your healthcare provider for more information about a meal plan that is right for you  · Do not smoke  If you smoke, it is never too late to quit   You are more likely to have a heart attack, lung disease, blood clots, and cancer if you smoke  Ask your healthcare provider for information if you need help quitting  · Take supplements as directed  You may need extra calcium and vitamin D to help prevent osteoporosis  · Limit alcohol and caffeine  Alcohol and caffeine may worsen your symptoms  When should I contact my healthcare provider? · You have vaginal bleeding after menopause  · You have questions or concerns about your condition or care  CARE AGREEMENT:   You have the right to help plan your care  Learn about your health condition and how it may be treated  Discuss treatment options with your healthcare providers to decide what care you want to receive  You always have the right to refuse treatment  The above information is an  only  It is not intended as medical advice for individual conditions or treatments  Talk to your doctor, nurse or pharmacist before following any medical regimen to see if it is safe and effective for you  © Copyright [employer] 2021 Information is for End User's use only and may not be sold, redistributed or otherwise used for commercial purposes  All illustrations and images included in CareNotes® are the copyrighted property of [employer]  or [address] [place], [place] [address]  Abnormal (Dysfunctional) Uterine Bleeding   WHAT YOU NEED TO KNOW:   What is abnormal uterine bleeding (AUB)? AUB is uterine bleeding that is not usual for you  It may also be called dysfunctional uterine bleeding  You may have bleeding from your uterus at times other than your normal monthly period  Your monthly periods may last longer or shorter, and bleeding may be heavier or lighter than usual  AUB can be acute (lasting a short time) or chronic (lasting longer than 6 months)  What causes AUB?   The following can cause or increase your risk for AUB:  · Age 36 or older, or 15 or younger    · Too much or too little estrogen    · An ovary does not release an egg during ovulation    · A medical condition, such as polycystic ovary syndrome (PCOS), diabetes, or long-term liver or kidney disease    · Not giving birth    · A growth, such as a tumor or a polyp    · Trauma such as an injury to your uterus or cervix    · An infection such as a sexually transmitted infection    · An overactive or underactive thyroid or adrenal gland    · An eating disorder, such as bulimia or anorexia, or too much exercise    · Certain medications, or hormone replacement therapy    · A large weight gain or loss    What are the signs and symptoms of AUB? · Bleeding or spotting between periods    · Bleeding that starts 12 months or longer after you have been through menopause    · The amount of bleeding during your period is heavier or lighter than usual    · The number of days that you bleed during your regular period is longer than usual, or more than 7 days    · The number of days that you bleed is shorter than usual, or less than 2 days    · The time between your monthly periods is shorter or longer than usual    How is AUB diagnosed? Your healthcare provider will ask about your monthly periods  Tell him or her about all changes to your periods  Include when the changes started  Your provider may ask the age you were when you got your first period  He or she may ask if you use tampons or sanitary pads  Tell him or her about any medical conditions you have and all medicines you currently use  You may need any of the following, depending on your age and medical history:  · Blood tests  may be done to find the cause of your AUB and problems caused by AUB, such as anemia  Your provider may recommend screening for an STI as part of the blood tests  · A pelvic exam  may be done to find the source of your bleeding  · A hysteroscopy  is a procedure to look at your endometrium  The endometrium is the lining inside of your uterus   Your healthcare provider will insert a small tube with a camera at the end into your uterus  · A biopsy  is a procedure to remove a small piece of tissue from the endometrium  The tissue is sent to a lab for tests  · An ultrasound  uses sound waves to show pictures of your uterus, ovaries, tubes, and vagina on a monitor  · A pap smear  may be needed  Your healthcare provider takes a sample of tissue from your cervix and sends it to a lab for tests  How is AUB treated? · Medicines  may be given to treat a condition causing AUB  An example is medicine to increase or decrease the amount of hormone your thyroid makes  Hormones may be given to help decrease bleeding by making your monthly periods more regular  Sometimes this medicine may be given as birth control pills  Iron supplements may be given if your blood iron level decreases because of heavy bleeding  · Procedures  such as endometrial ablation or dilation and curettage may be used to control your bleeding  · Surgery  may be needed if medicines do not work or cannot be used  You may need an abdominal or vaginal hysterectomy  A hysterectomy is surgery to remove your uterus  How do I care for myself at home? · Include foods high in iron if needed  Examples of foods high in iron are leafy green vegetables, beef, pork, liver, eggs, and whole-grain breads and cereals  · Keep a diary of your menstrual cycles  Keep track of the number of tampons or pads you use each day  · Talk to your healthcare provider before you start a weight loss program   You may need to wait until the abnormal bleeding has stopped before you try to lose weight  The amount of iron in your blood should be normal before you lose weight  Ask your provider if weight loss will help your AUB  He or she can tell you what weight is healthy for you  He or she can help you create a safe weight loss plan, if needed  When should I seek immediate care?    · You continue to bleed heavily, or you feel faint       When should I call my doctor or gynecologist?   · You need to change your sanitary pad or tampon more than 1 time each hour  · Your medicine causes nausea, vomiting, or diarrhea  · You have questions or concerns about your condition or care  CARE AGREEMENT:   You have the right to help plan your care  Learn about your health condition and how it may be treated  Discuss treatment options with your healthcare providers to decide what care you want to receive  You always have the right to refuse treatment  The above information is an  only  It is not intended as medical advice for individual conditions or treatments  Talk to your doctor, nurse or pharmacist before following any medical regimen to see if it is safe and effective for you  © Copyright [employer] 2021 Information is for End User's use only and may not be sold, redistributed or otherwise used for commercial purposes   All illustrations and images included in CareNotes® are the copyrighted property of [employer]  or [address] [place] [address] [employer]

## 2022-02-02 NOTE — PROGRESS NOTES
Assessment/Plan:      Diagnoses and all orders for this visit:    Abnormal uterine bleeding (AUB)  -     TSH, 3rd generation with Free T4 reflex; Future  -     Follicle stimulating hormone; Future  -     Prolactin; Future  -     Luteinizing hormone; Future    Breast cancer screening by mammogram  -     Mammo screening bilateral w 3d & cad; Future        -reviewed with patient menopause is 12 consecutive months with no menses  -given hypothyroid TSH with reflex ordered  Additional labs LH, FSH and prolactin ordered  Will call with results  - reviewed possibly needs ultrasound and endometrial biopsy   -signs and symptoms report reviewed  -will call with results    If labs normal and bleeding continues recommend Pelvic US and EMB  Can then offer progesterone  Options     RTO annual exam     Subjective:     Patient ID: Wendy Hernandez is a 51 y o  female  HPI  here to discuss menses  Menarche at age 14  LMP 20  Next vaginal bleeding started 21  Bleeding is described as light every day and continues today  Has never had episodes of prolonged bleeding in the past   Denies alleviating or aggravating factors  Patient had a  left hip injury bleeding started coinciding with that injury  Patient denies hot flashes of note patient is on gabapentin daily  Denies other signs or symptoms of menopause  Denies new medications, vitamins or supplements  Denies headaches, visual changes and breast discharge  Is sexually active not using any form of contraception  Medical history significant for neuropathy, reflux, hypothyroid, B12 deficiency and obesity  Patient is a every day smoker    Last Pap smear 19 NILM/ HR HPV(-)  Last mammogram 21 BI-RADS 2  CRC screening 19 colonoscopy    Review of Systems   Constitutional: Negative for chills, fatigue and fever  Respiratory: Negative  Cardiovascular: Negative  Genitourinary: Positive for menstrual problem           Objective:  /68   Ht 5' 10" (1 778 m)   Wt 102 kg (225 lb)   LMP 12/09/2021   BMI 32 28 kg/m²      Physical Exam  Vitals reviewed  Constitutional:       Appearance: Normal appearance  She is obese  Cardiovascular:      Rate and Rhythm: Normal rate and regular rhythm  Heart sounds: Normal heart sounds  Pulmonary:      Effort: Pulmonary effort is normal       Breath sounds: Normal breath sounds  Neurological:      Mental Status: She is alert and oriented to person, place, and time     Psychiatric:         Mood and Affect: Mood normal          Behavior: Behavior normal

## 2022-02-05 DIAGNOSIS — R20.2 PARESTHESIA: ICD-10-CM

## 2022-02-07 ENCOUNTER — CLINICAL SUPPORT (OUTPATIENT)
Dept: FAMILY MEDICINE CLINIC | Facility: CLINIC | Age: 47
End: 2022-02-07
Payer: COMMERCIAL

## 2022-02-07 DIAGNOSIS — E53.8 B12 DEFICIENCY: ICD-10-CM

## 2022-02-07 PROCEDURE — 96372 THER/PROPH/DIAG INJ SC/IM: CPT

## 2022-02-07 RX ORDER — GABAPENTIN 100 MG/1
CAPSULE ORAL
Qty: 90 CAPSULE | Refills: 0 | Status: SHIPPED | OUTPATIENT
Start: 2022-02-07 | End: 2022-03-07 | Stop reason: SDUPTHER

## 2022-02-07 RX ADMIN — CYANOCOBALAMIN 1000 MCG: 1000 INJECTION INTRAMUSCULAR; SUBCUTANEOUS at 16:27

## 2022-02-10 NOTE — PATIENT INSTRUCTIONS
Thank you for visiting [facility]  You may be invited to complete a survey about you visit  Your responses will help us improve care we provide  A 10 means the care you received at your visit met your expectations  If you are unable to give a 10 please list reasons so we can work on improving your patient experience  Cigarette Smoking and Your Health   WHAT YOU NEED TO KNOW:   What are the risks to my health if I smoke tobacco?  Nicotine and other chemicals found in tobacco and e-cigarettes can damage every cell in your body  Even if you are a light smoker, you have an increased risk for cancer, heart disease, and lung disease  If you are pregnant or have diabetes, smoking increases your risk for complications  Nicotine can affect an adolescent's developing brain  This can lead to trouble thinking, learning, or paying attention  What are the benefits to my health if I stop smoking? · You decrease respiratory symptoms such as coughing, wheezing, and shortness of breath  · You reduce your risk for cancers of the lung, mouth, throat, kidney, bladder, pancreas, stomach, and cervix  If you already have cancer, you increase the benefits of chemotherapy  You also reduce your risk for cancer returning or a second cancer from developing  · You reduce your risk for heart disease, blood clots, heart attack, and stroke  · You reduce your risk for lung infections, and diseases such as pneumonia, asthma, chronic bronchitis, and emphysema  · Your circulation improves  More oxygen can be delivered to your body  If you have diabetes, you lower your risk for complications, such as kidney, artery, and eye diseases  You also lower your risk for nerve damage  Nerve damage can lead to amputations, poor vision, and blindness  · You improve your body's ability to heal and to fight infections  · An adolescent can help his or her brain and body develop in a healthy way   Talk to your adolescent about all the health risks of nicotine  If you can, start talking about nicotine when your child is younger than 12 years  This may make it easier for him or her not to start using nicotine as a teenager or adult  Explain to him or her that it is best never to start  It can be hard to try to quit later  What are the health benefits to others if I stop smoking? Tobacco is harmful to nonsmokers who breathe in your secondhand smoke  The following are ways the health of others around you may improve when you stop smoking:  · You lower the risks for lung cancer and heart disease in nonsmoking adults  · If you are pregnant, you lower the risk for miscarriage, early delivery, low birth weight, and stillbirth  You also lower your baby's risk for SIDS, obesity, developmental delay, and neurobehavioral problems, such as ADHD  · If you have children, you lower their risk for ear infections, colds, pneumonia, bronchitis, and asthma  Where can I find support and more information? · American Lung Association  [address] [facility]  [address]  Phone: [facility]  Phone: 1- 862 - [phone]  Web Address: [employer]    · Smokefree  gov  Phone: 6- 548 - 954-5338  Web Address: www smokefree  gov    CARE AGREEMENT:   You have the right to help plan your care  Learn about your health condition and how it may be treated  Discuss treatment options with your healthcare providers to decide what care you want to receive  You always have the right to refuse treatment  The above information is an  only  It is not intended as medical advice for individual conditions or treatments  Talk to your doctor, nurse or pharmacist before following any medical regimen to see if it is safe and effective for you  © Copyright [employer] 2021 Information is for End User's use only and may not be sold, redistributed or otherwise used for commercial purposes   All illustrations and images included in [employer] are the copyrighted property of [employer]  or [employer] INFORMATION:    What is a mammogram?  A mammogram is an x-ray of your breasts to screen for breast cancer  Who should have a mammogram?  You should have a mammogram if you felt a lump or noticed other changes during a breast self-exam  Talk to your caregiver about when you should start having mammograms  How do I get ready for a mammogram?   · Do not use deodorant, powder, lotion, or perfume  These products may cause particles to appear on your mammogram      · Wear a 2-piece outfit  · If you are breastfeeding, express as much milk as possible before the mammogram     · Bring a list of the dates and places of your past mammograms and other breast tests or treatments  · If your breasts are tender before your monthly period, do not have a mammogram during this time  Schedule your mammogram to be done 1 week after your period ends  How is a mammogram done? A top view and a side view x-ray are usually done for each breast  Tell caregivers if you have breast implants or breast problems before you have your mammogram  You may need extra x-rays of each breast   · You will be given a hospital gown  Take off your clothes from the waist up  Wear the hospital gown so that it opens in the front  · You will sit or stand next to a small x-ray machine  The caregiver will help you place one of your breasts on the x-ray plate  Your arm and breast will be moved until your breast is in the correct position  · Your breast will be gently pressed between 2 plastic plates for a few seconds while the x-ray is taken  This may be uncomfortable  · You will be asked to hold your breath while the x-ray is taken  Another x-ray will be taken of the same breast after the position of the x-ray machine has been changed  · Your other breast will be x-rayed the same way    What happens after my mammogram?  Your breasts may feel tender for a short while after the mammogram  You may resume your regular activities  Ask your caregiver when you should receive the results of your mammogram   What are the risks of a mammogram?  You will be exposed to a small amount of radiation  Some breast cancers may not show up on mammograms  When should I contact my caregiver? Contact your caregiver if:  · You cannot make your appointment on time  · You do not receive your results when expected  · You have questions or concerns about the mammogram   CARE AGREEMENT:   You have the right to help plan your care  Learn about your health condition and how it may be treated  Discuss treatment options with your caregivers to decide what care you want to receive  You always have the right to refuse treatment  The above information is an  only  It is not intended as medical advice for individual conditions or treatments  Talk to your doctor, nurse or pharmacist before following any medical regimen to see if it is safe and effective for you  © 2014 [address] is for End User's use only and may not be sold, redistributed or otherwise used for commercial purposes  All illustrations and images included in CareNotes® are the copyrighted property of [employer]  or [employer]  Breast Self Exam for Women   WHAT YOU NEED TO KNOW:   What is a breast self-exam (BSE)? A BSE is a way to check your breasts for lumps and other changes  Regular BSEs can help you know how your breasts normally look and feel  Most breast lumps or changes are not cancer, but you should always have them checked by a healthcare provider  Your healthcare provider can also watch you do a BSE and can tell you if you are doing your BSE correctly  Why should I do a BSE? Breast cancer is the most common type of cancer in women  Even if you have mammograms, you may still want to do a BSE regularly   If you know how your breasts normally feel and look, it may help you know when to contact your healthcare provider  Mammograms can miss some cancers  You may find a lump during a BSE that did not show up on a mammogram   When should I do a BSE? If you have periods, you may want to do your BSE 1 week after your period ends  This is the time when your breasts may be the least swollen, lumpy, or tender  You can do regular BSEs even if you are breastfeeding or have breast implants  How should I do a BSE? · Look at your breasts in a mirror  Look at the size and shape of each breast and nipple  Check for swelling, lumps, dimpling, scaly skin, or other skin changes  Look for nipple changes, such as a nipple that is painful or beginning to pull inward  Gently squeeze both nipples and check to see if fluid (that is not breast milk) comes out of them  If you find any of these or other breast changes, contact your healthcare provider  Check your breasts while you sit or  the following 3 positions:    ? Hang your arms down at your sides  ? Raise your hands and join them behind your head  ? Put firm pressure with your hands on your hips  Bend slightly forward while you look at your breasts in the mirror  · Lie down and feel your breasts  When you lie down, your breast tissue spreads out evenly over your chest  This makes it easier for you to feel for lumps and anything that may not be normal for your breasts  Do a BSE on one breast at a time  ? Place a small pillow or towel under your left shoulder  Put your left arm behind your head  ? Use the 3 middle fingers of your right hand  Use your fingertip pads, on the top of your fingers  Your fingertip pad is the most sensitive part of your finger  ? Use small circles to feel your breast tissue  Use your fingertip pads to make dime-sized, overlapping circles on your breast and armpits  Use light, medium, and firm pressure  First, press lightly   Second, press with medium pressure to feel a little deeper into the breast  Last, use firm pressure to feel deep within your breast     ? Examine your entire breast area  Examine the breast area from above the breast to below the breast where you feel only ribs  Make small circles with your fingertips, starting in the middle of your armpit  Make circles going up and down the breast area  Continue toward your breast and all the way across it  Examine the area from your armpit all the way over to the middle of your chest (breastbone)  Stop at the middle of your chest     ? Move the pillow or towel to your right shoulder, and put your right arm behind your head  Use the 3 fingertip pads of your left hand, and repeat the above steps to do a BSE on your right breast     What else can I do to check for breast problems or cancer? Talk to your healthcare provider about mammograms  A mammogram is an x-ray of your breasts to screen for breast cancer or other problems  Your provider can tell you the benefits and risks of mammograms  The first mammogram is usually at age 39 or 48  Your provider may recommend you start at 36 or younger if your risk for breast cancer is high  Mammograms usually continue every 1 to 2 years until age 76  When should I call my doctor? · You find any lumps or changes in your breasts  · You have breast pain or fluid coming from your nipples  · You have questions or concerns or concerns about your condition or care  CARE AGREEMENT:   You have the right to help plan your care  Learn about your health condition and how it may be treated  Discuss treatment options with your healthcare providers to decide what care you want to receive  You always have the right to refuse treatment  The above information is an  only  It is not intended as medical advice for individual conditions or treatments   Talk to your doctor, nurse or pharmacist before following any medical regimen to see if it is safe and effective for you   © Copyright [employer] 2021 Information is for End User's use only and may not be sold, redistributed or otherwise used for commercial purposes   All illustrations and images included in CareNotes® are the copyrighted property of A D A M , Inc  or Luli Faustin

## 2022-02-10 NOTE — PROGRESS NOTES
Subjective      Beryl Linda is a 51 y o  female who presents for annual well woman exam   Last Pap smear 19 NILM/ HR HPV(-)  Last mammogram 21 Sergo Emmanuel  Has mammogram scheduled today  Colonoscopy 19  Recently seen with AUB found to have abnormal TFT  Has not heard from PCP regarding changes to levothyroxine  Plans to call tomorrow  Continues to have irregular vaginal bleeding  No intermenstrual bleeding, spotting, or discharge  Current contraception: abstinence  History of abnormal Pap smear: yes - many years ago  Family history of uterine or ovarian cancer: no  Regular self breast exam: yes  History of abnormal mammogram: no  Family history of breast cancer: yes - M  Aunt  History of abnormal lipids: no        Menstrual History:  OB History        1    Para   1    Term   1            AB        Living   1       SAB        IAB        Ectopic        Multiple        Live Births   1                Menarche age: 14  Patient's last menstrual period was 2022  Period Cycle (Days):  (monthly)  Period Pattern: (!) Irregular (AUB -)    The following portions of the patient's history were reviewed and updated as appropriate: allergies, current medications, past family history, past medical history, past social history, past surgical history and problem list     Review of Systems  Pertinent items are noted in HPI        Objective      /64   Ht 5' 10" (1 778 m)   Wt 102 kg (224 lb)   LMP 2022   BMI 32 14 kg/m²     General:   alert and oriented, in no acute distress, alert, moderately obese, appears stated age and cooperative   Heart: regular rate and rhythm, S1, S2 normal, no murmur, click, rub or gallop   Lungs: clear to auscultation bilaterally   Abdomen: soft, non-tender, without masses or organomegaly and nondistended   Vulva: normal, Bartholin's, Urethra, Knierim's normal, female escutcheon   Vagina: normal mucosa, normal discharge, no palpable nodules   Cervix: no cervical motion tenderness and no lesions   Uterus: normal size, non-tender, normal shape and consistency   Adnexa: normal adnexa and no mass, fullness, tenderness   Breast: breasts appear normal, no suspicious masses, no skin or nipple changes or axillary nodes  Assessment      @well woman@   Plan      All questions answered  Await pap smear results  Breast self exam technique reviewed and patient encouraged to perform self-exam monthly  Diagnosis explained in detail, including differential   Dietary diary  Discussed healthy lifestyle modifications  Educational material distributed  Follow up in 1 Year for annual exam   Follow up as needed  Mammogram   Pelvic ultrasound  Thin prep Pap smear  Breast awareness reviewed   Reviewed with patient still having abnormal uterine bleeding after euthyroid would recommend pelvic ultrasound and possibly endometrial biopsy  Patient verbalizes understanding patient encouraged to call PCP regarding abnormal thyroid labs  Encouraged healthy diet, exercise and lifestyle  Encouraged follow-up with PCP as needed  Encouraged seasonal influenza vaccination  Gardasil vaccine series reviewed  Written information provided  Encouraged social distancing, good hand hygiene, avoidance of crowds and masking  Written information provided about COVID-19    Will call with results  VBI-    BMI Counseling: Body mass index is 32 14 kg/m²  The BMI is above normal  Nutrition recommendations include reducing portion sizes, decreasing overall calorie intake and 3-5 servings of fruits/vegetables daily  Exercise recommendations include exercising 3-5 times per week, joining a gym and strength training exercises  Tobacco Cessation Counseling: Tobacco cessation counseling was not provided  The patient is sincerely urged to quit consumption of tobacco  She is not ready to quit tobacco  The numerous health risks of tobacco consumption were discussed  Aware of options

## 2022-02-14 ENCOUNTER — ANNUAL EXAM (OUTPATIENT)
Dept: OBGYN CLINIC | Facility: MEDICAL CENTER | Age: 47
End: 2022-02-14
Payer: COMMERCIAL

## 2022-02-14 ENCOUNTER — HOSPITAL ENCOUNTER (OUTPATIENT)
Dept: MAMMOGRAPHY | Facility: MEDICAL CENTER | Age: 47
Discharge: HOME | End: 2022-02-14
Payer: COMMERCIAL

## 2022-02-14 VITALS
HEIGHT: 70 IN | SYSTOLIC BLOOD PRESSURE: 116 MMHG | BODY MASS INDEX: 32.07 KG/M2 | DIASTOLIC BLOOD PRESSURE: 64 MMHG | WEIGHT: 224 LBS

## 2022-02-14 VITALS — BODY MASS INDEX: 32.19 KG/M2 | WEIGHT: 224.87 LBS | HEIGHT: 70 IN

## 2022-02-14 DIAGNOSIS — Z12.31 BREAST CANCER SCREENING BY MAMMOGRAM: ICD-10-CM

## 2022-02-14 DIAGNOSIS — Z01.419 WELL WOMAN EXAM WITH ROUTINE GYNECOLOGICAL EXAM: Primary | ICD-10-CM

## 2022-02-14 DIAGNOSIS — N93.9 ABNORMAL UTERINE BLEEDING (AUB): ICD-10-CM

## 2022-02-14 PROCEDURE — G0145 SCR C/V CYTO,THINLAYER,RESCR: HCPCS | Performed by: NURSE PRACTITIONER

## 2022-02-14 PROCEDURE — 77067 SCR MAMMO BI INCL CAD: CPT

## 2022-02-14 PROCEDURE — G0476 HPV COMBO ASSAY CA SCREEN: HCPCS | Performed by: NURSE PRACTITIONER

## 2022-02-14 PROCEDURE — 77063 BREAST TOMOSYNTHESIS BI: CPT

## 2022-02-14 PROCEDURE — 3008F BODY MASS INDEX DOCD: CPT | Performed by: NURSE PRACTITIONER

## 2022-02-14 PROCEDURE — S0612 ANNUAL GYNECOLOGICAL EXAMINA: HCPCS | Performed by: NURSE PRACTITIONER

## 2022-02-17 LAB
HPV HR 12 DNA CVX QL NAA+PROBE: NEGATIVE
HPV16 DNA CVX QL NAA+PROBE: NEGATIVE
HPV18 DNA CVX QL NAA+PROBE: NEGATIVE

## 2022-02-21 LAB
LAB AP GYN PRIMARY INTERPRETATION: NORMAL
LAB AP LMP: NORMAL
Lab: NORMAL

## 2022-02-22 ENCOUNTER — HOSPITAL ENCOUNTER (OUTPATIENT)
Dept: MAMMOGRAPHY | Facility: MEDICAL CENTER | Age: 47
Discharge: HOME | End: 2022-02-22

## 2022-02-22 VITALS — BODY MASS INDEX: 32.07 KG/M2 | WEIGHT: 224 LBS | HEIGHT: 70 IN

## 2022-02-22 DIAGNOSIS — Z12.39 SCREENING BREAST EXAMINATION: ICD-10-CM

## 2022-02-24 ENCOUNTER — TELEPHONE (OUTPATIENT)
Dept: FAMILY MEDICINE CLINIC | Facility: CLINIC | Age: 47
End: 2022-02-24

## 2022-02-24 NOTE — TELEPHONE ENCOUNTER
PT woke up this morning with left side throat pain going into her left ear and into her neck  She said there are white spots in her throat  She cannot do a virtual or in office  Wondering if she can have an antibiotic? Please advise  [facility] [address]  This all started on Tuesday

## 2022-02-25 ENCOUNTER — HOSPITAL ENCOUNTER (OUTPATIENT)
Dept: ULTRASOUND IMAGING | Facility: HOSPITAL | Age: 47
Discharge: HOME | End: 2022-02-25
Payer: COMMERCIAL

## 2022-02-25 DIAGNOSIS — N93.9 ABNORMAL UTERINE BLEEDING (AUB): ICD-10-CM

## 2022-02-25 PROCEDURE — 76856 US EXAM PELVIC COMPLETE: CPT

## 2022-02-25 PROCEDURE — 76830 TRANSVAGINAL US NON-OB: CPT

## 2022-02-27 DIAGNOSIS — E89.0 POSTOPERATIVE HYPOTHYROIDISM: ICD-10-CM

## 2022-02-28 RX ORDER — LEVOTHYROXINE SODIUM 0.2 MG/1
TABLET ORAL
Qty: 90 TABLET | Refills: 0 | Status: SHIPPED | OUTPATIENT
Start: 2022-02-28 | End: 2022-06-02

## 2022-02-28 RX ORDER — LEVOTHYROXINE SODIUM 0.05 MG/1
TABLET ORAL
Qty: 90 TABLET | Refills: 0 | Status: SHIPPED | OUTPATIENT
Start: 2022-02-28 | End: 2022-06-02

## 2022-03-01 ENCOUNTER — TELEPHONE (OUTPATIENT)
Dept: OBGYN CLINIC | Facility: MEDICAL CENTER | Age: 47
End: 2022-03-01

## 2022-03-01 PROBLEM — N83.201 RIGHT OVARIAN CYST: Status: ACTIVE | Noted: 2022-03-01

## 2022-03-07 ENCOUNTER — TELEMEDICINE (OUTPATIENT)
Dept: FAMILY MEDICINE CLINIC | Facility: CLINIC | Age: 47
End: 2022-03-07
Payer: COMMERCIAL

## 2022-03-07 ENCOUNTER — CLINICAL SUPPORT (OUTPATIENT)
Dept: FAMILY MEDICINE CLINIC | Facility: CLINIC | Age: 47
End: 2022-03-07
Payer: COMMERCIAL

## 2022-03-07 DIAGNOSIS — N93.9 ABNORMAL UTERINE BLEEDING (AUB): Primary | ICD-10-CM

## 2022-03-07 DIAGNOSIS — E53.8 B12 DEFICIENCY: ICD-10-CM

## 2022-03-07 DIAGNOSIS — R20.2 PARESTHESIA: ICD-10-CM

## 2022-03-07 PROCEDURE — 96372 THER/PROPH/DIAG INJ SC/IM: CPT

## 2022-03-07 PROCEDURE — 99213 OFFICE O/P EST LOW 20 MIN: CPT | Performed by: NURSE PRACTITIONER

## 2022-03-07 PROCEDURE — 4004F PT TOBACCO SCREEN RCVD TLK: CPT | Performed by: NURSE PRACTITIONER

## 2022-03-07 RX ORDER — GABAPENTIN 100 MG/1
CAPSULE ORAL
Qty: 90 CAPSULE | Refills: 0 | Status: SHIPPED | OUTPATIENT
Start: 2022-03-07 | End: 2022-04-04 | Stop reason: SDUPTHER

## 2022-03-07 RX ADMIN — CYANOCOBALAMIN 1000 MCG: 1000 INJECTION INTRAMUSCULAR; SUBCUTANEOUS at 16:17

## 2022-03-07 NOTE — PROGRESS NOTES
Virtual Regular Visit    Verification of patient location:    Patient is located in the following state in which I hold an active license [place]      Assessment/Plan:    Problem List Items Addressed This Visit        Genitourinary    Abnormal uterine bleeding (AUB) - Primary      Continue with plan with EMB with GYN  Discussed potential causes of thickened endometrium and why biopsy is important  Discussed it was fine to continue with use of pads  Monitor symptoms  Call for any worsening bleeding/ symptoms/ questions  All questions answered  She will continue to f/u with GYN  Reason for visit is   Chief Complaint   Patient presents with    Virtual Regular Visit        Encounter provider Nola Henderson    Provider located at [address] [place] [address] [address] [place] [id number] [phone]      Recent Visits  No visits were found meeting these conditions  Showing recent visits within past 7 days and meeting all other requirements  Today's Visits  Date Type Provider Dept   03/07/22 Telemedicine Kelly Jimenes, [facility]   Showing today's visits and meeting all other requirements  Future Appointments  No visits were found meeting these conditions  Showing future appointments within next 150 days and meeting all other requirements       The patient was identified by name and date of birth  Dianna Pierre was informed that this is a telemedicine visit and that the visit is being conducted through [facility] and patient was informed that this is a secure, HIPAA-compliant platform  She agrees to proceed     My office door was closed  No one else was in the room  She acknowledged consent and understanding of privacy and security of the video platform  The patient has agreed to participate and understands they can discontinue the visit at any time  Patient is aware this is a billable service  Subjective  Taylor Lacey is a 51 y o  female   Virtual to discuss pelvic US and recommended biopsy  Had [address]25 of pelvis that showed thickened endometrium  Has biopsy scheduled 3/16/22  US in 2019 showed normal endometrium  Hasn't gone through complete menopause  Following with GYN  Switched from using tampon to pad and pad isn't saturated like tampon was  Past Medical History:   Diagnosis Date    Abnormal Pap smear of cervix     with ASUS favoring benign, last assessed 1/9/14    Tinnitus     hypothyroid       Past Surgical History:   Procedure Laterality Date    BREAST BIOPSY Left 2011    benign    TONSILLECTOMY AND ADENOIDECTOMY      TOTAL THYROIDECTOMY N/A 06/21/2014    Dr Unique Thompson BIOPSY LEFT COMPLETE Left        Current Outpatient Medications   Medication Sig Dispense Refill    Cyanocobalamin (B-12) 1000 MCG/ML KIT Inject as directed every 30 (thirty) days      FOLIC ACID PO Take by mouth daily        gabapentin (NEURONTIN) 100 mg capsule TAKE 3 CAPSULES BY MOUTH AT BEDTIME 90 capsule 0    levothyroxine 200 mcg tablet TAKE 1 TABLET BY MOUTH EVERY DAY 90 tablet 0    levothyroxine 50 mcg tablet TAKE 1 TABLET BY MOUTH EVERY DAY 90 tablet 0     Current Facility-Administered Medications   Medication Dose Route Frequency Provider Last Rate Last Admin    cyanocobalamin injection 1,000 mcg  1,000 mcg Intramuscular Q30 Days Griselda Barba, CRNP   1,000 mcg at 02/07/22 1627    cyanocobalamin injection 1,000 mcg  1,000 mcg Intramuscular X17 Days Pernell Montalvo DO   2,630 mcg at 01/04/22 1617        Allergies   Allergen Reactions    Lamisil [Terbinafine]     Prednisone      Rapid heart beat and palpitations        Review of Systems   Constitutional: Negative for chills and fever  Eyes: Negative for discharge  Respiratory: Negative for shortness of breath  Cardiovascular: Negative for chest pain     Gastrointestinal: Negative for constipation and diarrhea  Genitourinary: Positive for vaginal bleeding  Negative for difficulty urinating  Musculoskeletal: Negative for joint swelling  Skin: Negative for rash  Neurological: Negative for headaches  Hematological: Negative for adenopathy  Psychiatric/Behavioral: The patient is not nervous/anxious  Video Exam    There were no vitals filed for this visit  Physical Exam  Constitutional:       General: She is not in acute distress  Appearance: Normal appearance  She is not ill-appearing, toxic-appearing or diaphoretic  HENT:      Head: Normocephalic and atraumatic  Nose: Nose normal    Pulmonary:      Effort: Pulmonary effort is normal  No respiratory distress  Skin:     Coloration: Skin is not pale  Neurological:      Mental Status: She is alert and oriented to person, place, and time  Psychiatric:         Mood and Affect: Mood normal           I spent 15 minutes with patient today in which greater than 50% of the time was spent in counseling/coordination of care regarding vaginal bleeding    VIRTUAL VISIT [address] verbally agrees to participate in [employer]  Pt is aware that [employer] could be limited without vital signs or the ability to perform a full hands-on physical Natalia Mau understands she or the provider may request at any time to terminate the video visit and request the patient to seek care or treatment in person

## 2022-03-16 ENCOUNTER — PROCEDURE VISIT (OUTPATIENT)
Dept: OBGYN CLINIC | Facility: MEDICAL CENTER | Age: 47
End: 2022-03-16
Payer: COMMERCIAL

## 2022-03-16 VITALS
BODY MASS INDEX: 31.92 KG/M2 | HEIGHT: 70 IN | WEIGHT: 223 LBS | SYSTOLIC BLOOD PRESSURE: 122 MMHG | DIASTOLIC BLOOD PRESSURE: 75 MMHG

## 2022-03-16 DIAGNOSIS — N93.9 ABNORMAL UTERINE BLEEDING (AUB): Primary | ICD-10-CM

## 2022-03-16 LAB — SL AMB POCT URINE HCG: NORMAL

## 2022-03-16 PROCEDURE — 88305 TISSUE EXAM BY PATHOLOGIST: CPT | Performed by: PATHOLOGY

## 2022-03-16 PROCEDURE — 58100 BIOPSY OF UTERUS LINING: CPT | Performed by: NURSE PRACTITIONER

## 2022-03-16 PROCEDURE — 81025 URINE PREGNANCY TEST: CPT | Performed by: NURSE PRACTITIONER

## 2022-03-16 NOTE — PATIENT INSTRUCTIONS
Thank you for visiting [facility]  You may be invited to complete a survey about you visit  Your responses will help us improve care we provide  A 10 means the care you received at your visit met your expectations  If you are unable to give a 10 please list reasons so we can work on improving your patient experience  Dilation and Curettage   WHAT YOU NEED TO KNOW:   What do I need to know about dilation and curettage (D&C)? D&C is a procedure to remove tissue from the lining of your uterus  How do I prepare for a D&C? · Your healthcare provider will talk to you about how to prepare for your D&C  He or she may tell you not to eat or drink anything after midnight on the day of your procedure  He or she will tell you which medicines to take or not take on the day of your procedure  You may need blood tests before and after your D&C  You may need immune globulin medicine if your blood is Rh-negative, and you had a miscarriage  This medicine helps prevent problems with a future pregnancy  · You may need a vaginal swab test before your D&C to check for infection  Healthcare providers may place medicine in your cervix to help it dilate  This medicine may be inserted 1 to 2 days before your procedure or the day of your procedure  You may be given an antibiotic through your IV to help prevent a bacterial infection  What will happen during a D&C? · You may be given local or epidural anesthesia to numb your procedure area and decrease discomfort  With local or epidural anesthesia, you will be awake during your procedure  Medicine may be given in your IV to help you relax or make you drowsy  You may instead be given general anesthesia to keep you asleep during the procedure  You will lie on your back with your feet in stirrups  A tool called a speculum will be inserted into your vagina to keep it open  Your healthcare provider will check your cervix to see if it is dilated   If needed, he or she may use tools to dilate your cervix  · Your healthcare provider may use a tool or suction to remove tissue from your uterus  To use suction, your healthcare provider will insert a thin tube into your uterus  The tube is connected to a suction machine or a syringe  The tissue will be removed through the tube  Forceps may be needed to remove larger amounts of tissue  Tissue may be sent to a lab for tests  Medicines may be given to help your uterus tighten, and prevent heavy bleeding  What should I expect after a D&C? You will be taken to a recovery room so healthcare providers can watch for heavy bleeding or other problems  You may be able to go home a few hours after your D&C  Have someone drive you home  You may have cramps and spotting or light bleeding for a few days  If you had general anesthesia, have someone stay with you for 24 hours  This is to make sure you do not have a reaction to the anesthesia  It may take a few months for your monthly period to start or become regular  What are the risks of a D&C? · If you are awake during your D&C, you may have pain while the tissue is removed  Your uterus, cervix, intestines, or nearby tissue may be torn or damaged  You may have life-threatening blood loss, and need a blood transfusion or another surgery  You may need to have your uterus removed  You may get an infection in your uterus that could spread to your blood and become life-threatening  You may have an allergic reaction to the anesthesia or antibiotics  · After your D&C, you may have nausea, vomiting, dizziness, or a headache  Scar tissue may form in your uterus  You may need another D&C to remove more tissue from your uterus  If cancer caused your abnormal vaginal bleeding, lab tests may not find the cancer in your uterus  If your D&C was done to end a pregnancy, you have an increased risk for a future miscarriage   You are also at risk for  delivery during a future pregnancy  CARE AGREEMENT:   You have the right to help plan your care  Learn about your health condition and how it may be treated  Discuss treatment options with your healthcare providers to decide what care you want to receive  You always have the right to refuse treatment  The above information is an  only  It is not intended as medical advice for individual conditions or treatments  Talk to your doctor, nurse or pharmacist before following any medical regimen to see if it is safe and effective for you  © Copyright [employer] 2022 Information is for End User's use only and may not be sold, redistributed or otherwise used for commercial purposes  All illustrations and images included in CareNotes® are the copyrighted property of [employer] , [employer]  or [employer]:   What do I need to know about endometrial ablation? Endometrial ablation is a procedure to remove the endometrium (lining of your uterus)  You may need this procedure if you have heavy or abnormal vaginal bleeding  How do I prepare for the procedure? Your healthcare provider will talk to you about how to prepare for the procedure  You may be told not to eat or drink anything after midnight on the day of your procedure  You will also be told what medicines to take or not take on the day of your procedure  You may need someone to drive you home after the procedure and stay with you to make sure you are okay  What will happen during the procedure? You may be given local anesthesia to numb the area  You may instead be given general anesthesia to keep you asleep and free from pain during the procedure  Your healthcare provider will widen the opening of your cervix with medicine or medical tools  Ice, heated fluid, or electric energy may be used to remove the lining of your uterus  What should I expect after the procedure? You may feel some discomfort for a few days   This is normal and should stop soon  Contact your healthcare provider if any of the following becomes severe or continues:  · Cramps, similar to menstrual cramps, for 1 to 2 days    · Watery, bloody discharge for 2 to 3 days that may become light and last a few weeks    · Frequent urination for 24 hours    · Nausea    What are the risks of endometrial ablation? You may not be able to get pregnant after endometrial ablation  You may bleed more than expected or get an infection in your vagina, urinary tract, or uterus  Your cervix, uterus, or nearby organs may be burned or damaged  You may get a blood clot in your leg or arm  A blockage may form over months to years and cause blood to pool inside your uterus  This blockage may cause severe pain and you may need a hysterectomy  You may also need a hysterectomy if endometrial ablation does not work  CARE AGREEMENT:   You have the right to help plan your care  Learn about your health condition and how it may be treated  Discuss treatment options with your healthcare providers to decide what care you want to receive  You always have the right to refuse treatment  The above information is an  only  It is not intended as medical advice for individual conditions or treatments  Talk to your doctor, nurse or pharmacist before following any medical regimen to see if it is safe and effective for you  © Copyright [employer] 2022 Information is for End User's use only and may not be sold, redistributed or otherwise used for commercial purposes  All illustrations and images included in CareNotes® are the copyrighted property of [employer] M , Inc  or [facility]   Endometrial Biopsy   WHAT YOU NEED TO KNOW:   Endometrial biopsy is a procedure to remove a tissue sample from the lining of your uterus  This procedure is done through your vagina          WHILE YOU ARE HERE:   Before your procedure:   · Informed consent  is a legal document that explains the tests, treatments, or procedures that you may need  Informed consent means you understand what will be done and can make decisions about what you want  You give your permission when you sign the consent form  You can have someone sign this form for you if you are not able to sign it  You have the right to understand your medical care in words you know  Before you sign the consent form, understand the risks and benefits of what will be done  Make sure all your questions are answered  · NSAIDs  decrease swelling and pain  You may need to take an NSAID before your procedure  Follow your healthcare provider's instruction on when to take it  During your procedure: You will be awake during the procedure  An ultrasound or hysteroscope (tube with a light and a camera on the end) may be used  This helps your healthcare provider see inside your uterus to find the best spot to get the tissue sample  He or she will then insert a speculum into your vagina  This is the same tool used during a Pap smear  The speculum allows your healthcare provider to see inside your vagina to your cervix  He or she may need to numb your cervix  Your healthcare provider will insert a small tube into your vagina and cervix to remove a piece of tissue from the lining of your uterus  The tissue sample will be sent to a lab to be tested  After your procedure:  Do not get up until your healthcare provider says it is okay  When your healthcare provider sees that you are okay, you may be able to go home  You may have mild pain, cramping, or spotting for a few days after your procedure  RISKS:   You could get an infection after your procedure  Your uterus may be damaged  Damage can cause heavy bleeding and pain  You may need surgery to repair the damage  CARE AGREEMENT:   You have the right to help plan your care  Learn about your health condition and how it may be treated   Discuss treatment options with your healthcare providers to decide what care you want to receive  You always have the right to refuse treatment  © Copyright [employer] 2022 Information is for End User's use only and may not be sold, redistributed or otherwise used for commercial purposes  All illustrations and images included in CareNotes® are the copyrighted property of [employer] M [employer] Inc  or [employer]  The above information is an  only  It is not intended as medical advice for individual conditions or treatments  Talk to your doctor, nurse or pharmacist before following any medical regimen to see if it is safe and effective for you

## 2022-03-16 NOTE — PROGRESS NOTES
Endometrial biopsy    Date/Time: 3/16/2022 1:26 PM  Performed by: DEE Liu  Authorized by: DEE Liu   Universal Protocol:  Procedure performed by:  Consent: Verbal consent obtained  Written consent obtained  Risks and benefits: risks, benefits and alternatives were discussed  Consent given by: patient  Time out: Immediately prior to procedure a "time out" was called to verify the correct patient, procedure, equipment, support staff and site/side marked as required  Timeout called at: 3/16/2022 1:15 PM   Patient understanding: patient states understanding of the procedure being performed  Patient consent: the patient's understanding of the procedure matches consent given  Procedure consent: procedure consent matches procedure scheduled  Relevant documents: relevant documents present and verified  Test results: test results available and properly labeled  Site marked: the operative site was not marked  Radiology Images displayed and confirmed  If images not available, report reviewed: imaging studies available  Required items: required blood products, implants, devices, and special equipment available  Patient identity confirmed: verbally with patient      Indication:     Indications:  Other disorder of menstruation and other abnormal bleeding from female genital tract    Pre-procedure:     Premeds:  Ibuprofen  Procedure:     Procedure: endometrial biopsy with Pipelle      A bivalve speculum was placed in the vagina: yes      Cervix cleaned and prepped: yes      A paracervical block was performed: no      An intracervical block was performed: no      The cervix was dilated: no      Uterus sounded: yes      Uterus sound depth (cm):  9    Curettes used:  1    Specimen collected: specimen collected and sent to pathology      Patient tolerated procedure well with no complications: yes    Findings:     Uterus size:  9-10 weeks    Cervix: normal      Adnexa: normal    Comments:     Procedure comments:   here for endometrial biopsy for abnormal uterine bleeding  Risks, benefits and alternatives reviewed  Patient verbalizes understanding and desires testing today  Consent reviewed and signed  Patient declined questions  UPT negative in office today  Tolerated procedure well  Signs and symptoms to report reviewed    Specimen sent to pathology

## 2022-03-27 NOTE — PROGRESS NOTES
Assessment/Plan:      Diagnoses and all orders for this visit:    Abnormal uterine bleeding (AUB)  -     norethindrone (Orquidea) 0 35 MG tablet; Take 1 tablet (0 35 mg total) by mouth daily      - reviewed methods to control her AUB including LARCs  Patient is most interested in POP  - Rx norethindrone reviewed quick start  Patient will take pill same time every day  Common side effects reviewed  Written information provided  -reviewed with patient can stop after 3 months to see if abnormal bleeding returns  If patient feels more comfortable can continue with POP  Patient will call office with any abnormal bleeding  -encouraged to continue follow-up with PCP for chronic conditions  -signs and symptoms to report reviewed    RTO  2023 for annual exam sooner as needed    Subjective:     Patient ID: Beryl Linda is a 51 y o  female  HPI  here to follow-up for AUB  Was seen in office 22 with complaints of AUB  Pelvic US resulted with endometrial lining 20 mm, uterine fibroid and right ovarian cyst  labs resulted with no signs of anemia and hyperthyroid levothyroxine dose adjusted by PCP  EMB resulted with proliferative endometrium  No dysplasia or carcinoma  Continues to have daily bleeding light/ scant  Denies pain  Patient is not currently sexually active  Medical history is significant for hypothyroid, right ovarian cyst, reflux and tobacco use  Last pap smear 22 NILM/ HR HPV (-)  Last mammogram 21 birads 2  Last CRC screening 19     Review of Systems   Constitutional: Negative for chills, fatigue and fever  Respiratory: Negative  Cardiovascular: Negative  Objective:  /80   Ht 5' 10" (1 778 m)   Wt 101 kg (223 lb)   LMP 2022 (Approximate)   BMI 32 00 kg/m²      Physical Exam  Constitutional:       Appearance: Normal appearance  Cardiovascular:      Rate and Rhythm: Normal rate and regular rhythm  Heart sounds: Normal heart sounds  Pulmonary:      Breath sounds: Normal breath sounds  Neurological:      Mental Status: She is alert and oriented to person, place, and time     Psychiatric:         Mood and Affect: Mood normal          Behavior: Behavior normal

## 2022-03-27 NOTE — PATIENT INSTRUCTIONS
Thank you for visiting [facility]  You may be invited to complete a survey about you visit  Your responses will help us improve care we provide  A 10 means the care you received at your visit met your expectations  If you are unable to give a 10 please list reasons so we can work on improving your patient experience  Abnormal (Dysfunctional) Uterine Bleeding   WHAT YOU NEED TO KNOW:   What is abnormal uterine bleeding (AUB)? AUB is uterine bleeding that is not usual for you  It may also be called dysfunctional uterine bleeding  You may have bleeding from your uterus at times other than your normal monthly period  Your monthly periods may last longer or shorter, and bleeding may be heavier or lighter than usual  AUB can be acute (lasting a short time) or chronic (lasting longer than 6 months)  What causes AUB? The following can cause or increase your risk for AUB:  · Age 36 or older, or 15 or younger    · Too much or too little estrogen    · An ovary does not release an egg during ovulation    · A medical condition, such as polycystic ovary syndrome (PCOS), diabetes, or long-term liver or kidney disease    · Not giving birth    · A growth, such as a tumor or a polyp    · Trauma such as an injury to your uterus or cervix    · An infection such as a sexually transmitted infection    · An overactive or underactive thyroid or adrenal gland    · An eating disorder, such as bulimia or anorexia, or too much exercise    · Certain medications, or hormone replacement therapy    · A large weight gain or loss    What are the signs and symptoms of AUB?    · Bleeding or spotting between periods    · Bleeding that starts 12 months or longer after you have been through menopause    · The amount of bleeding during your period is heavier or lighter than usual    · The number of days that you bleed during your regular period is longer than usual, or more than 7 days    · The number of days that you bleed is shorter than usual, or less than 2 days    · The time between your monthly periods is shorter or longer than usual    How is AUB diagnosed? Your healthcare provider will ask about your monthly periods  Tell him or her about all changes to your periods  Include when the changes started  Your provider may ask the age you were when you got your first period  He or she may ask if you use tampons or sanitary pads  Tell him or her about any medical conditions you have and all medicines you currently use  You may need any of the following, depending on your age and medical history:  · Blood tests  may be done to find the cause of your AUB and problems caused by AUB, such as anemia  Your provider may recommend screening for an STI as part of the blood tests  · A pelvic exam  may be done to find the source of your bleeding  · A hysteroscopy  is a procedure to look at your endometrium  The endometrium is the lining inside of your uterus  Your healthcare provider will insert a small tube with a camera at the end into your uterus  · A biopsy  is a procedure to remove a small piece of tissue from the endometrium  The tissue is sent to a lab for tests  · An ultrasound  uses sound waves to show pictures of your uterus, ovaries, tubes, and vagina on a monitor  · A pap smear  may be needed  Your healthcare provider takes a sample of tissue from your cervix and sends it to a lab for tests  How is AUB treated? · Medicines  may be given to treat a condition causing AUB  An example is medicine to increase or decrease the amount of hormone your thyroid makes  Hormones may be given to help decrease bleeding by making your monthly periods more regular  Sometimes this medicine may be given as birth control pills  Iron supplements may be given if your blood iron level decreases because of heavy bleeding  · Procedures  such as endometrial ablation or dilation and curettage may be used to control your bleeding  · Surgery  may be needed if medicines do not work or cannot be used  You may need an abdominal or vaginal hysterectomy  A hysterectomy is surgery to remove your uterus  How do I care for myself at home? · Include foods high in iron if needed  Examples of foods high in iron are leafy green vegetables, beef, pork, liver, eggs, and whole-grain breads and cereals  · Keep a diary of your menstrual cycles  Keep track of the number of tampons or pads you use each day  · Talk to your healthcare provider before you start a weight loss program   You may need to wait until the abnormal bleeding has stopped before you try to lose weight  The amount of iron in your blood should be normal before you lose weight  Ask your provider if weight loss will help your AUB  He or she can tell you what weight is healthy for you  He or she can help you create a safe weight loss plan, if needed  When should I seek immediate care? · You continue to bleed heavily, or you feel faint  When should I call my doctor or gynecologist?   · You need to change your sanitary pad or tampon more than 1 time each hour  · Your medicine causes nausea, vomiting, or diarrhea  · You have questions or concerns about your condition or care  CARE AGREEMENT:   You have the right to help plan your care  Learn about your health condition and how it may be treated  Discuss treatment options with your healthcare providers to decide what care you want to receive  You always have the right to refuse treatment  The above information is an  only  It is not intended as medical advice for individual conditions or treatments  Talk to your doctor, nurse or pharmacist before following any medical regimen to see if it is safe and effective for you  © Copyright [employer] 2022 Information is for End User's use only and may not be sold, redistributed or otherwise used for commercial purposes   All illustrations and images included in [employer] are the copyrighted property of A D A M , Inc  or [facility] Carlos Epps       Norethindrone (By mouth)   Norethindrone (nor-ETH-in-drone)  Prevents pregnancy  Also treats menstrual problems and endometriosis  This medicine is commonly called a birth control pill  Brand Name(s): Aygestin, Orquidea, Deblitane, Mela, [place], [place], [place], [place], [place], Tracee-BE, [place], Norlyroc, Ortho Micronor, [place], [place]   There may be other brand names for this medicine  When This Medicine Should Not Be Used: This medicine is not right for everyone  Do not use it if you had an allergic reaction to norethindrone or similar medicines, or if you are pregnant  Do not use it if you have liver disease, liver tumors, unusual vaginal bleeding, or a history of blood clots, stroke, heart attack, or breast cancer  How to Use This Medicine:   Tablet  · Your doctor will tell you how much medicine to use  Do not use more than directed  Different brands of birth control pills have different instructions for when to start  Ask your doctor or pharmacist if you do not understand what day to start taking your brand  · You may take this medicine with food or milk if it upsets your stomach  · Take your pill at the same time every day  Birth control pills work best when there is no more than 24 hours between doses  · Read and follow the patient instructions that come with this medicine  Talk to your doctor or pharmacist if you have any questions  · Missed dose:   ? This medicine has specific patient instructions on what to do if you miss a dose  Read and follow these instructions carefully, and call your doctor if you have any questions  ? If you miss a dose, and it is more than 3 hours from your scheduled dose, take the pill as soon as you can, then take your next pill at the next regular time  Use a second form of birth control (including condom, spermicide) for the next 48 hours    ? If you vomit after taking the pills, use another form of birth control for the next 48 hours  ? Do not use extra medicine to make up for a missed dose  · Store the medicine in a closed container at room temperature, away from heat, moisture, and direct light  Drugs and Foods to Avoid:   Ask your doctor or pharmacist before using any other medicine, including over-the-counter medicines, vitamins, and herbal products  · Some foods and medicines can affect how norethindrone birth control pills work  Tell your doctor if you are also taking any of the following:  ? Aprepitant, bosentan, carbamazepine, cyclosporine, felbamate, fluconazole, griseofulvin, itraconazole, ketoconazole, oxcarbazepine, phenytoin, rifabutin, rifampicin, rifampin, rufinamide, Cassidy's wort, voriconazole  ? Barbiturates  ? Medicine to treat HIV or AIDS (including atazanavir/ritonavir, boceprevir, darunavir/ritonavir, efavirenz, etravirine, fosamprenavir/ritonavir, indinavir, lopinavir/ritonavir, nelfinavir, nevirapine, ritonavir, telaprevir, tipranavir/ritonavir)  · Do not eat grapefruit or drink grapefruit juice while you are using this medicine  · Take this medicine at least 5 days after using ulipristal acetate  Use another form of birth control (including condom, spermicide) until your next menstrual period  Warnings While Using This Medicine:   · Tell your doctor right away if you think you have become pregnant  If you miss two periods in a row, call your doctor for a pregnancy test before you take any more pills  · Tell your doctor if you are breastfeeding or if you have kidney disease, lupus, high blood pressure, high cholesterol, seizures, asthma, migraine headaches, diabetes, or a history of depression  Tell your doctor if you smoke  · This medicine may cause the following problems:  ? Increased risk of ectopic pregnancy (pregnancy outside the uterus)  ? Cysts in the ovaries  ? Irregular menstrual bleeding  ? Increased risk of breast cancer  ?  Liver problems, including liver cancer or tumor  ? Increased risk of blood clots, heart attack, or stroke  · This medicine will not protect you from HIV/AIDS or other sexually transmitted diseases  · Use a second form of birth control during the first 3 weeks to make sure you are protected from pregnancy  · Tell any doctor or dentist who treats you that you are using this medicine  This medicine may affect certain medical test results  · Your doctor will check your progress and the effects of this medicine at regular visits  Keep all appointments  · Keep all medicine out of the reach of children  Never share your medicine with anyone  Possible Side Effects While Using This Medicine:   Call your doctor right away if you notice any of these side effects:  · Allergic reaction: Itching or hives, swelling in your face or hands, swelling or tingling in your mouth or throat, chest tightness, trouble breathing  · Breast tenderness or swelling  · Chest pain, trouble breathing, coughing up blood  · Dark urine or pale stools, loss of appetite, nausea, vomiting, stomach pain, yellow skin or eyes  · Irregular, late, or missed menstrual period  · Numbness or weakness on one side of your body, sudden or severe headache, problems with vision, speech, or walking  · Severe headache, sensitivity to light or sound  · Trouble seeing, double vision, or other eye problems  · Unusual vaginal bleeding, spotting, discharge, or itching  If you notice these less serious side effects, talk with your doctor:   · Headache, dizziness  · Vaginal spotting or light bleeding, itching, discharge  If you notice other side effects that you think are caused by this medicine, tell your doctor  Call your doctor for medical advice about side effects  You may report side effects to FDA at 0-502-IYN-4374    © Copyright [facility] 2022 Information is for End User's use only and may not be sold, redistributed or otherwise used for commercial purposes    The above information is an  only  It is not intended as medical advice for individual conditions or treatments  Talk to your doctor, nurse or pharmacist before following any medical regimen to see if it is safe and effective for you

## 2022-03-28 ENCOUNTER — OFFICE VISIT (OUTPATIENT)
Dept: OBGYN CLINIC | Facility: MEDICAL CENTER | Age: 47
End: 2022-03-28
Payer: COMMERCIAL

## 2022-03-28 VITALS
SYSTOLIC BLOOD PRESSURE: 122 MMHG | HEIGHT: 70 IN | DIASTOLIC BLOOD PRESSURE: 80 MMHG | BODY MASS INDEX: 31.92 KG/M2 | WEIGHT: 223 LBS

## 2022-03-28 DIAGNOSIS — N93.9 ABNORMAL UTERINE BLEEDING (AUB): Primary | ICD-10-CM

## 2022-03-28 PROCEDURE — 99213 OFFICE O/P EST LOW 20 MIN: CPT | Performed by: NURSE PRACTITIONER

## 2022-03-28 RX ORDER — ACETAMINOPHEN AND CODEINE PHOSPHATE 120; 12 MG/5ML; MG/5ML
1 SOLUTION ORAL DAILY
Qty: 90 TABLET | Refills: 3 | Status: SHIPPED | OUTPATIENT
Start: 2022-03-28 | End: 2022-05-25

## 2022-04-04 ENCOUNTER — TELEPHONE (OUTPATIENT)
Dept: OBGYN CLINIC | Facility: MEDICAL CENTER | Age: 47
End: 2022-04-04

## 2022-04-04 DIAGNOSIS — R20.2 PARESTHESIA: ICD-10-CM

## 2022-04-04 NOTE — TELEPHONE ENCOUNTER
Telephone call to patient  Patient identity confirmed by full name and date of birth  Patient was complaining of nausea and headaches that have resolved since stopping norethindrone  Patient is an every day smoker  Reviewed with patient would avoid all estrogen products to control AUB  Reviewed other options for progesterone only including Depo-Provera, Mirena IUD  Would avoid Nexplanon giving irregular/unpredictable bleeding pattern  Patient is not interested in trying any other progesterone options  Reviewed case with Dr Richard Guerra regarding possible endometrial ablation versus D&C  Chico Martinez Reviewed both of those options with patient in detail  Patient will make appointment with Dr Richard Guerra to discuss her options  Patient denies questions or concerns at conclusion of conversation    Patient was transfer to  to schedule appointment

## 2022-04-04 NOTE — TELEPHONE ENCOUNTER
----- Message from Afshan Greene RN sent at 4/4/2022  9:44 AM EDT -----  Regarding: FW: Possible side effects    ----- Message -----  From: Meryle Kennedy  Sent: 4/2/2022   7:37 PM EDT  To: [facility] [place] [facility]  Subject: Possible side effects                            I started taking the norethindrone on Tuesday  Shortly after taking it I started to get a headache and felt a little nauseous (which I occasionally do with a headace)  Not thinking anything of it  I took Advil and Sudafed  This seemed to occur on Wednesday and Thursday as well  While at work on Friday I was telling a nurse what was going on thinking it could be allergies  She asked if I had started taking any new meds in which I said I did  Upon looking at the side effects of this medication both of those are listed  She told me not to take on Friday to see how I felt  I did not take Friday or Saturday and felt okay  I am not taking this med again  I will call the office on Monday to speak with you

## 2022-04-05 RX ORDER — GABAPENTIN 100 MG/1
CAPSULE ORAL
Qty: 90 CAPSULE | Refills: 0 | Status: SHIPPED | OUTPATIENT
Start: 2022-04-05 | End: 2022-05-03 | Stop reason: SDUPTHER

## 2022-04-07 ENCOUNTER — CLINICAL SUPPORT (OUTPATIENT)
Dept: FAMILY MEDICINE CLINIC | Facility: CLINIC | Age: 47
End: 2022-04-07
Payer: COMMERCIAL

## 2022-04-07 DIAGNOSIS — E53.8 B12 DEFICIENCY: Primary | ICD-10-CM

## 2022-04-07 RX ADMIN — CYANOCOBALAMIN 1000 MCG: 1000 INJECTION INTRAMUSCULAR; SUBCUTANEOUS at 16:29

## 2022-04-12 ENCOUNTER — OFFICE VISIT (OUTPATIENT)
Dept: OBGYN CLINIC | Facility: MEDICAL CENTER | Age: 47
End: 2022-04-12
Payer: COMMERCIAL

## 2022-04-12 ENCOUNTER — PREP FOR PROCEDURE (OUTPATIENT)
Dept: OBGYN CLINIC | Facility: MEDICAL CENTER | Age: 47
End: 2022-04-12

## 2022-04-12 VITALS
SYSTOLIC BLOOD PRESSURE: 130 MMHG | HEIGHT: 70 IN | WEIGHT: 229 LBS | DIASTOLIC BLOOD PRESSURE: 70 MMHG | BODY MASS INDEX: 32.78 KG/M2

## 2022-04-12 DIAGNOSIS — N93.9 ABNORMAL UTERINE BLEEDING (AUB): Primary | ICD-10-CM

## 2022-04-12 PROCEDURE — 3008F BODY MASS INDEX DOCD: CPT | Performed by: OBSTETRICS & GYNECOLOGY

## 2022-04-12 PROCEDURE — 4004F PT TOBACCO SCREEN RCVD TLK: CPT | Performed by: OBSTETRICS & GYNECOLOGY

## 2022-04-12 PROCEDURE — 99214 OFFICE O/P EST MOD 30 MIN: CPT | Performed by: OBSTETRICS & GYNECOLOGY

## 2022-04-12 NOTE — H&P
A/P    1  AUB - she has been bleeding off and on x 2 months some light and other days heavy   She has some cramps   Results below   Bleeding profile   She has tried Progesterone and made her sick   She is smoker so estrogen is not an options   We did discuss other options such as IUD  / ablation / hysterectomy    After the discussion she decided on - ablation   We went over the procedure and the MOA   She is aware of the risk and possibility that wont work and the only option at that point would be a hysterectomy     Sonogram - 2/25/22  FINDINGS:     UTERUS:  The uterus is retroverted in position, measuring 9 8 x 5 1 x 6 2 cm  The uterus has a normal contour and echotexture  There is a 1 4 x 1 0 x 1 5 cm posterior intramural fibroid  The cervix appears within normal limits      ENDOMETRIUM:    The endometrial echo complex is heterogeneous and abnormal in caliber for the patient's age, measuring 20 0 mm         OVARIES/ADNEXA:  Right ovary:  5 1 x 3 5 x 3 4 cm  31 5 mL  No suspicious right ovarian abnormality  There is a 4 1 x 2 7 x 4 2 cm cyst with thin internal septation  No solid vascular component  Doppler flow within normal limits      Left ovary:  2 7 x 1 4 x 2 4 cm  4 7 mL  No suspicious left ovarian abnormality  Doppler flow within normal limits      No suspicious adnexal mass or loculated collections  There is no free fluid  EMBX - 3/16/22    Final Diagnosis   A   Uterine contents, "Endometrium biopsy," Endometrial curettage:  - Proliferative phase endometrium  - Negative for hyperplasia and carcinoma                52 y/o P1  AUB    Past medical / social / surgical / family history reviewed and updated   Medication and allergies discussed in detail and updated       Review of Systems - History obtained from chart review and the patient  General ROS: negative  Psychological ROS: negative  Ophthalmic ROS: negative  ENT ROS: negative  Allergy and Immunology ROS: negative  Hematological and Lymphatic ROS: negative  Endocrine ROS: negative  Breast ROS: negative for breast lumps  Respiratory ROS: no cough, shortness of breath, or wheezing  Cardiovascular ROS: no chest pain or dyspnea on exertion  Gastrointestinal ROS: no abdominal pain, change in bowel habits, or black or bloody stools  Genito-Urinary ROS: no dysuria, trouble voiding, or hematuria vaginal bleeding   Musculoskeletal ROS: negative  Neurological ROS: no TIA or stroke symptoms  Dermatological ROS: negative      Physical Exam  Constitutional:       Appearance: She is well-developed  HENT:      Nose: Nose normal    Eyes:      Conjunctiva/sclera: Conjunctivae normal    Neck:      Thyroid: No thyromegaly  Cardiovascular:      Rate and Rhythm: Normal rate and regular rhythm  Heart sounds: Normal heart sounds  Pulmonary:      Effort: Pulmonary effort is normal  No respiratory distress  Breath sounds: Normal breath sounds  No wheezing  Abdominal:      General: Bowel sounds are normal  There is no distension  Palpations: Abdomen is soft  Musculoskeletal:         General: Normal range of motion  Cervical back: Normal range of motion and neck supple  Skin:     General: Skin is warm  Neurological:      Mental Status: She is alert and oriented to person, place, and time  Psychiatric:         Behavior: Behavior normal          Thought Content:  Thought content normal          Judgment: Judgment normal

## 2022-04-12 NOTE — PROGRESS NOTES
A/P    1  AUB - she has been bleeding off and on x 2 months some light and other days heavy   She has some cramps   Results below   Bleeding profile   She has tried Progesterone and made her sick   She is smoker so estrogen is not an options   We did discuss other options such as IUD  / ablation / hysterectomy    After the discussion she decided on - ablation   We went over the procedure and the MOA   She is aware of the risk and possibility that wont work and the only option at that point would be a hysterectomy     Sonogram - 2/25/22  FINDINGS:     UTERUS:  The uterus is retroverted in position, measuring 9 8 x 5 1 x 6 2 cm  The uterus has a normal contour and echotexture  There is a 1 4 x 1 0 x 1 5 cm posterior intramural fibroid  The cervix appears within normal limits      ENDOMETRIUM:    The endometrial echo complex is heterogeneous and abnormal in caliber for the patient's age, measuring 20 0 mm         OVARIES/ADNEXA:  Right ovary:  5 1 x 3 5 x 3 4 cm  31 5 mL  No suspicious right ovarian abnormality  There is a 4 1 x 2 7 x 4 2 cm cyst with thin internal septation  No solid vascular component  Doppler flow within normal limits      Left ovary:  2 7 x 1 4 x 2 4 cm  4 7 mL  No suspicious left ovarian abnormality  Doppler flow within normal limits      No suspicious adnexal mass or loculated collections  There is no free fluid  EMBX - 3/16/22    Final Diagnosis   A   Uterine contents, "Endometrium biopsy," Endometrial curettage:  - Proliferative phase endometrium  - Negative for hyperplasia and carcinoma                50 y/o P1  AUB    Past medical / social / surgical / family history reviewed and updated   Medication and allergies discussed in detail and updated       Review of Systems - History obtained from chart review and the patient  General ROS: negative  Psychological ROS: negative  Ophthalmic ROS: negative  ENT ROS: negative  Allergy and Immunology ROS: negative  Hematological and Lymphatic ROS: negative  Endocrine ROS: negative  Breast ROS: negative for breast lumps  Respiratory ROS: no cough, shortness of breath, or wheezing  Cardiovascular ROS: no chest pain or dyspnea on exertion  Gastrointestinal ROS: no abdominal pain, change in bowel habits, or black or bloody stools  Genito-Urinary ROS: no dysuria, trouble voiding, or hematuria vaginal bleeding   Musculoskeletal ROS: negative  Neurological ROS: no TIA or stroke symptoms  Dermatological ROS: negative      Physical Exam  Constitutional:       Appearance: She is well-developed  HENT:      Nose: Nose normal    Eyes:      Conjunctiva/sclera: Conjunctivae normal    Neck:      Thyroid: No thyromegaly  Cardiovascular:      Rate and Rhythm: Normal rate and regular rhythm  Heart sounds: Normal heart sounds  Pulmonary:      Effort: Pulmonary effort is normal  No respiratory distress  Breath sounds: Normal breath sounds  No wheezing  Abdominal:      General: Bowel sounds are normal  There is no distension  Palpations: Abdomen is soft  Musculoskeletal:         General: Normal range of motion  Cervical back: Normal range of motion and neck supple  Skin:     General: Skin is warm  Neurological:      Mental Status: She is alert and oriented to person, place, and time  Psychiatric:         Behavior: Behavior normal          Thought Content:  Thought content normal          Judgment: Judgment normal

## 2022-04-19 ENCOUNTER — TELEPHONE (OUTPATIENT)
Dept: OBGYN CLINIC | Facility: MEDICAL CENTER | Age: 47
End: 2022-04-19

## 2022-04-20 ENCOUNTER — PREP FOR PROCEDURE (OUTPATIENT)
Dept: OBGYN CLINIC | Facility: MEDICAL CENTER | Age: 47
End: 2022-04-20

## 2022-04-20 DIAGNOSIS — N93.9 ABNORMAL UTERINE BLEEDING (AUB): Primary | ICD-10-CM

## 2022-04-23 RX ORDER — SODIUM CHLORIDE, SODIUM LACTATE, POTASSIUM CHLORIDE, CALCIUM CHLORIDE 600; 310; 30; 20 MG/100ML; MG/100ML; MG/100ML; MG/100ML
125 INJECTION, SOLUTION INTRAVENOUS CONTINUOUS
Status: CANCELLED | OUTPATIENT
Start: 2022-06-03

## 2022-04-27 ENCOUNTER — TELEPHONE (OUTPATIENT)
Dept: FAMILY MEDICINE CLINIC | Facility: CLINIC | Age: 47
End: 2022-04-27

## 2022-04-27 DIAGNOSIS — J01.90 ACUTE NON-RECURRENT SINUSITIS, UNSPECIFIED LOCATION: Primary | ICD-10-CM

## 2022-04-27 RX ORDER — AZITHROMYCIN 250 MG/1
TABLET, FILM COATED ORAL
Qty: 6 TABLET | Refills: 0 | Status: SHIPPED | OUTPATIENT
Start: 2022-04-27 | End: 2022-05-01

## 2022-04-27 NOTE — TELEPHONE ENCOUNTER
Patient is complaining of a sinus infection  She has pressure in her head and ears  She is wondering if she can have an antibiotic possibly? Please advise

## 2022-05-03 DIAGNOSIS — R20.2 PARESTHESIA: ICD-10-CM

## 2022-05-03 RX ORDER — GABAPENTIN 100 MG/1
CAPSULE ORAL
Qty: 90 CAPSULE | Refills: 0 | Status: SHIPPED | OUTPATIENT
Start: 2022-05-03 | End: 2022-05-31 | Stop reason: SDUPTHER

## 2022-05-09 ENCOUNTER — CLINICAL SUPPORT (OUTPATIENT)
Dept: FAMILY MEDICINE CLINIC | Facility: CLINIC | Age: 47
End: 2022-05-09
Payer: COMMERCIAL

## 2022-05-09 DIAGNOSIS — E53.8 B12 DEFICIENCY: Primary | ICD-10-CM

## 2022-05-09 PROCEDURE — 96372 THER/PROPH/DIAG INJ SC/IM: CPT

## 2022-05-09 RX ADMIN — CYANOCOBALAMIN 1000 MCG: 1000 INJECTION INTRAMUSCULAR; SUBCUTANEOUS at 16:11

## 2022-05-10 ENCOUNTER — OFFICE VISIT (OUTPATIENT)
Dept: OBGYN CLINIC | Facility: MEDICAL CENTER | Age: 47
End: 2022-05-10
Payer: COMMERCIAL

## 2022-05-10 ENCOUNTER — PREP FOR PROCEDURE (OUTPATIENT)
Dept: OBGYN CLINIC | Facility: MEDICAL CENTER | Age: 47
End: 2022-05-10

## 2022-05-10 VITALS
DIASTOLIC BLOOD PRESSURE: 70 MMHG | WEIGHT: 228 LBS | HEIGHT: 70 IN | SYSTOLIC BLOOD PRESSURE: 124 MMHG | BODY MASS INDEX: 32.64 KG/M2

## 2022-05-10 DIAGNOSIS — N93.9 ABNORMAL UTERINE BLEEDING (AUB): Primary | ICD-10-CM

## 2022-05-10 PROCEDURE — 99214 OFFICE O/P EST MOD 30 MIN: CPT | Performed by: OBSTETRICS & GYNECOLOGY

## 2022-05-10 NOTE — H&P (VIEW-ONLY)
A/P    1  AUB - she has been bleeding off and on x 2 months some light and other days heavy   She has some cramps   Results below   Bleeding profile   She has tried Progesterone and made her sick   She is smoker so estrogen is not an options   We did discuss other options such as IUD  / ablation / hysterectomy     After the discussion she decided on - ablation   We went over the procedure and the MOA   She is aware of the risk and possibility that wont work and the only option at that point would be a hysterectomy      Sonogram - 2/25/22  FINDINGS:     UTERUS:  The uterus is retroverted in position, measuring 9 8 x 5 1 x 6 2 cm  The uterus has a normal contour and echotexture  There is a 1 4 x 1 0 x 1 5 cm posterior intramural fibroid  The cervix appears within normal limits      ENDOMETRIUM:    The endometrial echo complex is heterogeneous and abnormal in caliber for the patient's age, measuring 20 0 mm         OVARIES/ADNEXA:  Right ovary:  5 1 x 3 5 x 3 4 cm  31 5 mL  No suspicious right ovarian abnormality    There is a 4 1 x 2 7 x 4 2 cm cyst with thin internal septation   No solid vascular component  Doppler flow within normal limits      Left ovary:  2 7 x 1 4 x 2 4 cm  4 7 mL  No suspicious left ovarian abnormality  Doppler flow within normal limits      No suspicious adnexal mass or loculated collections  There is no free fluid         EMBX - 3/16/22     Final Diagnosis   A  Uterine contents, "Endometrium biopsy," Endometrial curettage:  - Proliferative phase endometrium  - Negative for hyperplasia and carcinoma         We went over the procedure in detail again   She is aware and agreeable to such         50 y/o P1  For ablation     Past medical / social / surgical / family history reviewed and updated   Medication and allergies discussed in detail and updated       There were no vitals taken for this visit      Review of Systems - History obtained from chart review and the patient  General ROS: negative  Psychological ROS: negative  Ophthalmic ROS: negative  ENT ROS: negative  Allergy and Immunology ROS: negative  Hematological and Lymphatic ROS: negative  Endocrine ROS: negative  Breast ROS: negative for breast lumps  Respiratory ROS: no cough, shortness of breath, or wheezing  Cardiovascular ROS: no chest pain or dyspnea on exertion  Gastrointestinal ROS: no abdominal pain, change in bowel habits, or black or bloody stools  Genito-Urinary ROS: no dysuria, trouble voiding, or hematuria + vaginal bleeding   Musculoskeletal ROS: negative  Neurological ROS: no TIA or stroke symptoms  Dermatological ROS: negative        Physical Exam  Constitutional:       Appearance: She is well-developed  HENT:      Nose: Nose normal    Eyes:      Conjunctiva/sclera: Conjunctivae normal    Neck:      Thyroid: No thyromegaly  Cardiovascular:      Rate and Rhythm: Normal rate and regular rhythm  Heart sounds: Normal heart sounds  Pulmonary:      Effort: Pulmonary effort is normal  No respiratory distress  Breath sounds: Normal breath sounds  No wheezing  Abdominal:      General: Bowel sounds are normal  There is no distension  Palpations: Abdomen is soft  Musculoskeletal:         General: Normal range of motion  Cervical back: Normal range of motion and neck supple  Skin:     General: Skin is warm  Neurological:      Mental Status: She is alert and oriented to person, place, and time  Psychiatric:         Behavior: Behavior normal          Thought Content:  Thought content normal          Judgment: Judgment normal

## 2022-05-10 NOTE — PROGRESS NOTES
A/P    1  AUB - she has been bleeding off and on x 2 months some light and other days heavy   She has some cramps   Results below   Bleeding profile   She has tried Progesterone and made her sick   She is smoker so estrogen is not an options   We did discuss other options such as IUD  / ablation / hysterectomy     After the discussion she decided on - ablation   We went over the procedure and the MOA   She is aware of the risk and possibility that wont work and the only option at that point would be a hysterectomy      Sonogram - 2/25/22  FINDINGS:     UTERUS:  The uterus is retroverted in position, measuring 9 8 x 5 1 x 6 2 cm  The uterus has a normal contour and echotexture  There is a 1 4 x 1 0 x 1 5 cm posterior intramural fibroid  The cervix appears within normal limits      ENDOMETRIUM:    The endometrial echo complex is heterogeneous and abnormal in caliber for the patient's age, measuring 20 0 mm         OVARIES/ADNEXA:  Right ovary:  5 1 x 3 5 x 3 4 cm  31 5 mL  No suspicious right ovarian abnormality    There is a 4 1 x 2 7 x 4 2 cm cyst with thin internal septation   No solid vascular component  Doppler flow within normal limits      Left ovary:  2 7 x 1 4 x 2 4 cm  4 7 mL  No suspicious left ovarian abnormality  Doppler flow within normal limits      No suspicious adnexal mass or loculated collections  There is no free fluid         EMBX - 3/16/22     Final Diagnosis   A   Uterine contents, "Endometrium biopsy," Endometrial curettage:  - Proliferative phase endometrium  - Negative for hyperplasia and carcinoma         We went over the procedure in detail again   She is aware and agreeable to such         50 y/o P1  For ablation     Past medical / social / surgical / family history reviewed and updated   Medication and allergies discussed in detail and updated       /70   Ht 5' 10" (1 778 m)   Wt 103 kg (228 lb)   BMI 32 71 kg/m²     Review of Systems - History obtained from chart review and the patient  General ROS: negative  Psychological ROS: negative  Ophthalmic ROS: negative  ENT ROS: negative  Allergy and Immunology ROS: negative  Hematological and Lymphatic ROS: negative  Endocrine ROS: negative  Breast ROS: negative for breast lumps  Respiratory ROS: no cough, shortness of breath, or wheezing  Cardiovascular ROS: no chest pain or dyspnea on exertion  Gastrointestinal ROS: no abdominal pain, change in bowel habits, or black or bloody stools  Genito-Urinary ROS: no dysuria, trouble voiding, or hematuria + vaginal bleeding   Musculoskeletal ROS: negative  Neurological ROS: no TIA or stroke symptoms  Dermatological ROS: negative        Physical Exam  Constitutional:       Appearance: She is well-developed  HENT:      Nose: Nose normal    Eyes:      Conjunctiva/sclera: Conjunctivae normal    Neck:      Thyroid: No thyromegaly  Cardiovascular:      Rate and Rhythm: Normal rate and regular rhythm  Heart sounds: Normal heart sounds  Pulmonary:      Effort: Pulmonary effort is normal  No respiratory distress  Breath sounds: Normal breath sounds  No wheezing  Abdominal:      General: Bowel sounds are normal  There is no distension  Palpations: Abdomen is soft  Musculoskeletal:         General: Normal range of motion  Cervical back: Normal range of motion and neck supple  Skin:     General: Skin is warm  Neurological:      Mental Status: She is alert and oriented to person, place, and time  Psychiatric:         Behavior: Behavior normal          Thought Content:  Thought content normal          Judgment: Judgment normal

## 2022-05-25 ENCOUNTER — ANESTHESIA EVENT (OUTPATIENT)
Dept: PERIOP | Facility: HOSPITAL | Age: 47
End: 2022-05-25
Payer: COMMERCIAL

## 2022-05-25 RX ORDER — FAMOTIDINE, CALCIUM CARBONATE, AND MAGNESIUM HYDROXIDE 10; 800; 165 MG/1; MG/1; MG/1
1 TABLET, CHEWABLE ORAL DAILY PRN
COMMUNITY

## 2022-05-25 RX ORDER — PSEUDOEPHEDRINE HCL 60 MG/1
60 TABLET ORAL EVERY 4 HOURS PRN
COMMUNITY

## 2022-05-25 RX ORDER — IBUPROFEN 200 MG
200-800 TABLET ORAL EVERY 6 HOURS PRN
COMMUNITY

## 2022-05-25 NOTE — PRE-PROCEDURE INSTRUCTIONS
Pre-Surgery Instructions:   Medication Instructions    Cyanocobalamin (B-12) 1000 MCG/ML KIT Hold day of surgery   famotidine-calcium carbonate-magnesium hydroxide (Pepcid Complete) -165 MG CHEW Hold day of surgery   FOLIC ACID PO Hold day of surgery   gabapentin (NEURONTIN) 100 mg capsule Take night before surgery    ibuprofen (MOTRIN) 200 mg tablet Stop taking 3 days prior to surgery   levothyroxine 200 mcg tablet Take day of surgery   levothyroxine 50 mcg tablet Take day of surgery   pseudoephedrine (SUDAFED) 60 mg tablet Hold day of surgery  Verbal pre-op instructions given via phone   Pt verbalizes understanding

## 2022-05-26 ENCOUNTER — OFFICE VISIT (OUTPATIENT)
Dept: FAMILY MEDICINE CLINIC | Facility: CLINIC | Age: 47
End: 2022-05-26
Payer: COMMERCIAL

## 2022-05-26 VITALS
RESPIRATION RATE: 16 BRPM | HEIGHT: 70 IN | OXYGEN SATURATION: 98 % | WEIGHT: 226.2 LBS | TEMPERATURE: 96.8 F | BODY MASS INDEX: 32.38 KG/M2 | SYSTOLIC BLOOD PRESSURE: 128 MMHG | HEART RATE: 91 BPM | DIASTOLIC BLOOD PRESSURE: 74 MMHG

## 2022-05-26 DIAGNOSIS — L29.9 PRURITUS: Primary | ICD-10-CM

## 2022-05-26 DIAGNOSIS — Z91.018 ALLERGY, FOOD: ICD-10-CM

## 2022-05-26 PROCEDURE — 4004F PT TOBACCO SCREEN RCVD TLK: CPT | Performed by: FAMILY MEDICINE

## 2022-05-26 PROCEDURE — 99213 OFFICE O/P EST LOW 20 MIN: CPT | Performed by: FAMILY MEDICINE

## 2022-05-26 PROCEDURE — 3008F BODY MASS INDEX DOCD: CPT | Performed by: FAMILY MEDICINE

## 2022-05-26 RX ORDER — MONTELUKAST SODIUM 10 MG/1
10 TABLET ORAL
Qty: 30 TABLET | Refills: 5 | Status: SHIPPED | OUTPATIENT
Start: 2022-05-26

## 2022-05-27 NOTE — PROGRESS NOTES
Assessment/Plan:    Treat his possible allergic reaction to tomatoes  Would recommend referral to Allergy and immunology for full workup  Cannot tolerate steroids  Recommend nonsedating antihistamine during the day and Benadryl at night  Aveeno for potential topical treatment  Prescription for Singulair written if things get worse over the weekend  ER if worse  Avoid tomatoes until further notice     Diagnoses and all orders for this visit:    Pruritus    Allergy, food  -     montelukast (SINGULAIR) 10 mg tablet; Take 1 tablet (10 mg total) by mouth daily at bedtime  -     Ambulatory Referral to Allergy; Future        1  Pruritus     2  Allergy, food  montelukast (SINGULAIR) 10 mg tablet    Ambulatory Referral to Allergy       Subjective:        Patient ID: Lisa Joyner is a 51 y o  female  Chief Complaint   Patient presents with    Allergic Reaction     food allergy, facial flushing and rash after eating tomato        Patient with history of pruritus  Has been stable for quite some time  Had not seen Allergy and immunology in the past   Did see Derm states she bought different types of tomatoes and shortly thereafter felt funny and has body itching  No rash per se  The following portions of the patient's history were reviewed and updated as appropriate: past medical history, past surgical history and problem list       Review of Systems   Constitutional: Negative for fatigue and fever  HENT: Negative for congestion, ear pain, postnasal drip, rhinorrhea, sinus pressure, sinus pain, sore throat and trouble swallowing  Eyes: Negative for redness  Respiratory: Negative for chest tightness, shortness of breath and wheezing  Cardiovascular: Negative for chest pain, palpitations and leg swelling  Gastrointestinal: Negative for abdominal distention, abdominal pain, diarrhea and nausea  Genitourinary: Negative for hematuria  Musculoskeletal: Negative for arthralgias and myalgias     Skin: Negative for pallor and rash  Neurological: Negative for dizziness, light-headedness and headaches  Psychiatric/Behavioral: The patient is not nervous/anxious  Objective:  /74 (BP Location: Left arm, Patient Position: Sitting, Cuff Size: Adult)   Pulse 91   Temp (!) 96 8 °F (36 °C) (Temporal)   Resp 16   Ht 5' 10" (1 778 m)   Wt 103 kg (226 lb 3 2 oz)   SpO2 98%   BMI 32 46 kg/m²        Physical Exam  Vitals and nursing note reviewed  Constitutional:       General: She is not in acute distress  Appearance: She is obese  HENT:      Head: Normocephalic  Right Ear: Tympanic membrane normal       Left Ear: Tympanic membrane normal       Nose: Nose normal       Mouth/Throat:      Pharynx: Oropharynx is clear  Eyes:      General: No scleral icterus  Pupils: Pupils are equal, round, and reactive to light  Cardiovascular:      Rate and Rhythm: Normal rate and regular rhythm  Heart sounds: Normal heart sounds  No murmur heard  Pulmonary:      Breath sounds: Normal breath sounds  Musculoskeletal:      Right lower leg: No edema  Left lower leg: No edema  Lymphadenopathy:      Cervical: No cervical adenopathy  Skin:     Coloration: Skin is not pale  Findings: No rash  Neurological:      General: No focal deficit present  Mental Status: She is alert and oriented to person, place, and time  Psychiatric:         Behavior: Behavior normal          Thought Content:  Thought content normal

## 2022-05-31 DIAGNOSIS — R20.2 PARESTHESIA: ICD-10-CM

## 2022-06-01 RX ORDER — GABAPENTIN 100 MG/1
CAPSULE ORAL
Qty: 90 CAPSULE | Refills: 0 | Status: SHIPPED | OUTPATIENT
Start: 2022-06-01 | End: 2022-07-05

## 2022-06-02 DIAGNOSIS — E89.0 POSTOPERATIVE HYPOTHYROIDISM: ICD-10-CM

## 2022-06-02 RX ORDER — LEVOTHYROXINE SODIUM 0.05 MG/1
TABLET ORAL
Qty: 90 TABLET | Refills: 0 | Status: SHIPPED | OUTPATIENT
Start: 2022-06-02

## 2022-06-02 RX ORDER — LEVOTHYROXINE SODIUM 0.2 MG/1
TABLET ORAL
Qty: 90 TABLET | Refills: 0 | Status: SHIPPED | OUTPATIENT
Start: 2022-06-02

## 2022-06-03 ENCOUNTER — HOSPITAL ENCOUNTER (OUTPATIENT)
Facility: HOSPITAL | Age: 47
Setting detail: OUTPATIENT SURGERY
Discharge: HOME | End: 2022-06-03
Attending: OBSTETRICS & GYNECOLOGY | Admitting: OBSTETRICS & GYNECOLOGY
Payer: COMMERCIAL

## 2022-06-03 ENCOUNTER — ANESTHESIA (OUTPATIENT)
Dept: PERIOP | Facility: HOSPITAL | Age: 47
End: 2022-06-03
Payer: COMMERCIAL

## 2022-06-03 VITALS
RESPIRATION RATE: 16 BRPM | BODY MASS INDEX: 32.19 KG/M2 | DIASTOLIC BLOOD PRESSURE: 54 MMHG | WEIGHT: 224.87 LBS | SYSTOLIC BLOOD PRESSURE: 121 MMHG | HEIGHT: 70 IN | TEMPERATURE: 97.5 F | HEART RATE: 59 BPM | OXYGEN SATURATION: 97 %

## 2022-06-03 DIAGNOSIS — N93.9 ABNORMAL UTERINE BLEEDING (AUB): Primary | ICD-10-CM

## 2022-06-03 PROBLEM — Z98.890 S/P D&C (STATUS POST DILATION AND CURETTAGE): Status: ACTIVE | Noted: 2022-06-03

## 2022-06-03 PROBLEM — Z98.890 S/P ENDOMETRIAL ABLATION: Status: ACTIVE | Noted: 2022-06-03

## 2022-06-03 LAB
BASOPHILS # BLD AUTO: 0.09 THOUSANDS/ΜL (ref 0–0.1)
BASOPHILS NFR BLD AUTO: 1 % (ref 0–1)
EOSINOPHIL # BLD AUTO: 0.22 THOUSAND/ΜL (ref 0–0.61)
EOSINOPHIL NFR BLD AUTO: 2 % (ref 0–6)
ERYTHROCYTE [DISTWIDTH] IN BLOOD BY AUTOMATED COUNT: 17.4 % (ref 11.6–15.1)
EXT PREGNANCY TEST URINE: NEGATIVE
EXT. CONTROL: NORMAL
HCT VFR BLD AUTO: 31.8 % (ref 34.8–46.1)
HGB BLD-MCNC: 9.2 G/DL (ref 11.5–15.4)
IMM GRANULOCYTES # BLD AUTO: 0.03 THOUSAND/UL (ref 0–0.2)
IMM GRANULOCYTES NFR BLD AUTO: 0 % (ref 0–2)
LYMPHOCYTES # BLD AUTO: 2.22 THOUSANDS/ΜL (ref 0.6–4.47)
LYMPHOCYTES NFR BLD AUTO: 24 % (ref 14–44)
MCH RBC QN AUTO: 19.8 PG (ref 26.8–34.3)
MCHC RBC AUTO-ENTMCNC: 28.9 G/DL (ref 31.4–37.4)
MCV RBC AUTO: 69 FL (ref 82–98)
MONOCYTES # BLD AUTO: 0.74 THOUSAND/ΜL (ref 0.17–1.22)
MONOCYTES NFR BLD AUTO: 8 % (ref 4–12)
NEUTROPHILS # BLD AUTO: 5.99 THOUSANDS/ΜL (ref 1.85–7.62)
NEUTS SEG NFR BLD AUTO: 65 % (ref 43–75)
NRBC BLD AUTO-RTO: 0 /100 WBCS
PLATELET # BLD AUTO: 306 THOUSANDS/UL (ref 149–390)
PMV BLD AUTO: 10.4 FL (ref 8.9–12.7)
RBC # BLD AUTO: 4.64 MILLION/UL (ref 3.81–5.12)
WBC # BLD AUTO: 9.29 THOUSAND/UL (ref 4.31–10.16)

## 2022-06-03 PROCEDURE — 85025 COMPLETE CBC W/AUTO DIFF WBC: CPT | Performed by: OBSTETRICS & GYNECOLOGY

## 2022-06-03 PROCEDURE — 58563 HYSTEROSCOPY ABLATION: CPT | Performed by: OBSTETRICS & GYNECOLOGY

## 2022-06-03 PROCEDURE — 88305 TISSUE EXAM BY PATHOLOGIST: CPT | Performed by: PATHOLOGY

## 2022-06-03 PROCEDURE — 81025 URINE PREGNANCY TEST: CPT | Performed by: OBSTETRICS & GYNECOLOGY

## 2022-06-03 RX ORDER — SODIUM CHLORIDE 9 MG/ML
125 INJECTION, SOLUTION INTRAVENOUS CONTINUOUS
Status: DISCONTINUED | OUTPATIENT
Start: 2022-06-03 | End: 2022-06-03 | Stop reason: HOSPADM

## 2022-06-03 RX ORDER — MIDAZOLAM HYDROCHLORIDE 2 MG/2ML
INJECTION, SOLUTION INTRAMUSCULAR; INTRAVENOUS AS NEEDED
Status: DISCONTINUED | OUTPATIENT
Start: 2022-06-03 | End: 2022-06-03

## 2022-06-03 RX ORDER — DEXAMETHASONE SODIUM PHOSPHATE 10 MG/ML
INJECTION, SOLUTION INTRAMUSCULAR; INTRAVENOUS AS NEEDED
Status: DISCONTINUED | OUTPATIENT
Start: 2022-06-03 | End: 2022-06-03

## 2022-06-03 RX ORDER — ONDANSETRON 2 MG/ML
4 INJECTION INTRAMUSCULAR; INTRAVENOUS EVERY 6 HOURS PRN
Status: DISCONTINUED | OUTPATIENT
Start: 2022-06-03 | End: 2022-06-03 | Stop reason: HOSPADM

## 2022-06-03 RX ORDER — PROPOFOL 10 MG/ML
INJECTION, EMULSION INTRAVENOUS AS NEEDED
Status: DISCONTINUED | OUTPATIENT
Start: 2022-06-03 | End: 2022-06-03

## 2022-06-03 RX ORDER — FENTANYL CITRATE/PF 50 MCG/ML
25 SYRINGE (ML) INJECTION
Status: DISCONTINUED | OUTPATIENT
Start: 2022-06-03 | End: 2022-06-03 | Stop reason: HOSPADM

## 2022-06-03 RX ORDER — ONDANSETRON 2 MG/ML
4 INJECTION INTRAMUSCULAR; INTRAVENOUS ONCE AS NEEDED
Status: DISCONTINUED | OUTPATIENT
Start: 2022-06-03 | End: 2022-06-03 | Stop reason: HOSPADM

## 2022-06-03 RX ORDER — ONDANSETRON 2 MG/ML
INJECTION INTRAMUSCULAR; INTRAVENOUS AS NEEDED
Status: DISCONTINUED | OUTPATIENT
Start: 2022-06-03 | End: 2022-06-03

## 2022-06-03 RX ORDER — LIDOCAINE HYDROCHLORIDE 20 MG/ML
INJECTION, SOLUTION EPIDURAL; INFILTRATION; INTRACAUDAL; PERINEURAL AS NEEDED
Status: DISCONTINUED | OUTPATIENT
Start: 2022-06-03 | End: 2022-06-03

## 2022-06-03 RX ORDER — FENTANYL CITRATE 50 UG/ML
INJECTION, SOLUTION INTRAMUSCULAR; INTRAVENOUS AS NEEDED
Status: DISCONTINUED | OUTPATIENT
Start: 2022-06-03 | End: 2022-06-03

## 2022-06-03 RX ORDER — ACETAMINOPHEN 500 MG
1000 TABLET ORAL EVERY 6 HOURS PRN
Qty: 30 TABLET | Refills: 0 | Status: SHIPPED | OUTPATIENT
Start: 2022-06-03

## 2022-06-03 RX ORDER — KETOROLAC TROMETHAMINE 30 MG/ML
INJECTION, SOLUTION INTRAMUSCULAR; INTRAVENOUS AS NEEDED
Status: DISCONTINUED | OUTPATIENT
Start: 2022-06-03 | End: 2022-06-03

## 2022-06-03 RX ORDER — ACETAMINOPHEN 325 MG/1
975 TABLET ORAL EVERY 6 HOURS PRN
Status: DISCONTINUED | OUTPATIENT
Start: 2022-06-03 | End: 2022-06-03 | Stop reason: HOSPADM

## 2022-06-03 RX ADMIN — FENTANYL CITRATE 50 MCG: 50 INJECTION INTRAMUSCULAR; INTRAVENOUS at 09:23

## 2022-06-03 RX ADMIN — FENTANYL CITRATE 50 MCG: 50 INJECTION INTRAMUSCULAR; INTRAVENOUS at 09:32

## 2022-06-03 RX ADMIN — LIDOCAINE HYDROCHLORIDE 80 MG: 20 INJECTION, SOLUTION EPIDURAL; INFILTRATION; INTRACAUDAL; PERINEURAL at 09:23

## 2022-06-03 RX ADMIN — PROPOFOL 200 MG: 10 INJECTION, EMULSION INTRAVENOUS at 09:23

## 2022-06-03 RX ADMIN — ONDANSETRON 4 MG: 2 INJECTION INTRAMUSCULAR; INTRAVENOUS at 09:30

## 2022-06-03 RX ADMIN — SODIUM CHLORIDE 125 ML/HR: 0.9 INJECTION, SOLUTION INTRAVENOUS at 08:24

## 2022-06-03 RX ADMIN — DEXAMETHASONE SODIUM PHOSPHATE 4 MG: 10 INJECTION INTRAMUSCULAR; INTRAVENOUS at 09:30

## 2022-06-03 RX ADMIN — KETOROLAC TROMETHAMINE 30 MG: 30 INJECTION, SOLUTION INTRAMUSCULAR at 09:42

## 2022-06-03 RX ADMIN — MIDAZOLAM 2 MG: 1 INJECTION INTRAMUSCULAR; INTRAVENOUS at 09:22

## 2022-06-03 NOTE — ANESTHESIA POSTPROCEDURE EVALUATION
Post-Op Assessment Note    CV Status:  Stable    Pain management: adequate     Mental Status:  Alert and awake   Hydration Status:  Euvolemic   PONV Controlled:  Controlled   Airway Patency:  Patent      Post Op Vitals Reviewed: Yes      Staff: Anesthesiologist         No complications documented      BP      Temp      Pulse     Resp      SpO2      /54   Pulse 59   Temp 97 5 °F (36 4 °C) (Temporal)   Resp 16   Ht 5' 10" (1 778 m)   Wt 102 kg (224 lb 13 9 oz)   SpO2 97%   Breastfeeding No   BMI 32 27 kg/m²

## 2022-06-03 NOTE — ANESTHESIA PREPROCEDURE EVALUATION
Procedure:  D&C WITH HYSTEROSCOPY (N/A Uterus)  NOVASURE (N/A Uterus)    Relevant Problems   ENDO   (+) Postoperative hypothyroidism      GI/HEPATIC   (+) Gastroesophageal reflux disease without esophagitis      MUSCULOSKELETAL   (+) Sciatica      NEURO/PSYCH   (+) Paresthesia of bilateral legs   (+) Paresthesia of foot, bilateral      Nervous and Auditory   (+) Lumbar radiculopathy      Other   (+) Obesity   (+) Tobacco use        Physical Exam    Airway    Mallampati score: II  TM Distance: >3 FB  Neck ROM: full     Dental   No notable dental hx     Cardiovascular  Rhythm: regular, Rate: normal, Cardiovascular exam normal    Pulmonary  Pulmonary exam normal Breath sounds clear to auscultation,     Other Findings        Anesthesia Plan  ASA Score- 2     Anesthesia Type- general with ASA Monitors  Additional Monitors:   Airway Plan: LMA  Plan Factors-Exercise tolerance (METS): >4 METS  Chart reviewed  Existing labs reviewed  Patient summary reviewed  Patient is a current smoker  Patient instructed to abstain from smoking on day of procedure  Patient smoked on day of surgery  Induction- intravenous  Postoperative Plan-     Informed Consent- Anesthetic plan and risks discussed with patient

## 2022-06-03 NOTE — INTERVAL H&P NOTE
H&P reviewed  After examining the patient I find no changes in the patients condition since the H&P had been written      Vitals:    06/03/22 0803   BP: 130/63   Pulse: 74   Resp: 14   Temp: 98 5 °F (36 9 °C)   SpO2: 97%       For ablation   Understands the procedure and plan for ablation   Aware the could not work and then again aware will need hysterectomy

## 2022-06-03 NOTE — OP NOTE
OPERATIVE REPORT  PATIENT NAME: Zohra Bridges    :  1975  MRN: [id number]  Pt Location: [facility] OR ROOM 02    SURGERY DATE: 6/3/2022    Surgeon(s) and Role:     * Pedro Pate MD - Primary     * Lorin Willett MD - Assisting    Preop Diagnosis:  Abnormal uterine bleeding (AUB) [N93 9]    Post-Op Diagnosis Codes:     * Abnormal uterine bleeding (AUB) [N93 9]    Procedure(s) (LRB):  D&C WITH HYSTEROSCOPY (N/A)  NOVASURE (N/A)    Specimen(s):  ID Type Source Tests Collected by Time Destination   1 : endocervical currettings Tissue Cervix, Endocervical TISSUE EXAM Pedro Pate MD 6/3/2022 0940    2 : endometrial currettings Tissue Endometrium TISSUE EXAM Pedro Pate MD 6/3/2022 4566        Estimated Blood Loss:   5cc    Drains:  * No LDAs found *    Anesthesia Type:   General LMA    Operative Indications:  Abnormal uterine bleeding (AUB) [N93 9]    Operative Findings:  Normal appearing external female genitalia  1-2cm dilated cervix with retroverted uterus on bimanual exam  Benign appearing endometrial cavity with no apparent polyps, fibroids, or masses  Endocervical and endometrial curettage completed, samples sent to pathology  Endometrial ablation completed for 72 seconds    Complications:   None    Procedure and Technique:  Patient was taken to the operating room  General LMA anesthesia (LMA) was administered and the patient was positioned on the OR table in the dorsal lithotomy position  All pressure points were padded and a abdirahman hugger was placed to maintain control of core body temperature  A bimanual exam was performed and the uterus was noted to be retroverted, normal in size and consistency with no palpable adnexal masses or fullness  The patient was prepped and draped in the usual sterile fashion  A time out was performed to confirm correct patient and correct procedure  A straight catheter was introduced into the bladder, which was drained of 50cc of clear yellow urine   A weighted speculum was inserted into the vagina and a David retractor was used to visualize the anterior lip of the cervix, which was then grasped with a single toothed tenaculum  The uterus was sounded to 9cm and the cervical length was measured at 4cm  Endocervical curettage was completed with the Kevorkian curette  The cervix was serially dilated using Hendrickson dilators for introduction of the hysteroscope  Hysteroscope was introduced under direct visualization using normal saline solution as the distention media  Hysteroscope was advanced to the uterine fundus and the entire uterine cavity was inspected in a systematic manner  There was noted to be benign proliferative tissue with no apparent polyps, fibroids, or masses within the uterine cavity  Hysteroscope was withdrawn and sharp curetting was performed, starting at the 12'oclock position and rotating a total of 360 degrees to cover all surfaces  Endometrial tissue was obtained and sent for pathology  The Novasure device was selected and calibrated according to patient's parameters  Uterine cavity was set to 5cm  Device passed cavity check and width was noted at 4 6cm  Ablation process occurred at 127W for a total of 72 seconds  Charred tissue was appreciated at the fanned tip on removal of the device  The single toothed tenaculum was removed from the anterior lip of the cervix  Good hemostasis was confirmed at the tenaculum sites  All instrumentation was then removed from the vagina  At the conclusion of the procedure, all needle, sponge, and instrument counts were noted to be correct x2  Dr Amparo Ceballos was present and participated in all key portions of the case              Patient Disposition:  PACU       SIGNATURE: April Lara MD  DATE: Cindy 3, 2022  TIME: 10:02 AM

## 2022-06-09 ENCOUNTER — CLINICAL SUPPORT (OUTPATIENT)
Dept: FAMILY MEDICINE CLINIC | Facility: CLINIC | Age: 47
End: 2022-06-09
Payer: COMMERCIAL

## 2022-06-09 DIAGNOSIS — E53.8 B12 DEFICIENCY: ICD-10-CM

## 2022-06-09 PROCEDURE — 96372 THER/PROPH/DIAG INJ SC/IM: CPT

## 2022-06-09 RX ADMIN — CYANOCOBALAMIN 1000 MCG: 1000 INJECTION INTRAMUSCULAR; SUBCUTANEOUS at 16:08

## 2022-06-21 ENCOUNTER — OFFICE VISIT (OUTPATIENT)
Dept: OBGYN CLINIC | Facility: MEDICAL CENTER | Age: 47
End: 2022-06-21

## 2022-06-21 VITALS
BODY MASS INDEX: 32.21 KG/M2 | HEIGHT: 70 IN | DIASTOLIC BLOOD PRESSURE: 72 MMHG | WEIGHT: 225 LBS | SYSTOLIC BLOOD PRESSURE: 120 MMHG

## 2022-06-21 DIAGNOSIS — Z98.890 S/P ENDOMETRIAL ABLATION: Primary | ICD-10-CM

## 2022-06-21 PROCEDURE — 3008F BODY MASS INDEX DOCD: CPT | Performed by: FAMILY MEDICINE

## 2022-06-21 PROCEDURE — 99024 POSTOP FOLLOW-UP VISIT: CPT | Performed by: OBSTETRICS & GYNECOLOGY

## 2022-06-21 NOTE — PROGRESS NOTES
Ablation- 6/3/22    Final Diagnosis   A  Cervix, Endocervical, endocervical currettings:  Clumps of bland endocervical cells and metaplastic squamous cells (no dysplasia)      B  Endometrium, endometrial currettings:  Mildly dyssynchronous proliferative/secretory endometrium,      With focal cystic change (no atypia)     Clumps of benign endocervical cells and metaplastic squamous cells             Today reports - only occasional spotting doing well   No pain   Will cont to monitor and let me know

## 2022-07-02 DIAGNOSIS — R20.2 PARESTHESIA: ICD-10-CM

## 2022-07-05 RX ORDER — GABAPENTIN 100 MG/1
CAPSULE ORAL
Qty: 90 CAPSULE | Refills: 0 | Status: SHIPPED | OUTPATIENT
Start: 2022-07-05 | End: 2022-08-25

## 2022-07-07 ENCOUNTER — CLINICAL SUPPORT (OUTPATIENT)
Dept: FAMILY MEDICINE CLINIC | Facility: CLINIC | Age: 47
End: 2022-07-07
Payer: COMMERCIAL

## 2022-07-07 DIAGNOSIS — E53.8 B12 DEFICIENCY: ICD-10-CM

## 2022-07-07 PROCEDURE — 96372 THER/PROPH/DIAG INJ SC/IM: CPT

## 2022-07-07 RX ADMIN — CYANOCOBALAMIN 1000 MCG: 1000 INJECTION, SOLUTION INTRAMUSCULAR; SUBCUTANEOUS at 16:15

## 2022-07-15 ENCOUNTER — OFFICE VISIT (OUTPATIENT)
Dept: FAMILY MEDICINE CLINIC | Facility: CLINIC | Age: 47
End: 2022-07-15
Payer: COMMERCIAL

## 2022-07-15 ENCOUNTER — APPOINTMENT (OUTPATIENT)
Dept: LAB | Facility: CLINIC | Age: 47
End: 2022-07-15
Payer: COMMERCIAL

## 2022-07-15 VITALS
SYSTOLIC BLOOD PRESSURE: 104 MMHG | HEART RATE: 74 BPM | BODY MASS INDEX: 31.61 KG/M2 | OXYGEN SATURATION: 99 % | TEMPERATURE: 97.7 F | RESPIRATION RATE: 16 BRPM | HEIGHT: 70 IN | DIASTOLIC BLOOD PRESSURE: 78 MMHG | WEIGHT: 220.8 LBS

## 2022-07-15 DIAGNOSIS — E89.0 POSTOPERATIVE HYPOTHYROIDISM: ICD-10-CM

## 2022-07-15 DIAGNOSIS — L29.9 PRURITUS: ICD-10-CM

## 2022-07-15 DIAGNOSIS — R20.2 PARESTHESIA OF BILATERAL LEGS: ICD-10-CM

## 2022-07-15 DIAGNOSIS — R20.2 PARESTHESIA OF BILATERAL LEGS: Primary | ICD-10-CM

## 2022-07-15 DIAGNOSIS — E53.1 VITAMIN B6 DEFICIENCY: ICD-10-CM

## 2022-07-15 DIAGNOSIS — R73.01 ELEVATED FASTING BLOOD SUGAR: ICD-10-CM

## 2022-07-15 DIAGNOSIS — Z23 ENCOUNTER FOR IMMUNIZATION: ICD-10-CM

## 2022-07-15 DIAGNOSIS — N95.9 MENOPAUSAL DISORDER: ICD-10-CM

## 2022-07-15 LAB
25(OH)D3 SERPL-MCNC: 22.6 NG/ML (ref 30–100)
EST. AVERAGE GLUCOSE BLD GHB EST-MCNC: 123 MG/DL
HBA1C MFR BLD: 5.9 %
T4 FREE SERPL-MCNC: 1.8 NG/DL (ref 0.76–1.46)
TSH SERPL DL<=0.05 MIU/L-ACNC: 0.13 UIU/ML (ref 0.45–4.5)
VIT B12 SERPL-MCNC: 960 PG/ML (ref 100–900)

## 2022-07-15 PROCEDURE — 82306 VITAMIN D 25 HYDROXY: CPT

## 2022-07-15 PROCEDURE — 84439 ASSAY OF FREE THYROXINE: CPT

## 2022-07-15 PROCEDURE — 99214 OFFICE O/P EST MOD 30 MIN: CPT | Performed by: FAMILY MEDICINE

## 2022-07-15 PROCEDURE — 84443 ASSAY THYROID STIM HORMONE: CPT

## 2022-07-15 PROCEDURE — 83036 HEMOGLOBIN GLYCOSYLATED A1C: CPT

## 2022-07-15 PROCEDURE — 36415 COLL VENOUS BLD VENIPUNCTURE: CPT

## 2022-07-15 PROCEDURE — 84207 ASSAY OF VITAMIN B-6: CPT

## 2022-07-15 PROCEDURE — 82607 VITAMIN B-12: CPT

## 2022-07-17 NOTE — PROGRESS NOTES
Assessment/Plan:  Patient status post allergy evaluation for her constant symptoms of burning and uncomfortable skin and hot flashes  Up-to-date with gyn  Allergy workup was negative  Reviewed in full  Doubt workup with dermatology is going to show anything different  Recommend endocrinology evaluation for possible home oral issues related to perimenopause  Have also asked the patient to call her gynecologist to potentially review this  Gabapentin is not helping therefore recommend slow titration off down from 3-2 to 1 over period of 3 weeks  Recommend starting aspirin baby then increase to a full adult aspirin in 2 weeks as recommended by Allergy and immunology  Otherwise will be recheck q 6 months with regards to thyroid  TSH running a little low therefore recommend cutting back by holding 50 mcg 2 days weekly  Labs ordered to be recheck  Await Endocrinology evaluation  Diagnoses and all orders for this visit:    Paresthesia of bilateral legs  -     Vitamin D 25 hydroxy; Future    Pruritus  -     Vitamin B12; Future  -     Vitamin B6; Future    Menopausal disorder  -     Ambulatory Referral to Endocrinology; Future    Postoperative hypothyroidism  -     TSH, 3rd generation; Future  -     T4, free; Future    Vitamin B6 deficiency  -     Vitamin B12; Future  -     Vitamin B6; Future    Elevated fasting blood sugar  -     Hemoglobin A1C; Future    Encounter for immunization        1  Paresthesia of bilateral legs  Vitamin D 25 hydroxy   2  Pruritus  Vitamin B12    Vitamin B6   3  Menopausal disorder  Ambulatory Referral to Endocrinology   4  Postoperative hypothyroidism  TSH, 3rd generation    T4, free   5  Vitamin B6 deficiency  Vitamin B12    Vitamin B6   6  Elevated fasting blood sugar  Hemoglobin A1C   7  Encounter for immunization         Subjective:        Patient ID: Beryl Linda is a 51 y o  female    Chief Complaint   Patient presents with    Follow-up     6 month follow up     Physical Exam       Patient here for recheck  Saw Allergy and immunology  Workup negative  ?  Menopausal issue  Patient continues now with continued symptoms of hot flashes and subjective burning in the facial upper neck area as well as lower leg area      The following portions of the patient's history were reviewed and updated as appropriate: past medical history, past surgical history and problem list       Review of Systems   Constitutional: Negative for appetite change, fatigue, fever and unexpected weight change  HENT: Negative for congestion, ear pain, postnasal drip, rhinorrhea, sinus pressure, sinus pain and sore throat  Eyes: Negative for redness and visual disturbance  Respiratory: Negative for chest tightness and shortness of breath  Cardiovascular: Negative for chest pain, palpitations and leg swelling  Gastrointestinal: Negative for abdominal distention, abdominal pain, diarrhea and nausea  Endocrine: Negative for cold intolerance and heat intolerance  Genitourinary: Negative for dysuria and hematuria  Musculoskeletal: Negative for arthralgias, gait problem and myalgias  Skin: Negative for pallor and rash  Neurological: Negative for dizziness, tremors, weakness, light-headedness and headaches  Psychiatric/Behavioral: Negative for behavioral problems  The patient is not nervous/anxious  Objective:  /78 (BP Location: Left arm, Patient Position: Sitting, Cuff Size: Adult)   Pulse 74   Temp 97 7 °F (36 5 °C) (Temporal)   Resp 16   Ht 5' 10" (1 778 m)   Wt 100 kg (220 lb 12 8 oz)   LMP 06/03/2022 (Exact Date)   SpO2 99%   BMI 31 68 kg/m²        Physical Exam  Vitals and nursing note reviewed  Constitutional:       General: She is not in acute distress  Appearance: She is not diaphoretic  HENT:      Head: Normocephalic and atraumatic  Eyes:      General: No scleral icterus       Conjunctiva/sclera: Conjunctivae normal       Pupils: Pupils are equal, round, and reactive to light  Neck:      Thyroid: No thyromegaly  Cardiovascular:      Rate and Rhythm: Normal rate and regular rhythm  Pulses:           Carotid pulses are 0 on the right side and 0 on the left side  Heart sounds: Normal heart sounds  No murmur heard  Pulmonary:      Effort: Pulmonary effort is normal  No respiratory distress  Breath sounds: Normal breath sounds  No wheezing  Abdominal:      General: There is no distension  Palpations: Abdomen is soft  Musculoskeletal:      Cervical back: Normal range of motion and neck supple  Right lower leg: No edema  Left lower leg: No edema  Lymphadenopathy:      Cervical: No cervical adenopathy  Skin:     General: Skin is warm  Coloration: Skin is not pale  Neurological:      General: No focal deficit present  Mental Status: She is alert and oriented to person, place, and time  Cranial Nerves: No cranial nerve deficit  Deep Tendon Reflexes: Reflexes are normal and symmetric  Psychiatric:         Mood and Affect: Mood normal          Behavior: Behavior normal          Thought Content:  Thought content normal          Judgment: Judgment normal

## 2022-07-22 LAB — VIT B6 SERPL-MCNC: 6.9 UG/L (ref 3.4–65.2)

## 2022-07-26 DIAGNOSIS — J32.4 PANSINUSITIS, UNSPECIFIED CHRONICITY: Primary | ICD-10-CM

## 2022-07-26 NOTE — TELEPHONE ENCOUNTER
Patient called she is having problems with her sinus' and think it is an infection  She is having pressure around the right eye area down into her throat and gums  Mild sore throat  She was wondering if  a z-pac could possible be sent it?  Please advise

## 2022-07-27 RX ORDER — AZITHROMYCIN 250 MG/1
TABLET, FILM COATED ORAL
Qty: 6 TABLET | Refills: 0 | Status: SHIPPED | OUTPATIENT
Start: 2022-07-27 | End: 2022-08-01

## 2022-08-22 ENCOUNTER — OFFICE VISIT (OUTPATIENT)
Dept: FAMILY MEDICINE CLINIC | Facility: CLINIC | Age: 47
End: 2022-08-22
Payer: COMMERCIAL

## 2022-08-22 VITALS
WEIGHT: 218.8 LBS | OXYGEN SATURATION: 97 % | BODY MASS INDEX: 31.32 KG/M2 | DIASTOLIC BLOOD PRESSURE: 78 MMHG | HEART RATE: 80 BPM | TEMPERATURE: 97.7 F | SYSTOLIC BLOOD PRESSURE: 130 MMHG | RESPIRATION RATE: 16 BRPM | HEIGHT: 70 IN

## 2022-08-22 DIAGNOSIS — E89.0 POSTOPERATIVE HYPOTHYROIDISM: Primary | ICD-10-CM

## 2022-08-22 DIAGNOSIS — R20.2 PARESTHESIA: ICD-10-CM

## 2022-08-22 DIAGNOSIS — E66.09 CLASS 1 OBESITY DUE TO EXCESS CALORIES WITHOUT SERIOUS COMORBIDITY WITH BODY MASS INDEX (BMI) OF 31.0 TO 31.9 IN ADULT: ICD-10-CM

## 2022-08-22 DIAGNOSIS — E53.8 B12 DEFICIENCY: ICD-10-CM

## 2022-08-22 DIAGNOSIS — R20.8 BURNING SENSATION OF SKIN: ICD-10-CM

## 2022-08-22 PROCEDURE — 96372 THER/PROPH/DIAG INJ SC/IM: CPT | Performed by: FAMILY MEDICINE

## 2022-08-22 PROCEDURE — 99214 OFFICE O/P EST MOD 30 MIN: CPT | Performed by: FAMILY MEDICINE

## 2022-08-22 RX ORDER — CYANOCOBALAMIN 1000 UG/ML
1000 INJECTION, SOLUTION INTRAMUSCULAR; SUBCUTANEOUS
Status: SHIPPED | OUTPATIENT
Start: 2022-08-22

## 2022-08-22 RX ADMIN — CYANOCOBALAMIN 1000 MCG: 1000 INJECTION, SOLUTION INTRAMUSCULAR; SUBCUTANEOUS at 17:10

## 2022-08-25 PROBLEM — R29.898 ARM HEAVINESS: Status: RESOLVED | Noted: 2019-12-09 | Resolved: 2022-08-25

## 2022-08-25 RX ADMIN — CYANOCOBALAMIN 1000 MCG: 1000 INJECTION, SOLUTION INTRAMUSCULAR; SUBCUTANEOUS at 10:19

## 2022-08-25 NOTE — PROGRESS NOTES
Assessment/Plan:  Blood pressure stable  Patient with continued intermittent burning sensation of skin  Never went for MRI of the brain with regards to [address]  MRI brain or reordered  Pending results may need neurological referral   Has seen Allergy and immunology  No large it cause noted for this burning sensation  Refer to Dermatology for further evaluation  Recommend update TSH sed rate C-reactive protein TERESA  Otherwise recheck q 6 months  Recommend flu shot in the fall and COVID vaccine in the fall  No problem-specific Assessment & Plan notes found for this encounter  Diagnoses and all orders for this visit:    Postoperative hypothyroidism  -     TSH, 3rd generation; Future    Paresthesia  -     MRI brain MS wo and w contrast; Future  -     Ambulatory Referral to Dermatology; Future    Burning sensation of skin  -     MRI brain MS wo and w contrast; Future  -     Ambulatory Referral to Dermatology; Future  -     TERESA Screen w/ Reflex to Titer/Pattern; Future  -     C-reactive protein; Future  -     Sedimentation rate, automated; Future    B12 deficiency  -     cyanocobalamin injection 1,000 mcg    Class 1 obesity due to excess calories without serious comorbidity with body mass index (BMI) of 31 0 to 31 9 in adult        1  Postoperative hypothyroidism  TSH, 3rd generation   2  Paresthesia  MRI brain MS wo and w contrast    Ambulatory Referral to Dermatology   3  Burning sensation of skin  MRI brain MS wo and w contrast    Ambulatory Referral to Dermatology    TERESA Screen w/ Reflex to Titer/Pattern    C-reactive protein    Sedimentation rate, automated   4  B12 deficiency  cyanocobalamin injection 1,000 mcg   5  Class 1 obesity due to excess calories without serious comorbidity with body mass index (BMI) of 31 0 to 31 9 in adult         Subjective:        Patient ID: Vignesh Jewell is a 51 y o  female    Chief Complaint   Patient presents with    Skin Problem     Patient states she feels a burning sensation on her skin        Recheck  History of burning skin sensation  States has to take 2 showers during the night to feel better and she has her legs every day to relieve the sensation  Has seen Neurology and Allergy and immunology  The following portions of the patient's history were reviewed and updated as appropriate: past medical history, past surgical history and problem list       Review of Systems   Constitutional: Negative for appetite change, fatigue, fever and unexpected weight change  HENT: Negative for congestion, ear pain, postnasal drip, rhinorrhea, sinus pressure, sinus pain and sore throat  Eyes: Negative for redness and visual disturbance  Respiratory: Negative for chest tightness and shortness of breath  Cardiovascular: Negative for chest pain, palpitations and leg swelling  Gastrointestinal: Negative for abdominal distention, abdominal pain, diarrhea and nausea  Endocrine: Negative for cold intolerance and heat intolerance  Genitourinary: Negative for dysuria and hematuria  Musculoskeletal: Negative for arthralgias, gait problem and myalgias  Skin: Negative for pallor and rash  Burning skin sensation   Neurological: Negative for dizziness, tremors, weakness, light-headedness and headaches  Psychiatric/Behavioral: Negative for behavioral problems  The patient is not nervous/anxious  Objective:  /78 (BP Location: Left arm, Patient Position: Sitting, Cuff Size: Adult)   Pulse 80   Temp 97 7 °F (36 5 °C) (Temporal)   Resp 16   Ht 5' 10" (1 778 m)   Wt 99 2 kg (218 lb 12 8 oz)   LMP 08/04/2022 (Exact Date)   SpO2 97%   BMI 31 39 kg/m²        Physical Exam  Vitals and nursing note reviewed  Constitutional:       General: She is not in acute distress  Appearance: Normal appearance  She is obese  She is not diaphoretic  HENT:      Head: Normocephalic and atraumatic  Eyes:      General: No scleral icterus       Conjunctiva/sclera: Conjunctivae normal       Pupils: Pupils are equal, round, and reactive to light  Neck:      Thyroid: No thyromegaly  Cardiovascular:      Rate and Rhythm: Normal rate and regular rhythm  Pulses:           Carotid pulses are 0 on the right side and 0 on the left side  Heart sounds: Normal heart sounds  No murmur heard  Pulmonary:      Effort: Pulmonary effort is normal  No respiratory distress  Breath sounds: Normal breath sounds  No wheezing  Abdominal:      General: There is no distension  Palpations: Abdomen is soft  Musculoskeletal:      Cervical back: Normal range of motion and neck supple  Right lower leg: No edema  Left lower leg: No edema  Lymphadenopathy:      Cervical: No cervical adenopathy  Skin:     General: Skin is warm  Coloration: Skin is not pale  Findings: No erythema, lesion or rash  Neurological:      General: No focal deficit present  Mental Status: She is alert and oriented to person, place, and time  Cranial Nerves: No cranial nerve deficit  Deep Tendon Reflexes: Reflexes are normal and symmetric  Psychiatric:         Mood and Affect: Mood normal          Behavior: Behavior normal          Thought Content:  Thought content normal          Judgment: Judgment normal

## 2022-10-18 ENCOUNTER — CLINICAL SUPPORT (OUTPATIENT)
Dept: FAMILY MEDICINE CLINIC | Facility: CLINIC | Age: 47
End: 2022-10-18
Payer: COMMERCIAL

## 2022-10-18 DIAGNOSIS — E53.8 B12 DEFICIENCY: Primary | ICD-10-CM

## 2022-10-18 PROCEDURE — 96372 THER/PROPH/DIAG INJ SC/IM: CPT

## 2022-10-18 RX ADMIN — CYANOCOBALAMIN 1000 MCG: 1000 INJECTION, SOLUTION INTRAMUSCULAR; SUBCUTANEOUS at 08:42

## 2022-11-02 ENCOUNTER — TELEPHONE (OUTPATIENT)
Dept: DERMATOLOGY | Facility: CLINIC | Age: 47
End: 2022-11-02

## 2022-11-08 ENCOUNTER — CONSULT (OUTPATIENT)
Dept: DERMATOLOGY | Facility: CLINIC | Age: 47
End: 2022-11-08

## 2022-11-08 ENCOUNTER — TELEPHONE (OUTPATIENT)
Dept: FAMILY MEDICINE CLINIC | Facility: CLINIC | Age: 47
End: 2022-11-08

## 2022-11-08 VITALS — BODY MASS INDEX: 31.73 KG/M2 | WEIGHT: 221.6 LBS | TEMPERATURE: 98.4 F | HEIGHT: 70 IN

## 2022-11-08 DIAGNOSIS — R20.8 BURNING SENSATION OF SKIN: ICD-10-CM

## 2022-11-08 DIAGNOSIS — R20.2 PARESTHESIA: ICD-10-CM

## 2022-11-08 DIAGNOSIS — F41.9 ANXIETY: Primary | ICD-10-CM

## 2022-11-08 DIAGNOSIS — Z78.9 NORMAL SKIN APPEARANCE: Primary | ICD-10-CM

## 2022-11-08 RX ORDER — ALPRAZOLAM 0.25 MG/1
TABLET ORAL
Qty: 2 TABLET | Refills: 0 | Status: SHIPPED | OUTPATIENT
Start: 2022-11-08

## 2022-11-08 NOTE — PROGRESS NOTES
[facility] Note     Patient Name: Ming Dye  Encounter Date: 11/8/2022     Have you been cared for by a [place] [address] Dermatologist in the last 3 years and, if so, which description applies to you? NO  I am considered a "new" patient and must complete all patient intake questions  I am FEMALE/of child-bearing potential     REVIEW OF SYSTEMS:  Have you recently had or currently have any of the following? · Recent fever or chills? No  · Any non-healing wound? No  · Are you pregnant or planning to become pregnant? No  · Are you currently or planning to be nursing or breast feeding? No   PAST MEDICAL HISTORY:  Have you personally ever had or currently have any of the following? If "YES," then please provide more detail  · Skin cancer (such as Melanoma, Basal Cell Carcinoma, Squamous Cell Carcinoma? No  · Tuberculosis, HIV/AIDS, Hepatitis B or C: No  · Systemic Immunosuppression such as Diabetes, Biologic or Immunotherapy, Chemotherapy, Organ Transplantation, Bone Marrow Transplantation No  · Radiation Treatment No   FAMILY HISTORY:  Any "first degree relatives" (parent, brother, sister, or child) with the following? • Skin Cancer, Pancreatic or Other Cancer? No   PATIENT EXPERIENCE:    • Do you want the Dermatologist to perform a COMPLETE skin exam today including a clinical examination under the "bra and underwear" areas? • If necessary, do we have your permission to call and leave a detailed message on your Preferred Phone number that includes your specific medical information?         Allergies   Allergen Reactions   • Lamisil [Terbinafine]    • Prednisone      Rapid heart beat and palpitations       Current Outpatient Medications:   •  acetaminophen (TYLENOL) 500 mg tablet, Take 2 tablets (1,000 mg total) by mouth every 6 (six) hours as needed for mild pain, Disp: 30 tablet, Rfl: 0  •  Cyanocobalamin (B-12) 1000 MCG/ML KIT, Inject as directed every 30 (thirty) days, Disp: , Rfl:   • famotidine-calcium carbonate-magnesium hydroxide (Pepcid Complete) -165 MG CHEW, Chew 1 tablet daily as needed for heartburn, Disp: , Rfl:   •  FOLIC ACID PO, Take by mouth daily As needed , Disp: , Rfl:   •  levothyroxine 200 mcg tablet, TAKE 1 TABLET BY MOUTH EVERY DAY, Disp: 90 tablet, Rfl: 0  •  levothyroxine 50 mcg tablet, TAKE 1 TABLET BY MOUTH EVERY DAY, Disp: 90 tablet, Rfl: 0  •  pseudoephedrine (SUDAFED) 60 mg tablet, Take 60 mg by mouth every 4 (four) hours as needed for congestion, Disp: , Rfl:     Current Facility-Administered Medications:   •  cyanocobalamin injection 1,000 mcg, 1,000 mcg, Intramuscular, F00 Days, Sharad Crate, DO, 3,496 mcg at 10/18/22 0057          • Whom besides the patient is providing clinical information about today's encounter? o     Physical Exam and Assessment/Plan by Diagnosis:      BURNING SENSATION OF LEGS     Physical Exam:  • Anatomic Location Affected:  Legs   • Morphological Description:  Clear, normal skin appearance  • Pertinent Positives:  • Pertinent Negatives: Additional History of Present Condition:  Patient present to the office with complains of burning sensation of legs for about 5 years  She states that this issue is affecting her sleeping  She recently had blood work  Assessment and Plan:  Based on a thorough discussion of this condition and the management approach to it (including a comprehensive discussion of the known risks, side effects and potential benefits of treatment), the patient (family) agrees to implement the following specific plan:  • No evidence of a primary cutaneous disorder  • Referral to Neurology for evaluation and treatment  • Recommended to go for the MRI as already scheduled  • Recent blood work completed and ordered by Dr Denys Alva was reviewed in the office    •   Scribe Attestation    I,:  Bjorn Cohen am acting as a scribe while in the presence of the attending physician :       I,:  Rubio Mar MD personally performed the services described in this documentation    as scribed in my presence :

## 2022-11-11 ENCOUNTER — HOSPITAL ENCOUNTER (OUTPATIENT)
Dept: RADIOLOGY | Facility: HOSPITAL | Age: 47
Discharge: HOME | End: 2022-11-11

## 2022-11-11 DIAGNOSIS — R20.2 PARESTHESIA: ICD-10-CM

## 2022-11-11 DIAGNOSIS — R20.8 BURNING SENSATION OF SKIN: ICD-10-CM

## 2022-11-16 ENCOUNTER — TELEPHONE (OUTPATIENT)
Dept: FAMILY MEDICINE CLINIC | Facility: CLINIC | Age: 47
End: 2022-11-16

## 2022-11-17 DIAGNOSIS — R20.2 PARESTHESIA: Primary | ICD-10-CM

## 2022-11-17 DIAGNOSIS — R20.8 BURNING SENSATION OF SKIN: ICD-10-CM

## 2022-11-17 DIAGNOSIS — R20.2 PARESTHESIA OF BILATERAL LEGS: ICD-10-CM

## 2022-11-18 ENCOUNTER — TELEMEDICINE (OUTPATIENT)
Dept: FAMILY MEDICINE CLINIC | Facility: CLINIC | Age: 47
End: 2022-11-18

## 2022-11-18 DIAGNOSIS — J02.9 PHARYNGITIS, UNSPECIFIED ETIOLOGY: Primary | ICD-10-CM

## 2022-11-18 NOTE — PROGRESS NOTES
COVID-19 Outpatient Progress Note    Assessment/Plan:    Problem List Items Addressed This Visit    None     Disposition:     After clarifying the patient's history, my suspicion for COVID-19 infection is very low  Discussed symptom directed medication options with patient  Rapid covid test was negative at work today     I have spent 8 minutes directly with the patient  Greater than 50% of this time was spent in counseling/coordination of care regarding: diagnostic results, prognosis, risks and benefits of treatment options, instructions for management, patient and family education, importance of treatment compliance, risk factor reductions and impressions  Encounter provider: Shakila Lopez DO     Provider located at: [address] [place] [address] PRIMARY CARE  [address] [facility] [address]  [address]  [facility] [address] [phone]     Recent Visits  Date Type Provider Dept   11/16/22 Telephone 2020 [facility]   Showing recent visits within past 7 days and meeting all other requirements  Future Appointments  No visits were found meeting these conditions  Showing future appointments within next 150 days and meeting all other requirements       Patient agrees to participate in a virtual check in via telephone or video visit instead of presenting to the office to address urgent/immediate medical needs  Patient is aware this is a billable service  She acknowledged consent and understanding of privacy and security of the video platform  The patient has agreed to participate and understands they can discontinue the visit at any time  After connecting through [facility], the patient was identified by name and date of birth  Lucho Atkinson was informed that this was a telemedicine visit and that the exam was being conducted confidentially over secure lines  My office door was closed  No one else was in the room   Lucho Atkinson acknowledged consent and understanding of privacy and security of the telemedicine visit  I informed the patient that I have reviewed her record in Epic and presented the opportunity for her to ask any questions regarding the visit today  The patient agreed to participate  Verification of patient location:  Patient is located in the following state in which I hold an active license: [place]    Subjective: Tiffani Mortensen is a 51 y o  female who is concerned about COVID-19  Patient's symptoms include fatigue, malaise, nasal congestion, sore throat, myalgias and headache  Patient denies fever, chills, rhinorrhea, anosmia, loss of taste, cough, shortness of breath, chest tightness, abdominal pain, nausea, vomiting and diarrhea  - Date of symptom onset: 11/17/2022      COVID-19 vaccination status: Partially vaccinated    Exposure:   Contact with a person who is under investigation (PUI) for or who is positive for COVID-19 within the last 14 days?: No    Hospitalized recently for fever and/or lower respiratory symptoms?: No      Currently a healthcare worker that is involved in direct patient care?: No      Works in a special setting where the risk of COVID-19 transmission may be high? (this may include long-term care, correctional and assisted facilities; homeless shelters; assisted-living facilities and group homes ): No      Resident in a special setting where the risk of COVID-19 transmission may be high? (this may include long-term care, correctional and assisted facilities; homeless shelters; assisted-living facilities and group homes ): No      Lab Results   Component Value Date    SARSCOV2 Negative 01/21/2021       Review of Systems   Constitutional: Positive for fatigue  Negative for chills and fever  HENT: Positive for congestion and sore throat  Negative for rhinorrhea  Respiratory: Negative for cough, chest tightness and shortness of breath  Gastrointestinal: Negative for abdominal pain, diarrhea, nausea and vomiting  Musculoskeletal: Positive for myalgias  Neurological: Positive for headaches  Current Outpatient Medications on File Prior to Visit   Medication Sig   • acetaminophen (TYLENOL) 500 mg tablet Take 2 tablets (1,000 mg total) by mouth every 6 (six) hours as needed for mild pain   • ALPRAZolam (XANAX) 0 25 mg tablet Take 1 tablet half an hour prior to MRI  May repeat 2nd dose at MRI if needed  • Cyanocobalamin (B-12) 1000 MCG/ML KIT Inject as directed every 30 (thirty) days   • famotidine-calcium carbonate-magnesium hydroxide (Pepcid Complete) -165 MG CHEW Chew 1 tablet daily as needed for heartburn   • FOLIC ACID PO Take by mouth daily As needed    • levothyroxine 200 mcg tablet TAKE 1 TABLET BY MOUTH EVERY DAY   • levothyroxine 50 mcg tablet TAKE 1 TABLET BY MOUTH EVERY DAY   • pseudoephedrine (SUDAFED) 60 mg tablet Take 60 mg by mouth every 4 (four) hours as needed for congestion       Objective: There were no vitals taken for this visit  Physical Exam  HENT:      Head: Normocephalic  Right Ear: External ear normal       Left Ear: External ear normal       Mouth/Throat:      Pharynx: Oropharyngeal exudate present  Eyes:      Extraocular Movements: Extraocular movements intact  Conjunctiva/sclera: Conjunctivae normal       Pupils: Pupils are equal, round, and reactive to light  Pulmonary:      Effort: Pulmonary effort is normal    Neurological:      General: No focal deficit present  Mental Status: She is alert and oriented to person, place, and time     Psychiatric:         Mood and Affect: Mood normal          Behavior: Behavior normal        Ru Forrester DO

## 2022-11-21 ENCOUNTER — CLINICAL SUPPORT (OUTPATIENT)
Dept: FAMILY MEDICINE CLINIC | Facility: CLINIC | Age: 47
End: 2022-11-21

## 2022-11-21 DIAGNOSIS — E53.8 B12 DEFICIENCY: Primary | ICD-10-CM

## 2022-11-21 RX ADMIN — CYANOCOBALAMIN 1000 MCG: 1000 INJECTION, SOLUTION INTRAMUSCULAR; SUBCUTANEOUS at 16:03

## 2022-11-28 ENCOUNTER — APPOINTMENT (OUTPATIENT)
Dept: RADIOLOGY | Facility: MEDICAL CENTER | Age: 47
End: 2022-11-28

## 2022-11-28 ENCOUNTER — OFFICE VISIT (OUTPATIENT)
Dept: URGENT CARE | Facility: MEDICAL CENTER | Age: 47
End: 2022-11-28

## 2022-11-28 VITALS
HEART RATE: 64 BPM | DIASTOLIC BLOOD PRESSURE: 81 MMHG | OXYGEN SATURATION: 100 % | RESPIRATION RATE: 18 BRPM | TEMPERATURE: 98.4 F | SYSTOLIC BLOOD PRESSURE: 138 MMHG

## 2022-11-28 DIAGNOSIS — R10.12 LEFT UPPER QUADRANT ABDOMINAL PAIN: Primary | ICD-10-CM

## 2022-11-28 DIAGNOSIS — R10.12 LEFT UPPER QUADRANT ABDOMINAL PAIN: ICD-10-CM

## 2022-11-28 RX ORDER — DOCUSATE SODIUM 250 MG
250 CAPSULE ORAL DAILY
Qty: 10 CAPSULE | Refills: 0 | Status: SHIPPED | OUTPATIENT
Start: 2022-11-28

## 2022-11-28 NOTE — PATIENT INSTRUCTIONS
use medication as directed   increase fluids  Follow up with PCP in 3-5 days  Proceed to  ER if symptoms worsen  Constipation   AMBULATORY CARE:   Constipation  means you have hard, dry bowel movements, or you go longer than usual between bowel movements  Constipation may be caused by a lack of water or high-fiber foods  Certain medicines, or a lack of fiber or physical activity may also cause constipation  Common symptoms include the following:   Trouble pushing out your bowel movement    Pain or bleeding during your bowel movement    A feeling that you did not finish having your bowel movement    Nausea    Bloating    Headache    Call your doctor if:   You have blood in your bowel movements  You have a fever and abdominal pain with the constipation  Your constipation gets worse  You start to vomit  You have questions or concerns about your condition or care  Relieve constipation:  Medicine can keep you have a bowel movement more easily  Medicines may increase moisture in your bowel movement or increase the motion of your intestines  A suppository  may be used to help soften your bowel movements  This may make them easier to pass  A suppository is guided into your rectum through your anus  Laxatives  can help stimulate your bowels to have a bowel movement  Your provider may recommend you only use laxatives for a short time  Long-term use may make your bowels dependent on the medicine  An enema  is liquid medicine used to clear bowel movement from your rectum  The medicine is put into your rectum through your anus  Prevent constipation:   Drink liquids as directed  You may need to drink extra liquids to help soften and move your bowels  Ask how much liquid to drink each day and which liquids are best for you  Eat high-fiber foods  This may help decrease constipation by adding bulk to your bowel movements   High-fiber foods include fruit, vegetables, whole-grain breads and cereals, and beans  Your healthcare provider or dietitian can help you create a high-fiber meal plan  Your provider may also recommend a fiber supplement if you cannot get enough fiber from food  Exercise regularly  Regular physical activity can help stimulate your intestines  Walking is a good exercise to prevent or relieve constipation  Ask which exercises are best for you  Schedule a time each day to have a bowel movement  This may help train your body to have regular bowel movements  Bend forward while you are on the toilet to help move the bowel movement out  Sit on the toilet for at least 10 minutes, even if you do not have a bowel movement  Talk to your healthcare provider about your medicines  Certain medicines, such as opioids, can cause constipation  Your provider may be able to make medicine changes  For example, he or she may change the kind of medicine, or change when you take it  Follow up with your doctor as directed:  Write down your questions so you remember to ask them during your visits  © [employer] 2022 Information is for End User's use only and may not be sold, redistributed or otherwise used for commercial purposes  All illustrations and images included in CareNotes® are the copyrighted property of A D A M , Inc  or [facility]  The above information is an  only  It is not intended as medical advice for individual conditions or treatments  Talk to your doctor, nurse or pharmacist before following any medical regimen to see if it is safe and effective for you  Abdominal Pain   AMBULATORY CARE:   Abdominal pain  can be dull, achy, or sharp  You may have pain in one area of your abdomen, or in your entire abdomen  Your pain may be caused by a condition such as constipation, food sensitivity or poisoning, infection, or a blockage  Abdominal pain can also be from a hernia, appendicitis, or an ulcer   Liver, gallbladder, or kidney conditions can also cause abdominal pain  The cause of your abdominal pain may not be known  Call your local emergency number (911 in the [address]) if:   You have new chest pain or shortness of breath  Seek care immediately if:   You have pulsing pain in your upper abdomen or lower back that suddenly becomes constant  Your pain is in the right lower abdominal area and worsens with movement  You have a fever over 100 4°F (38°C) or shaking chills  You are vomiting and cannot keep food or liquids down  Your pain does not improve or gets worse over the next 8 to 12 hours  You see blood in your vomit or bowel movements, or they look black and tarry  Your skin or the whites of your eyes turn yellow  You are a woman and have a large amount of vaginal bleeding that is not your monthly period  Call your doctor if:   You have pain in your lower back  You are a man and have pain in your testicles  You have pain when you urinate  You have questions or concerns about your condition or care  Treatment for abdominal pain  may include any of the following:  Prescription pain medicine  may be given  Ask your healthcare provider how to take this medicine safely  Some prescription pain medicines contain acetaminophen  Do not take other medicines that contain acetaminophen without talking to your healthcare provider  Too much acetaminophen may cause liver damage  Prescription pain medicine may cause constipation  Ask your healthcare provider how to prevent or treat constipation  Medicines  may be given to calm your stomach or prevent vomiting  Relaxation therapy  may be used along with pain medicine  Surgery  may be needed, depending on the cause  Manage your symptoms:   Apply heat  on your abdomen for 20 to 30 minutes every 2 hours for as many days as directed  Heat helps decrease pain and muscle spasms  Make changes to the food you eat, if needed    Do not eat foods that cause abdominal pain or other symptoms  Eat small meals more often  The following changes may also help:    Eat more high-fiber foods if you are constipated  High-fiber foods include fruits, vegetables, whole-grain foods, and legumes  Do not eat foods that cause gas if you have bloating  Examples include broccoli, cabbage, and cauliflower  Do not drink soda or carbonated drinks  These may also cause gas  Do not eat foods or drinks that contain sorbitol or fructose if you have diarrhea and bloating  Some examples are fruit juices, candy, jelly, and sugar-free gum  Do not eat high-fat foods  Examples include fried foods, cheeseburgers, hot dogs, and desserts  Limit or do not have caffeine  Caffeine may make symptoms such as heartburn or nausea worse  Drink more liquids to prevent dehydration from diarrhea or vomiting  Ask your healthcare provider how much liquid to drink each day and which liquids are best for you  Keep a diary of your abdominal pain  A diary may help your healthcare provider learn what is causing your abdominal pain  Include when the pain happens, how long it lasts, and what the pain feels like  Write down any other symptoms you have with abdominal pain  Also write down what you eat, and what symptoms you have after you eat  Manage your stress  Stress may cause abdominal pain  Your healthcare provider may recommend relaxation techniques and deep breathing exercises to help decrease your stress  Your healthcare provider may recommend you talk to someone about your stress or anxiety, such as a counselor or a trusted friend  Get plenty of sleep and exercise regularly  Limit or do not drink alcohol  Alcohol can make your abdominal pain worse  Ask your healthcare provider if it is safe for you to drink alcohol  Also ask how much is safe for you to drink  Do not smoke  Nicotine and other chemicals in cigarettes can damage your esophagus and stomach   Ask your healthcare provider for information if you currently smoke and need help to quit  E-cigarettes or smokeless tobacco still contain nicotine  Talk to your healthcare provider before you use these products  Follow up with your doctor within 24 hours or as directed:  Write down your questions so you remember to ask them during your visits  © Copyright [employer] 2022 Information is for End User's use only and may not be sold, redistributed or otherwise used for commercial purposes  All illustrations and images included in CareNotes® are the copyrighted property of [employer]  or [facility]   The above information is an  only  It is not intended as medical advice for individual conditions or treatments  Talk to your doctor, nurse or pharmacist before following any medical regimen to see if it is safe and effective for you

## 2022-11-28 NOTE — PROGRESS NOTES
[facility]        NAME: Irma Allred is a 51 y o  female  : 1975    MRN: [id number]  DATE: 2022  TIME: 4:51 PM    Assessment and Plan   Left upper quadrant abdominal pain [R10 12]  1  Left upper quadrant abdominal pain  XR abdomen obstruction series    docusate sodium (COLACE) 250 MG capsule            Patient Instructions      use medication as directed   increase fluids  Follow up with PCP in 3-5 days  Proceed to  ER if symptoms worsen  Chief Complaint     Chief Complaint   Patient presents with   • Abdominal Pain     Patient c/o LLQ pain , fullness when eating, and bloating x 1 week          History of Present Illness       41-year-old female presents with left-sided abdominal pain  Patient reports symptoms have been waxing waning for the past week  Reports the fullness sensation  Patient reports that when she eats something she feels very full and distended  Still has passing gas  Patient still is having bowel movements  She reports her bowel movements are little abnormal cousins small amounts multiple times a day  Denies any vomiting  No fevers or chills  No chest pain shortness of breath or cough  Abdominal Pain  This is a new problem  The current episode started 1 to 4 weeks ago  The onset quality is gradual  The problem occurs constantly  The problem has been waxing and waning  The pain is located in the LLQ and LUQ  The pain is moderate  The quality of the pain is aching and a sensation of fullness  The abdominal pain does not radiate  Associated symptoms include constipation and flatus  Pertinent negatives include no anorexia, diarrhea, dysuria, fever, frequency, headaches, hematochezia, hematuria, melena, myalgias, nausea or vomiting  The pain is aggravated by eating  The pain is relieved by nothing  She has tried nothing for the symptoms  The treatment provided no relief  There is no history of abdominal surgery         Review of Systems   Review of Systems   Constitutional: Negative  Negative for fever  HENT: Negative  Eyes: Negative  Respiratory: Negative  Cardiovascular: Negative  Gastrointestinal: Positive for abdominal pain, constipation and flatus  Negative for anorexia, diarrhea, hematochezia, melena, nausea and vomiting  Genitourinary: Negative for dysuria, frequency and hematuria  Musculoskeletal: Negative  Negative for myalgias  Skin: Negative  Neurological: Negative  Negative for headaches  Current Medications       Current Outpatient Medications:   •  docusate sodium (COLACE) 250 MG capsule, Take 1 capsule (250 mg total) by mouth daily, Disp: 10 capsule, Rfl: 0  •  acetaminophen (TYLENOL) 500 mg tablet, Take 2 tablets (1,000 mg total) by mouth every 6 (six) hours as needed for mild pain, Disp: 30 tablet, Rfl: 0  •  ALPRAZolam (XANAX) 0 25 mg tablet, Take 1 tablet half an hour prior to MRI  May repeat 2nd dose at MRI if needed  , Disp: 2 tablet, Rfl: 0  •  Cyanocobalamin (B-12) 1000 MCG/ML KIT, Inject as directed every 30 (thirty) days, Disp: , Rfl:   •  famotidine-calcium carbonate-magnesium hydroxide (Pepcid Complete) -165 MG CHEW, Chew 1 tablet daily as needed for heartburn, Disp: , Rfl:   •  FOLIC ACID PO, Take by mouth daily As needed , Disp: , Rfl:   •  levothyroxine 200 mcg tablet, TAKE 1 TABLET BY MOUTH EVERY DAY, Disp: 90 tablet, Rfl: 0  •  levothyroxine 50 mcg tablet, TAKE 1 TABLET BY MOUTH EVERY DAY, Disp: 90 tablet, Rfl: 0  •  pseudoephedrine (SUDAFED) 60 mg tablet, Take 60 mg by mouth every 4 (four) hours as needed for congestion, Disp: , Rfl:     Current Facility-Administered Medications:   •  cyanocobalamin injection 1,000 mcg, 1,000 mcg, Intramuscular, D58 Days, Karlasa France, DO, 1,586 mcg at 11/21/22 1603    Current Allergies     Allergies as of 11/28/2022 - Reviewed 11/28/2022   Allergen Reaction Noted   • Lamisil [terbinafine]  02/27/2015   • Prednisone  05/02/2018            The following portions of the patient's history were reviewed and updated as appropriate: allergies, current medications, past family history, past medical history, past social history, past surgical history and problem list      Past Medical History:   Diagnosis Date   • Abnormal Pap smear of cervix     with ASUS favoring benign, last assessed 1/9/14   • Abnormal uterine bleeding (AUB)     ongoing menses x  4 months- D&C/ablation today 6/3/2022   • Disease of thyroid gland     total thyroidectomy- goiter   • Mild acid reflux    • Paresthesia of foot, bilateral    • Sinus headache    • Tobacco abuse        Past Surgical History:   Procedure Laterality Date   • BREAST BIOPSY Left 2011    benign   • COLONOSCOPY     • HI HYSTEROSCOPY,W/ENDO BX N/A 6/3/2022    Procedure: D&C WITH HYSTEROSCOPY;  Surgeon: Amira Gore MD;  Location: [facility];  Service: Gynecology   • HI HYSTEROSCOPY,W/ENDOMETRIAL ABLATION N/A 6/3/2022    Procedure: Kiley Regalado;  Surgeon: Amira Gore MD;  Location: [facility];  Service: Gynecology   • TONSILLECTOMY AND ADENOIDECTOMY     • TOTAL THYROIDECTOMY N/A 06/21/2014    Dr Jerod Russell   • US GUIDED BREAST BIOPSY LEFT COMPLETE Left        Family History   Problem Relation Age of Onset   • Diabetes Son    • Diabetes type II Mother    • Hypertension Father         benign essential   • No Known Problems Maternal Grandmother    • Heart attack Maternal Grandfather    • Heart disease Maternal Grandfather    • Dementia Paternal Grandmother    • No Known Problems Paternal Grandfather    • No Known Problems Maternal Aunt    • Breast cancer Maternal Aunt 68   • No Known Problems Maternal Aunt    • Diabetes Brother    • Colon cancer Neg Hx    • Ovarian cancer Neg Hx          Medications have been verified  Objective   /81   Pulse 64   Temp 98 4 °F (36 9 °C)   Resp 18   SpO2 100%   No LMP recorded  Physical Exam     Physical Exam  Vitals and nursing note reviewed     Constitutional:       General: She is not in acute distress  Appearance: Normal appearance  She is well-developed  HENT:      Head: Normocephalic and atraumatic  Right Ear: Hearing, tympanic membrane, ear canal and external ear normal  There is no impacted cerumen  Left Ear: Hearing, tympanic membrane, ear canal and external ear normal  There is no impacted cerumen  Nose: Nose normal       Mouth/Throat:      Pharynx: Uvula midline  No oropharyngeal exudate  Eyes:      General:         Right eye: No discharge  Left eye: No discharge  Conjunctiva/sclera: Conjunctivae normal    Cardiovascular:      Rate and Rhythm: Normal rate and regular rhythm  Heart sounds: Normal heart sounds  No murmur heard  Pulmonary:      Effort: Pulmonary effort is normal  No respiratory distress  Breath sounds: Normal breath sounds  No wheezing or rales  Abdominal:      General: Bowel sounds are normal       Palpations: Abdomen is soft  Tenderness: There is abdominal tenderness (Mild to moderate pain palpation) in the left upper quadrant and left lower quadrant  There is no right CVA tenderness, left CVA tenderness, guarding or rebound  Negative signs include Smalls's sign, Rovsing's sign, McBurney's sign and psoas sign  Musculoskeletal:         General: Normal range of motion  Cervical back: Normal range of motion and neck supple  Lymphadenopathy:      Cervical: No cervical adenopathy  Skin:     General: Skin is warm and dry  Neurological:      Mental Status: She is alert and oriented to person, place, and time  Psychiatric:         Mood and Affect: Mood normal          Obstruction series reviewed  No abnormal gas fluid levels    Some fecal stasis

## 2022-12-08 NOTE — RESULT NOTES
Message   TSH is high, is she taking her thyroid medicine daily and alone on empty stomach? Rec  office visit to discuss  She is also rubella immune  Verified Results  (1) TSH 22Jan2016 11:32AM [facility]   TW Order Number: [id number]    Patients undergoing fluorescein dye angiography may retain small amounts of fluorescein in the body for 48-72 hours post procedure  Samples containing fluorescein can produce falsely depressed TSH values  If the patient had this procedure,a specimen should be resubmitted post fluorescein clearance          The recommended reference ranges for TSH during pregnancy are as follows:  First trimester 0 1 to 2 5 uIU/mL  Second trimester  0 2 to 3 0 uIU/mL  Third trimester 0 3 to 3 0 uIU/m     Test Name Result Flag Reference   TSH 9 760 uIU/mL H 0 358-3 740     (1) T4, FREE [id number] 11:32AM [facility]   TW Order Number: [id number]     Test Name Result Flag Reference   T4,FREE 1 05 ng/dL  0 76-1 46     (1) RUBELLA [facility] [id number] 11:32AM [facility]   TW Order Number: [id number]    TW Order Number: [id number] Order Number: [id number] Order Number: [id number]     Test Name Result Flag Reference   RUBELLA (IGG) 39 9 IU/mL  >9 9 Hydroxychloroquine Counseling:  I discussed with the patient that a baseline ophthalmologic exam is needed at the start of therapy and every year thereafter while on therapy. A CBC may also be warranted for monitoring.  The side effects of this medication were discussed with the patient, including but not limited to agranulocytosis, aplastic anemia, seizures, rashes, retinopathy, and liver toxicity. Patient instructed to call the office should any adverse effect occur.  The patient verbalized understanding of the proper use and possible adverse effects of Plaquenil.  All the patient's questions and concerns were addressed.

## 2022-12-19 ENCOUNTER — CLINICAL SUPPORT (OUTPATIENT)
Dept: FAMILY MEDICINE CLINIC | Facility: CLINIC | Age: 47
End: 2022-12-19

## 2022-12-19 DIAGNOSIS — E53.8 VITAMIN B 12 DEFICIENCY: Primary | ICD-10-CM

## 2022-12-19 RX ADMIN — CYANOCOBALAMIN 1000 MCG: 1000 INJECTION, SOLUTION INTRAMUSCULAR; SUBCUTANEOUS at 11:16

## 2023-01-18 ENCOUNTER — RA CDI HCC (OUTPATIENT)
Dept: OTHER | Facility: HOSPITAL | Age: 48
End: 2023-01-18

## 2023-01-18 NOTE — PROGRESS NOTES
[facility]  coding opportunities       Chart reviewed, no opportunity found: CHART REVIEWED, NO OPPORTUNITY FOUND        Patients Insurance        Commercial Insurance: [employer]

## 2023-01-19 ENCOUNTER — APPOINTMENT (OUTPATIENT)
Dept: LAB | Facility: CLINIC | Age: 48
End: 2023-01-19

## 2023-01-19 ENCOUNTER — CLINICAL SUPPORT (OUTPATIENT)
Dept: FAMILY MEDICINE CLINIC | Facility: CLINIC | Age: 48
End: 2023-01-19

## 2023-01-19 DIAGNOSIS — E89.0 POSTOPERATIVE HYPOTHYROIDISM: ICD-10-CM

## 2023-01-19 DIAGNOSIS — R20.8 BURNING SENSATION OF SKIN: ICD-10-CM

## 2023-01-19 DIAGNOSIS — E53.1 VITAMIN B6 DEFICIENCY: Primary | ICD-10-CM

## 2023-01-19 LAB
ANA SER QL IA: NEGATIVE
CRP SERPL QL: 3.9 MG/L
ERYTHROCYTE [SEDIMENTATION RATE] IN BLOOD: 25 MM/HOUR (ref 0–19)
TSH SERPL DL<=0.05 MIU/L-ACNC: 1.87 UIU/ML (ref 0.45–4.5)

## 2023-01-19 RX ADMIN — CYANOCOBALAMIN 1000 MCG: 1000 INJECTION, SOLUTION INTRAMUSCULAR; SUBCUTANEOUS at 15:01

## 2023-01-25 ENCOUNTER — OFFICE VISIT (OUTPATIENT)
Dept: FAMILY MEDICINE CLINIC | Facility: CLINIC | Age: 48
End: 2023-01-25

## 2023-01-25 VITALS
DIASTOLIC BLOOD PRESSURE: 82 MMHG | SYSTOLIC BLOOD PRESSURE: 120 MMHG | WEIGHT: 218 LBS | HEIGHT: 70 IN | BODY MASS INDEX: 31.21 KG/M2

## 2023-01-25 DIAGNOSIS — G25.81 RLS (RESTLESS LEGS SYNDROME): ICD-10-CM

## 2023-01-25 DIAGNOSIS — Z12.31 ENCOUNTER FOR SCREENING MAMMOGRAM FOR BREAST CANCER: ICD-10-CM

## 2023-01-25 DIAGNOSIS — M79.609 PARESTHESIA AND PAIN OF EXTREMITY: ICD-10-CM

## 2023-01-25 DIAGNOSIS — R20.2 PARESTHESIA AND PAIN OF EXTREMITY: ICD-10-CM

## 2023-01-25 DIAGNOSIS — E89.0 POSTOPERATIVE HYPOTHYROIDISM: Primary | ICD-10-CM

## 2023-01-25 DIAGNOSIS — R73.01 ELEVATED FASTING BLOOD SUGAR: ICD-10-CM

## 2023-01-25 DIAGNOSIS — Z23 ENCOUNTER FOR IMMUNIZATION: ICD-10-CM

## 2023-01-25 DIAGNOSIS — Z12.31 BREAST CANCER SCREENING BY MAMMOGRAM: ICD-10-CM

## 2023-01-25 DIAGNOSIS — E55.9 VITAMIN D DEFICIENCY: ICD-10-CM

## 2023-01-25 DIAGNOSIS — E53.8 VITAMIN B 12 DEFICIENCY: ICD-10-CM

## 2023-01-25 RX ORDER — ROPINIROLE 0.25 MG/1
0.25 TABLET, FILM COATED ORAL
Qty: 30 TABLET | Refills: 2 | Status: SHIPPED | OUTPATIENT
Start: 2023-01-25

## 2023-01-29 NOTE — PROGRESS NOTES
Assessment/Plan: Patient overall stable  Blood pressure stable  TSH in range  Sed rate and C-reactive protein just minimally above normal   Continue to follow  Discussed the possibility of restless leg syndrome  Refer to neurology for chronic paresthesias and potential restless leg syndrome  While waiting to get into neurology recommend start Requip 0 25 daily and call back in 2 weeks for titration issues  Labs ordered for next visit to include vitamin D B12 repeat thyroid labs as well as others listed  Recheck 6 months    No problem-specific Assessment & Plan notes found for this encounter  Diagnoses and all orders for this visit:    Postoperative hypothyroidism  -     TSH, 3rd generation; Future    Encounter for screening mammogram for breast cancer  -     Mammo screening bilateral w 3d & cad; Future    Vitamin B 12 deficiency  -     Vitamin B12; Future    Vitamin D deficiency  -     Vitamin D 25 hydroxy; Future    Elevated fasting blood sugar  -     Lipid panel; Future  -     Comprehensive metabolic panel; Future  -     Hemoglobin A1C; Future    Paresthesia and pain of extremity  -     Ambulatory Referral to Neurology; Future    RLS (restless legs syndrome)  -     rOPINIRole (REQUIP) 0 25 mg tablet; Take 1 tablet (0 25 mg total) by mouth daily at bedtime    Breast cancer screening by mammogram  -     Mammo screening bilateral w 3d & cad; Future    Encounter for immunization        1  Postoperative hypothyroidism  TSH, 3rd generation      2  Encounter for screening mammogram for breast cancer  Mammo screening bilateral w 3d & cad      3  Vitamin B 12 deficiency  Vitamin B12      4  Vitamin D deficiency  Vitamin D 25 hydroxy      5  Elevated fasting blood sugar  Lipid panel    Comprehensive metabolic panel    Hemoglobin A1C      6  Paresthesia and pain of extremity  Ambulatory Referral to Neurology      7  RLS (restless legs syndrome)  rOPINIRole (REQUIP) 0 25 mg tablet      8   Breast cancer screening by mammogram  Mammo screening bilateral w 3d & cad      9  Encounter for immunization            Subjective:        Patient ID: Papito Babcock is a 49 y o  female  Chief Complaint   Patient presents with   • Hypothyroidism     Refill thyroid med 90 day        Patient in for recheck of labs  Overall stable denies issues at night  Shows me pictures of her cheeks were because she moves her legs a lot she is putting holes into the base of the sheets     Question of possible restless leg syndrome      The following portions of the patient's history were reviewed and updated as appropriate: past medical history, past surgical history and problem list       Review of Systems   Constitutional: Negative for fatigue  Continued temperature fluctuations subjectively  Change at nighttime          Eyes: Negative for visual disturbance  Respiratory: Negative for cough and shortness of breath  Cardiovascular: Negative for chest pain, palpitations and leg swelling  Gastrointestinal: Negative for abdominal pain  Musculoskeletal: Negative for arthralgias, back pain and joint swelling  Neurological: Negative for dizziness, weakness and headaches  Psychiatric/Behavioral: The patient is not nervous/anxious  Objective:  /82   Ht 5' 10" (1 778 m)   Wt 98 9 kg (218 lb)   BMI 31 28 kg/m²        Physical Exam  Vitals and nursing note reviewed  Constitutional:       General: She is not in acute distress  Appearance: Normal appearance  She is obese  She is not ill-appearing or diaphoretic  HENT:      Head: Normocephalic and atraumatic  Eyes:      General: No scleral icterus  Conjunctiva/sclera: Conjunctivae normal       Pupils: Pupils are equal, round, and reactive to light  Neck:      Thyroid: No thyromegaly  Cardiovascular:      Rate and Rhythm: Normal rate and regular rhythm  Pulses:           Carotid pulses are 0 on the right side and 0 on the left side       Heart sounds: Normal heart sounds  No murmur heard  Pulmonary:      Effort: Pulmonary effort is normal  No respiratory distress  Breath sounds: Normal breath sounds  No wheezing  Abdominal:      General: There is no distension  Palpations: Abdomen is soft  Musculoskeletal:      Cervical back: Normal range of motion and neck supple  Right lower leg: No edema  Left lower leg: No edema  Lymphadenopathy:      Cervical: No cervical adenopathy  Skin:     General: Skin is warm  Coloration: Skin is not pale  Neurological:      General: No focal deficit present  Mental Status: She is alert and oriented to person, place, and time  Mental status is at baseline  Cranial Nerves: No cranial nerve deficit  Deep Tendon Reflexes: Reflexes are normal and symmetric  Psychiatric:         Mood and Affect: Mood normal          Behavior: Behavior normal          Thought Content:  Thought content normal          Judgment: Judgment normal

## 2023-01-30 ENCOUNTER — ANESTHESIA EVENT (OUTPATIENT)
Dept: RADIOLOGY | Facility: HOSPITAL | Age: 48
End: 2023-01-30

## 2023-01-30 NOTE — PRE-PROCEDURE INSTRUCTIONS
Pre-Surgery Instructions:   Medication Instructions   • acetaminophen (TYLENOL) 500 mg tablet Uses PRN- OK to take day of surgery   • Cyanocobalamin (B-12) 1000 MCG/ML KIT Instructions provided by MD   • famotidine-calcium carbonate-magnesium hydroxide (Pepcid Complete) -165 MG CHEW Uses PRN- OK to take day of surgery   • FOLIC ACID PO Hold day of surgery  • levothyroxine 200 mcg tablet Take day of surgery  • levothyroxine 50 mcg tablet Take day of surgery     • pseudoephedrine (SUDAFED) 60 mg tablet Uses PRN- OK to take day of surgery   • rOPINIRole (REQUIP) 0 25 mg tablet Take night before surgery

## 2023-02-15 ENCOUNTER — ANESTHESIA (OUTPATIENT)
Dept: RADIOLOGY | Facility: HOSPITAL | Age: 48
End: 2023-02-15

## 2023-02-15 ENCOUNTER — HOSPITAL ENCOUNTER (OUTPATIENT)
Dept: RADIOLOGY | Facility: HOSPITAL | Age: 48
Discharge: HOME | End: 2023-02-15

## 2023-02-15 VITALS
DIASTOLIC BLOOD PRESSURE: 66 MMHG | RESPIRATION RATE: 16 BRPM | OXYGEN SATURATION: 97 % | WEIGHT: 218.03 LBS | BODY MASS INDEX: 31.21 KG/M2 | TEMPERATURE: 97.3 F | HEIGHT: 70 IN | SYSTOLIC BLOOD PRESSURE: 148 MMHG | HEART RATE: 62 BPM

## 2023-02-15 DIAGNOSIS — R20.8 BURNING SENSATION OF SKIN: ICD-10-CM

## 2023-02-15 DIAGNOSIS — R20.2 PARESTHESIA OF BILATERAL LEGS: ICD-10-CM

## 2023-02-15 LAB
EXT PREGNANCY TEST URINE: NEGATIVE
EXT. CONTROL: NORMAL

## 2023-02-15 RX ORDER — DIPHENHYDRAMINE HYDROCHLORIDE 50 MG/ML
12.5 INJECTION INTRAMUSCULAR; INTRAVENOUS ONCE AS NEEDED
Status: DISCONTINUED | OUTPATIENT
Start: 2023-02-15 | End: 2023-02-16 | Stop reason: HOSPADM

## 2023-02-15 RX ORDER — SODIUM CHLORIDE, SODIUM LACTATE, POTASSIUM CHLORIDE, CALCIUM CHLORIDE 600; 310; 30; 20 MG/100ML; MG/100ML; MG/100ML; MG/100ML
125 INJECTION, SOLUTION INTRAVENOUS CONTINUOUS
Status: DISCONTINUED | OUTPATIENT
Start: 2023-02-15 | End: 2023-02-16 | Stop reason: HOSPADM

## 2023-02-15 RX ORDER — PROPOFOL 10 MG/ML
INJECTION, EMULSION INTRAVENOUS AS NEEDED
Status: DISCONTINUED | OUTPATIENT
Start: 2023-02-15 | End: 2023-02-15

## 2023-02-15 RX ORDER — LIDOCAINE HYDROCHLORIDE 10 MG/ML
INJECTION, SOLUTION EPIDURAL; INFILTRATION; INTRACAUDAL; PERINEURAL AS NEEDED
Status: DISCONTINUED | OUTPATIENT
Start: 2023-02-15 | End: 2023-02-15

## 2023-02-15 RX ORDER — SODIUM CHLORIDE, SODIUM LACTATE, POTASSIUM CHLORIDE, CALCIUM CHLORIDE 600; 310; 30; 20 MG/100ML; MG/100ML; MG/100ML; MG/100ML
INJECTION, SOLUTION INTRAVENOUS CONTINUOUS PRN
Status: DISCONTINUED | OUTPATIENT
Start: 2023-02-15 | End: 2023-02-15

## 2023-02-15 RX ORDER — LIDOCAINE HYDROCHLORIDE 10 MG/ML
0.5 INJECTION, SOLUTION EPIDURAL; INFILTRATION; INTRACAUDAL; PERINEURAL ONCE AS NEEDED
Status: DISCONTINUED | OUTPATIENT
Start: 2023-02-15 | End: 2023-02-16 | Stop reason: HOSPADM

## 2023-02-15 RX ORDER — METOCLOPRAMIDE HYDROCHLORIDE 5 MG/ML
10 INJECTION INTRAMUSCULAR; INTRAVENOUS ONCE AS NEEDED
Status: DISCONTINUED | OUTPATIENT
Start: 2023-02-15 | End: 2023-02-16 | Stop reason: HOSPADM

## 2023-02-15 RX ORDER — ONDANSETRON 2 MG/ML
4 INJECTION INTRAMUSCULAR; INTRAVENOUS ONCE AS NEEDED
Status: DISCONTINUED | OUTPATIENT
Start: 2023-02-15 | End: 2023-02-16 | Stop reason: HOSPADM

## 2023-02-15 RX ORDER — ONDANSETRON 2 MG/ML
INJECTION INTRAMUSCULAR; INTRAVENOUS AS NEEDED
Status: DISCONTINUED | OUTPATIENT
Start: 2023-02-15 | End: 2023-02-15

## 2023-02-15 RX ADMIN — GADOBUTROL 10 ML: 604.72 INJECTION INTRAVENOUS at 12:48

## 2023-02-15 RX ADMIN — SODIUM CHLORIDE, SODIUM LACTATE, POTASSIUM CHLORIDE, AND CALCIUM CHLORIDE: .6; .31; .03; .02 INJECTION, SOLUTION INTRAVENOUS at 12:24

## 2023-02-15 RX ADMIN — ONDANSETRON 4 MG: 2 INJECTION INTRAMUSCULAR; INTRAVENOUS at 12:32

## 2023-02-15 RX ADMIN — PROPOFOL 200 MG: 10 INJECTION, EMULSION INTRAVENOUS at 12:32

## 2023-02-15 RX ADMIN — LIDOCAINE HYDROCHLORIDE 2 ML: 10 INJECTION, SOLUTION EPIDURAL; INFILTRATION; INTRACAUDAL; PERINEURAL at 12:32

## 2023-02-15 RX ADMIN — SODIUM CHLORIDE, SODIUM LACTATE, POTASSIUM CHLORIDE, AND CALCIUM CHLORIDE 125 ML/HR: .6; .31; .03; .02 INJECTION, SOLUTION INTRAVENOUS at 12:02

## 2023-02-15 NOTE — NURSING NOTE
MRI brain MS w/without contrast complete  Pt tolerated well  VSS post procedure  Pt alert and oriented x4 on room air  No complaints or visible signs of distress  Report given to Leidy wilson

## 2023-02-15 NOTE — ANESTHESIA POSTPROCEDURE EVALUATION
Post-Op Assessment Note    CV Status:  Stable  Pain Score: 0    Pain management: adequate     Mental Status:  Arousable   Hydration Status:  Euvolemic   PONV Controlled:  Controlled   Airway Patency:  Patent      Post Op Vitals Reviewed: Yes      Staff: Anesthesiologist, CRNA         No notable events documented      BP   126/58   Temp   97 6   Pulse  70   Resp   18   SpO2   94 room air

## 2023-02-15 NOTE — ANESTHESIA PREPROCEDURE EVALUATION
Procedure:  MRI BRAIN MS WO AND W CONTRAST    Relevant Problems   ANESTHESIA (within normal limits)      CARDIO (within normal limits)      ENDO   (+) Postoperative hypothyroidism      GI/HEPATIC   (+) Gastroesophageal reflux disease without esophagitis      /RENAL (within normal limits)      GYN (within normal limits)      HEMATOLOGY (within normal limits)      MUSCULOSKELETAL   (+) Sciatica      NEURO/PSYCH   (+) Paresthesia of bilateral legs   (+) Paresthesia of foot, bilateral      PULMONARY (within normal limits)      Nervous and Auditory   (+) Cervical radiculopathy   (+) Idiopathic small fiber sensory neuropathy   (+) Lumbar radiculopathy      Other   (+) Obesity   (+) Tobacco use        Physical Exam    Airway    Mallampati score: II  TM Distance: >3 FB  Neck ROM: full     Dental   No notable dental hx     Cardiovascular  Rhythm: regular, Rate: normal, Cardiovascular exam normal    Pulmonary  Pulmonary exam normal Breath sounds clear to auscultation,     Other Findings        Anesthesia Plan  ASA Score- 3     Anesthesia Type- general with ASA Monitors  Additional Monitors:   Airway Plan: LMA  Plan Factors-Exercise tolerance (METS): >4 METS  Chart reviewed  EKG reviewed  Imaging results reviewed  Existing labs reviewed  Patient summary reviewed  Induction- intravenous  Postoperative Plan-     Informed Consent- Anesthetic plan and risks discussed with patient  I personally reviewed this patient with the CRNA  Discussed and agreed on the Anesthesia Plan with the CRNA  Micheal Crawford           Recent labs personally reviewed:  Lab Results   Component Value Date    WBC 9 29 06/03/2022    HGB 9 2 (L) 06/03/2022     06/03/2022     Lab Results   Component Value Date     02/24/2015    K 3 9 11/13/2021    BUN 9 11/13/2021    CREATININE 0 80 11/13/2021    GLUCOSE 104 02/24/2015     Lab Results   Component Value Date    PTT 28 02/24/2015      Lab Results   Component Value Date    INR 0 95 02/24/2015     Lab Results   Component Value Date    HGBA1C 5 9 (H) 07/15/2022       I, Aayush Harvey MD, have personally seen and evaluated the patient prior to anesthetic care  I have reviewed the pre-anesthetic record, and other medical records if appropriate to the anesthetic care  If a CRNA is involved in the case, I have reviewed the CRNA assessment, if present, and agree  Risks/benefits and alternatives discussed with patient including possible PONV, sore throat, and possibility of rare anesthetic and surgical emergencies

## 2023-02-17 DIAGNOSIS — G25.81 RLS (RESTLESS LEGS SYNDROME): ICD-10-CM

## 2023-02-19 RX ORDER — ROPINIROLE 0.25 MG/1
0.25 TABLET, FILM COATED ORAL
Qty: 90 TABLET | Refills: 1 | Status: SHIPPED | OUTPATIENT
Start: 2023-02-19

## 2023-02-20 ENCOUNTER — CLINICAL SUPPORT (OUTPATIENT)
Dept: FAMILY MEDICINE CLINIC | Facility: CLINIC | Age: 48
End: 2023-02-20

## 2023-02-20 ENCOUNTER — TELEPHONE (OUTPATIENT)
Dept: FAMILY MEDICINE CLINIC | Facility: CLINIC | Age: 48
End: 2023-02-20

## 2023-02-20 DIAGNOSIS — Z23 ENCOUNTER FOR IMMUNIZATION: ICD-10-CM

## 2023-02-20 RX ADMIN — CYANOCOBALAMIN 1000 MCG: 1000 INJECTION, SOLUTION INTRAMUSCULAR; SUBCUTANEOUS at 16:11

## 2023-02-20 NOTE — PROGRESS NOTES
B12 injection  1,000 micrograms IM Chase, pts son, updated via phone. Requested to be present for discharge instructions when that time comes as he is caregiver and his father lives with him. Will pass this on in report for discharge planning.

## 2023-03-20 ENCOUNTER — CLINICAL SUPPORT (OUTPATIENT)
Dept: FAMILY MEDICINE CLINIC | Facility: CLINIC | Age: 48
End: 2023-03-20

## 2023-03-20 DIAGNOSIS — E53.8 B12 DEFICIENCY: Primary | ICD-10-CM

## 2023-03-20 RX ADMIN — CYANOCOBALAMIN 1000 MCG: 1000 INJECTION, SOLUTION INTRAMUSCULAR; SUBCUTANEOUS at 16:17

## 2023-05-01 DIAGNOSIS — E89.0 POSTOPERATIVE HYPOTHYROIDISM: ICD-10-CM

## 2023-05-02 RX ORDER — LEVOTHYROXINE SODIUM 0.05 MG/1
50 TABLET ORAL DAILY
Qty: 90 TABLET | Refills: 0 | Status: SHIPPED | OUTPATIENT
Start: 2023-05-02

## 2023-05-02 RX ORDER — LEVOTHYROXINE SODIUM 0.2 MG/1
200 TABLET ORAL DAILY
Qty: 90 TABLET | Refills: 0 | Status: SHIPPED | OUTPATIENT
Start: 2023-05-02

## 2023-05-22 ENCOUNTER — CLINICAL SUPPORT (OUTPATIENT)
Dept: FAMILY MEDICINE CLINIC | Facility: CLINIC | Age: 48
End: 2023-05-22

## 2023-05-22 DIAGNOSIS — E53.1 VITAMIN B6 DEFICIENCY: Primary | ICD-10-CM

## 2023-05-22 RX ADMIN — CYANOCOBALAMIN 1000 MCG: 1000 INJECTION, SOLUTION INTRAMUSCULAR; SUBCUTANEOUS at 16:34

## 2023-06-13 ENCOUNTER — HOSPITAL ENCOUNTER (OUTPATIENT)
Dept: MAMMOGRAPHY | Facility: MEDICAL CENTER | Age: 48
Discharge: HOME | End: 2023-06-13
Payer: COMMERCIAL

## 2023-06-13 VITALS — HEIGHT: 70 IN | BODY MASS INDEX: 31.21 KG/M2 | WEIGHT: 218.03 LBS

## 2023-06-13 DIAGNOSIS — Z12.31 BREAST CANCER SCREENING BY MAMMOGRAM: ICD-10-CM

## 2023-06-13 PROCEDURE — 77063 BREAST TOMOSYNTHESIS BI: CPT

## 2023-06-13 PROCEDURE — 77067 SCR MAMMO BI INCL CAD: CPT

## 2023-06-26 ENCOUNTER — CLINICAL SUPPORT (OUTPATIENT)
Dept: FAMILY MEDICINE CLINIC | Facility: CLINIC | Age: 48
End: 2023-06-26
Payer: COMMERCIAL

## 2023-06-26 DIAGNOSIS — E53.8 VITAMIN B 12 DEFICIENCY: Primary | ICD-10-CM

## 2023-06-26 PROCEDURE — 96372 THER/PROPH/DIAG INJ SC/IM: CPT

## 2023-06-26 RX ADMIN — CYANOCOBALAMIN 1000 MCG: 1000 INJECTION, SOLUTION INTRAMUSCULAR; SUBCUTANEOUS at 16:10

## 2023-07-27 ENCOUNTER — CLINICAL SUPPORT (OUTPATIENT)
Dept: FAMILY MEDICINE CLINIC | Facility: CLINIC | Age: 48
End: 2023-07-27
Payer: COMMERCIAL

## 2023-07-27 DIAGNOSIS — E53.8 VITAMIN B 12 DEFICIENCY: Primary | ICD-10-CM

## 2023-07-27 PROCEDURE — 96372 THER/PROPH/DIAG INJ SC/IM: CPT

## 2023-07-27 RX ADMIN — CYANOCOBALAMIN 1000 MCG: 1000 INJECTION, SOLUTION INTRAMUSCULAR; SUBCUTANEOUS at 16:15

## 2023-07-29 ENCOUNTER — APPOINTMENT (OUTPATIENT)
Dept: LAB | Facility: HOSPITAL | Age: 48
End: 2023-07-29
Payer: COMMERCIAL

## 2023-07-29 DIAGNOSIS — E55.9 VITAMIN D DEFICIENCY: ICD-10-CM

## 2023-07-29 DIAGNOSIS — R73.01 ELEVATED FASTING BLOOD SUGAR: ICD-10-CM

## 2023-07-29 DIAGNOSIS — E53.8 VITAMIN B 12 DEFICIENCY: ICD-10-CM

## 2023-07-29 DIAGNOSIS — E89.0 POSTOPERATIVE HYPOTHYROIDISM: ICD-10-CM

## 2023-07-29 LAB
25(OH)D3 SERPL-MCNC: 14.4 NG/ML (ref 30–100)
ALBUMIN SERPL BCP-MCNC: 3.9 G/DL (ref 3.5–5)
ALP SERPL-CCNC: 99 U/L (ref 34–104)
ALT SERPL W P-5'-P-CCNC: 11 U/L (ref 7–52)
ANION GAP SERPL CALCULATED.3IONS-SCNC: 5 MMOL/L
AST SERPL W P-5'-P-CCNC: 13 U/L (ref 13–39)
BILIRUB SERPL-MCNC: 0.49 MG/DL (ref 0.2–1)
BUN SERPL-MCNC: 7 MG/DL (ref 5–25)
CALCIUM SERPL-MCNC: 9.1 MG/DL (ref 8.4–10.2)
CHLORIDE SERPL-SCNC: 107 MMOL/L (ref 96–108)
CHOLEST SERPL-MCNC: 134 MG/DL
CO2 SERPL-SCNC: 25 MMOL/L (ref 21–32)
CREAT SERPL-MCNC: 0.89 MG/DL (ref 0.6–1.3)
EST. AVERAGE GLUCOSE BLD GHB EST-MCNC: 123 MG/DL
GFR SERPL CREATININE-BSD FRML MDRD: 76 ML/MIN/1.73SQ M
GLUCOSE P FAST SERPL-MCNC: 98 MG/DL (ref 65–99)
HBA1C MFR BLD: 5.9 %
HDLC SERPL-MCNC: 23 MG/DL
LDLC SERPL CALC-MCNC: 74 MG/DL (ref 0–100)
NONHDLC SERPL-MCNC: 111 MG/DL
POTASSIUM SERPL-SCNC: 4.5 MMOL/L (ref 3.5–5.3)
PROT SERPL-MCNC: 7 G/DL (ref 6.4–8.4)
SODIUM SERPL-SCNC: 137 MMOL/L (ref 135–147)
TRIGL SERPL-MCNC: 187 MG/DL
TSH SERPL DL<=0.05 MIU/L-ACNC: 1.04 UIU/ML (ref 0.45–4.5)
VIT B12 SERPL-MCNC: 1346 PG/ML (ref 180–914)

## 2023-07-29 PROCEDURE — 84443 ASSAY THYROID STIM HORMONE: CPT

## 2023-07-29 PROCEDURE — 82607 VITAMIN B-12: CPT

## 2023-07-29 PROCEDURE — 80061 LIPID PANEL: CPT

## 2023-07-29 PROCEDURE — 80053 COMPREHEN METABOLIC PANEL: CPT

## 2023-07-29 PROCEDURE — 82306 VITAMIN D 25 HYDROXY: CPT

## 2023-07-29 PROCEDURE — 83036 HEMOGLOBIN GLYCOSYLATED A1C: CPT

## 2023-07-29 PROCEDURE — 36415 COLL VENOUS BLD VENIPUNCTURE: CPT

## 2023-08-02 ENCOUNTER — OFFICE VISIT (OUTPATIENT)
Dept: FAMILY MEDICINE CLINIC | Facility: CLINIC | Age: 48
End: 2023-08-02
Payer: COMMERCIAL

## 2023-08-02 VITALS
DIASTOLIC BLOOD PRESSURE: 80 MMHG | HEART RATE: 72 BPM | SYSTOLIC BLOOD PRESSURE: 140 MMHG | OXYGEN SATURATION: 97 % | HEIGHT: 69 IN | BODY MASS INDEX: 32.64 KG/M2 | WEIGHT: 220.4 LBS | RESPIRATION RATE: 16 BRPM

## 2023-08-02 DIAGNOSIS — E53.8 VITAMIN B 12 DEFICIENCY: ICD-10-CM

## 2023-08-02 DIAGNOSIS — L30.9 CHRONIC DERMATITIS OF FEET: ICD-10-CM

## 2023-08-02 DIAGNOSIS — R73.01 ELEVATED FASTING BLOOD SUGAR: ICD-10-CM

## 2023-08-02 DIAGNOSIS — E55.9 VITAMIN D DEFICIENCY: ICD-10-CM

## 2023-08-02 DIAGNOSIS — E89.0 POSTOPERATIVE HYPOTHYROIDISM: Primary | ICD-10-CM

## 2023-08-02 PROCEDURE — 99213 OFFICE O/P EST LOW 20 MIN: CPT | Performed by: FAMILY MEDICINE

## 2023-08-05 DIAGNOSIS — E89.0 POSTOPERATIVE HYPOTHYROIDISM: ICD-10-CM

## 2023-08-06 NOTE — PROGRESS NOTES
Assessment/Plan: Blood pressure not ideal.  Aware goal would be 130/80 and below. Continue risk factor modification. Continue B12 treatments. Refer to podiatry due to chronic dryness-irritation of the feet. Recheck 6 months with repeat labs to include vitamin D B12 TSH and A1c. Recommend yearly mammography and routine GYN examination. Aware I am retiring. No problem-specific Assessment & Plan notes found for this encounter. Diagnoses and all orders for this visit:    Postoperative hypothyroidism  -     TSH, 3rd generation; Future    Vitamin B 12 deficiency  -     Vitamin B12; Future    Vitamin D deficiency  -     Vitamin D 25 hydroxy; Future    Elevated fasting blood sugar  -     Hemoglobin A1C; Future    Chronic dermatitis of feet  -     Ambulatory Referral to Podiatry; Future        1. Postoperative hypothyroidism  TSH, 3rd generation      2. Vitamin B 12 deficiency  Vitamin B12      3. Vitamin D deficiency  Vitamin D 25 hydroxy      4. Elevated fasting blood sugar  Hemoglobin A1C      5. Chronic dermatitis of feet  Ambulatory Referral to Podiatry          Subjective:        Patient ID: Amber Jay is a 49 y.o. female. Chief Complaint   Patient presents with   • Follow-up     6 mo follow up        Lab recheck. Complains of dry skin on feet      The following portions of the patient's history were reviewed and updated as appropriate: past medical history, past surgical history and problem list.      Review of Systems   Constitutional: Negative for fatigue. Eyes: Negative for visual disturbance. Respiratory: Negative for cough and shortness of breath. Cardiovascular: Negative for chest pain, palpitations and leg swelling. Gastrointestinal: Negative for abdominal pain. Neurological: Negative for dizziness and headaches. Psychiatric/Behavioral: The patient is not nervous/anxious.           Objective:  /80 (BP Location: Left arm, Patient Position: Sitting, Cuff Size: Large) Pulse 72   Resp 16   Ht 5' 8.9" (1.75 m)   Wt 100 kg (220 lb 6.4 oz)   LMP  (LMP Unknown)   SpO2 97%   BMI 32.64 kg/m²        Physical Exam  Vitals and nursing note reviewed. Constitutional:       General: She is not in acute distress. Appearance: Normal appearance. She is obese. HENT:      Head: Normocephalic. Eyes:      General: No scleral icterus. Pupils: Pupils are equal, round, and reactive to light. Cardiovascular:      Rate and Rhythm: Normal rate and regular rhythm. Heart sounds: Normal heart sounds. No murmur heard. Pulmonary:      Breath sounds: Normal breath sounds. Musculoskeletal:      Right lower leg: No edema. Left lower leg: No edema. Lymphadenopathy:      Cervical: No cervical adenopathy. Skin:     General: Skin is dry. Coloration: Skin is not pale. Neurological:      General: No focal deficit present. Mental Status: She is alert and oriented to person, place, and time. Psychiatric:         Behavior: Behavior normal.         Thought Content:  Thought content normal.

## 2023-08-07 RX ORDER — LEVOTHYROXINE SODIUM 0.2 MG/1
200 TABLET ORAL DAILY
Qty: 90 TABLET | Refills: 0 | Status: SHIPPED | OUTPATIENT
Start: 2023-08-07

## 2023-08-07 RX ORDER — LEVOTHYROXINE SODIUM 0.05 MG/1
50 TABLET ORAL DAILY
Qty: 90 TABLET | Refills: 0 | Status: SHIPPED | OUTPATIENT
Start: 2023-08-07

## 2023-08-08 ENCOUNTER — CONSULT (OUTPATIENT)
Dept: NEUROLOGY | Facility: CLINIC | Age: 48
End: 2023-08-08
Payer: COMMERCIAL

## 2023-08-08 ENCOUNTER — TELEPHONE (OUTPATIENT)
Dept: NEUROLOGY | Facility: CLINIC | Age: 48
End: 2023-08-08

## 2023-08-08 VITALS
HEIGHT: 68 IN | HEART RATE: 66 BPM | WEIGHT: 218 LBS | SYSTOLIC BLOOD PRESSURE: 130 MMHG | BODY MASS INDEX: 33.04 KG/M2 | DIASTOLIC BLOOD PRESSURE: 84 MMHG

## 2023-08-08 DIAGNOSIS — G25.81 RESTLESS LEG SYNDROME: ICD-10-CM

## 2023-08-08 DIAGNOSIS — G62.9 POLYNEUROPATHY: Primary | ICD-10-CM

## 2023-08-08 PROBLEM — M54.12 CERVICAL RADICULOPATHY: Status: RESOLVED | Noted: 2017-04-09 | Resolved: 2023-08-08

## 2023-08-08 PROBLEM — M54.16 LUMBAR RADICULOPATHY: Status: RESOLVED | Noted: 2017-04-09 | Resolved: 2023-08-08

## 2023-08-08 PROCEDURE — 99205 OFFICE O/P NEW HI 60 MIN: CPT | Performed by: PSYCHIATRY & NEUROLOGY

## 2023-08-08 RX ORDER — GABAPENTIN 400 MG/1
400 CAPSULE ORAL
Qty: 30 CAPSULE | Refills: 2 | Status: SHIPPED | OUTPATIENT
Start: 2023-08-08 | End: 2023-11-06

## 2023-08-08 NOTE — PROGRESS NOTES
Patient ID: Meghna León is a 49 y.o. female. Assessment/Plan:    Patient is a 26-year-old female presenting to the clinic today for an evaluation of numbness. Patient is describing her symptoms, it seems as if patient's symptoms are polyneuropathy versus restless leg syndrome. On neurological examination, there was a slight decrease in pinprick sensation from distal calf to ankle. At this time, further lab work and imaging are warranted to differentiate. Previously, patient had tried gabapentin but she was only on 300 mg daily. Patient is agreeable to trying gabapentin one more time and we will start her on 400 mg at bedtime. Patient did have a MRI done with MS protocol because of his previously recommended but there were no concerns of demyelinating disease. Diagnoses and all orders for this visit:    Polyneuropathy  -     Ambulatory Referral to Neurology  -     EMG 2 limb lower extremity; Future  -     gabapentin (NEURONTIN) 400 mg capsule; Take 1 capsule (400 mg total) by mouth daily at bedtime  -     Ferritin, Serum (Serial); Future    Restless leg syndrome  -     Ferritin, Serum (Serial); Future      MRI Brain MS wo and w contrast 2/15/23: There is a solitary white matter hyperintensity identified immediately adjacent to the frontal horn of the right lateral ventricle on series 7 image 17. There are no other white matter lesions identified in the brain parenchyma. This is nonspecific in   appearance and may represent encroaches chronic microangiopathic change. Pattern is not diagnostic of demyelinating disease. Plan:  · Start gabapentin 400 mg at bedtime  · EMG ordered - 2 lower limbs bilaterally  · Monitor for any worsening signs or symptoms  · Call for any questions or concerns      The patient indicates understanding of these issues and agrees with the plan. Return in about 4 months (around 12/8/2023) for After EMG. This patient case was discussed with Dr. Mariaelena Blanchard. Subjective:    HPI    Patient is a 39-year-old female presenting to the clinic today in evaluation of numbness. She has a past medical history of GERD, sciatica, paresthesias of foot bilaterally, paresthesias of bilateral legs, and B12 deficiency. She describes symptoms of numbness and burning affecting her legs which is symmetric, of mild-moderate severity. She denies tingling, cramping, squeezing and hypersensitivity. Onset of symptoms was 5-6 years and has been getting better. . Symptoms currently occur at bedtime and during sleep and last all night and then no numbness/burning in the morning. She denies orthostasis, bloating, nausea, early satiety, diarrhea, constipation, alternating diarrhea and constipation, dry eyes, dry mouth, dry eyes and dry mouth, blurred vision, lack of sweating and sexual dysfunction. Patient states that there are times when she has to just get in the shower in the middle of the night and that helps her. Initially, the patient had numbness from head to toe but now its mostly in her legs bilaterally. Previous treatment included Gabapentin 100 mg which was increased to 300 mg. She denies any side effects in regards to taking Gabapentin. Of note, patient saw Dr. Andie Smalls on 5/7/2020 who possibly suspected MS. Patient did have an MRI brain MS protocol recently and it was negative for any demyelinating disease. There are no other complaints at this time. The following portions of the patient's history were reviewed and updated as appropriate:   She  has a past medical history of Abnormal Pap smear of cervix, Abnormal uterine bleeding (AUB), Disease of thyroid gland, Mild acid reflux, Paresthesia of foot, bilateral, Sinus headache, and Tobacco abuse.      Patient Active Problem List    Diagnosis Date Noted   • Pharyngitis 11/18/2022   • S/P D&C (status post dilation and curettage) 06/03/2022   • S/P endometrial ablation 06/03/2022   • Right ovarian cyst 03/01/2022 • Abnormal uterine bleeding (AUB) 02/14/2022   • Paresthesia of foot, bilateral 05/07/2020   • Paresthesia of bilateral legs 05/07/2020   • Idiopathic small fiber sensory neuropathy 05/07/2020   • B12 deficiency 12/09/2019   • Gastroesophageal reflux disease without esophagitis 12/09/2019   • Tobacco use 12/09/2019   • Leukemoid reaction 08/03/2018   • Obesity 01/29/2016   • Postoperative hypothyroidism 10/27/2014   • Sciatica 10/25/2013   • Chronic lymphocytic thyroiditis 04/16/2013     She  has a past surgical history that includes Total thyroidectomy (N/A, 06/21/2014); Tonsillectomy and adenoidectomy; US guided breast biopsy left complete (Left); Breast biopsy (Left, 2011); Colonoscopy; pr hysteroscopy bx endometrium&/polypc w/wo d&c (N/A, 06/03/2022); and pr hysteroscopy endometrial ablation (N/A, 06/03/2022). Her family history includes Breast cancer (age of onset: 76) in her maternal aunt; Dementia in her paternal grandmother; Diabetes in her brother and son; Diabetes type II in her mother; Heart attack in her maternal grandfather; Heart disease in her maternal grandfather; Hypertension in her father; No Known Problems in her maternal aunt, maternal aunt, maternal grandmother, and paternal grandfather. She  reports that she has been smoking cigarettes. She has a 42.00 pack-year smoking history. She has never used smokeless tobacco. She reports current alcohol use of about 3.0 standard drinks of alcohol per week. She reports that she does not use drugs.      Current Outpatient Medications   Medication Sig Dispense Refill   • acetaminophen (TYLENOL) 500 mg tablet Take 2 tablets (1,000 mg total) by mouth every 6 (six) hours as needed for mild pain 30 tablet 0   • Cyanocobalamin (B-12) 1000 MCG/ML KIT Inject as directed every 30 (thirty) days     • famotidine-calcium carbonate-magnesium hydroxide (Pepcid Complete) -165 MG CHEW Chew 1 tablet daily as needed for heartburn     • FOLIC ACID PO Take by mouth daily As needed      • gabapentin (NEURONTIN) 400 mg capsule Take 1 capsule (400 mg total) by mouth daily at bedtime 30 capsule 2   • levothyroxine 200 mcg tablet TAKE 1 TABLET BY MOUTH EVERY DAY 90 tablet 0   • levothyroxine 50 mcg tablet TAKE 1 TABLET BY MOUTH EVERY DAY 90 tablet 0   • pseudoephedrine (SUDAFED) 60 mg tablet Take 60 mg by mouth every 4 (four) hours as needed for congestion     • rOPINIRole (REQUIP) 0.25 mg tablet TAKE 1 TABLET (0.25 MG TOTAL) BY MOUTH DAILY AT BEDTIME 90 tablet 1     Current Facility-Administered Medications   Medication Dose Route Frequency Provider Last Rate Last Admin   • cyanocobalamin injection 1,000 mcg  1,000 mcg Intramuscular F23 Days Christa Plants, DO   6,302 mcg at 07/27/23 1615     Current Outpatient Medications on File Prior to Visit   Medication Sig   • acetaminophen (TYLENOL) 500 mg tablet Take 2 tablets (1,000 mg total) by mouth every 6 (six) hours as needed for mild pain   • Cyanocobalamin (B-12) 1000 MCG/ML KIT Inject as directed every 30 (thirty) days   • famotidine-calcium carbonate-magnesium hydroxide (Pepcid Complete) -165 MG CHEW Chew 1 tablet daily as needed for heartburn   • FOLIC ACID PO Take by mouth daily As needed    • levothyroxine 200 mcg tablet TAKE 1 TABLET BY MOUTH EVERY DAY   • levothyroxine 50 mcg tablet TAKE 1 TABLET BY MOUTH EVERY DAY   • pseudoephedrine (SUDAFED) 60 mg tablet Take 60 mg by mouth every 4 (four) hours as needed for congestion   • rOPINIRole (REQUIP) 0.25 mg tablet TAKE 1 TABLET (0.25 MG TOTAL) BY MOUTH DAILY AT BEDTIME     Current Facility-Administered Medications on File Prior to Visit   Medication   • cyanocobalamin injection 1,000 mcg     She is allergic to lamisil [terbinafine] and prednisone. .         Objective:    Blood pressure 130/84, pulse 66, height 5' 8" (1.727 m), weight 98.9 kg (218 lb), not currently breastfeeding. Physical Exam  Vitals reviewed. HENT:      Head: Normocephalic and atraumatic. Mouth/Throat:      Mouth: Mucous membranes are moist.   Eyes:      General: Lids are normal.      Extraocular Movements: Extraocular movements intact. Conjunctiva/sclera: Conjunctivae normal.      Pupils: Pupils are equal, round, and reactive to light. Cardiovascular:      Rate and Rhythm: Normal rate. Pulmonary:      Effort: Pulmonary effort is normal.   Musculoskeletal:         General: Normal range of motion. Skin:     General: Skin is warm. Neurological:      Mental Status: She is alert. Motor: Motor strength is normal.     Coordination: Romberg sign negative. Deep Tendon Reflexes:      Reflex Scores:       Tricep reflexes are 2+ on the right side and 2+ on the left side. Bicep reflexes are 2+ on the right side and 2+ on the left side. Brachioradialis reflexes are 2+ on the right side and 2+ on the left side. Patellar reflexes are 2+ on the right side and 2+ on the left side. Psychiatric:         Mood and Affect: Mood normal.         Speech: Speech normal.         Behavior: Behavior normal.         Thought Content: Thought content normal.         Judgment: Judgment normal.         Neurological Exam  Mental Status  Alert. Oriented to person, place and time. Speech is normal.    Cranial Nerves  CN II: Visual acuity is normal. Visual fields full to confrontation. CN III, IV, VI: Extraocular movements intact bilaterally. Normal lids and orbits bilaterally. Pupils equal round and reactive to light bilaterally. CN V: Facial sensation is normal.  CN VII: Full and symmetric facial movement. CN VIII: Hearing is normal.  CN IX, X: Palate elevates symmetrically. Normal gag reflex. CN XI: Shoulder shrug strength is normal.  CN XII: Tongue midline without atrophy or fasciculations. Motor  Normal muscle bulk throughout. No fasciculations present. Normal muscle tone. No abnormal involuntary movements. Strength is 5/5 throughout all four extremities.     Sensory  Light touch is normal in upper and lower extremities. Decreased pinprick sensation from distal calf to ankle bilaterally. Vibration is normal in upper and lower extremities. Reflexes                                            Right                      Left  Brachioradialis                    2+                         2+  Biceps                                 2+                         2+  Triceps                                2+                         2+  Patellar                                2+                         2+    Coordination  Right: Finger-to-nose normal. Rapid alternating movement normal.Left: Finger-to-nose normal. Rapid alternating movement normal.    Gait  Casual gait is normal including stance, stride, and arm swing. Normal toe walking. Normal heel walking. Romberg is absent. ROS:    Review of Systems   Neurological: Positive for light-headedness (At times when she is bending down. ). Negative for dizziness, tremors, seizures, syncope, facial asymmetry, speech difficulty, weakness, numbness and headaches. 09-Nov-2022 23:45

## 2023-08-08 NOTE — TELEPHONE ENCOUNTER
Left v/m for pt to call office to sched f/u with Dr. Griselda Vallejo since since she was able to get in for EMG in Sept.

## 2023-08-29 ENCOUNTER — CLINICAL SUPPORT (OUTPATIENT)
Dept: FAMILY MEDICINE CLINIC | Facility: CLINIC | Age: 48
End: 2023-08-29
Payer: COMMERCIAL

## 2023-08-29 DIAGNOSIS — E53.8 B12 DEFICIENCY: ICD-10-CM

## 2023-08-29 PROCEDURE — 96372 THER/PROPH/DIAG INJ SC/IM: CPT

## 2023-08-29 RX ADMIN — CYANOCOBALAMIN 1000 MCG: 1000 INJECTION, SOLUTION INTRAMUSCULAR; SUBCUTANEOUS at 17:13

## 2023-09-13 ENCOUNTER — HOSPITAL ENCOUNTER (OUTPATIENT)
Dept: NEUROLOGY | Facility: CLINIC | Age: 48
Discharge: HOME | End: 2023-09-13
Payer: COMMERCIAL

## 2023-09-13 DIAGNOSIS — G62.9 POLYNEUROPATHY: ICD-10-CM

## 2023-09-13 PROCEDURE — 95886 MUSC TEST DONE W/N TEST COMP: CPT | Performed by: PSYCHIATRY & NEUROLOGY

## 2023-09-13 PROCEDURE — 95910 NRV CNDJ TEST 7-8 STUDIES: CPT | Performed by: PSYCHIATRY & NEUROLOGY

## 2023-09-14 ENCOUNTER — TELEPHONE (OUTPATIENT)
Dept: NEUROLOGY | Facility: CLINIC | Age: 48
End: 2023-09-14

## 2023-09-14 NOTE — TELEPHONE ENCOUNTER
I called the patient and left a voicemail that I was calling in regards to her EMG results. Genaro Laurent back line given.

## 2023-09-14 NOTE — TELEPHONE ENCOUNTER
----- Message from Eddie Alicia DO sent at 9/14/2023  4:57 PM EDT -----  Hello,    Please let this patient know that her EMG was normal. Thank you!     Sincerely,  Cong Adorno    ----- Message -----  From: Regina Haywood MD  Sent: 9/13/2023   2:38 PM EDT  To: Eddie Alicia DO

## 2023-09-27 ENCOUNTER — VBI (OUTPATIENT)
Dept: ADMINISTRATIVE | Facility: OTHER | Age: 48
End: 2023-09-27

## 2023-10-03 ENCOUNTER — CLINICAL SUPPORT (OUTPATIENT)
Dept: FAMILY MEDICINE CLINIC | Facility: CLINIC | Age: 48
End: 2023-10-03
Payer: COMMERCIAL

## 2023-10-03 DIAGNOSIS — E53.8 B12 DEFICIENCY: ICD-10-CM

## 2023-10-03 PROCEDURE — 96372 THER/PROPH/DIAG INJ SC/IM: CPT

## 2023-10-03 RX ADMIN — CYANOCOBALAMIN 1000 MCG: 1000 INJECTION, SOLUTION INTRAMUSCULAR; SUBCUTANEOUS at 17:26

## 2023-10-18 NOTE — TELEPHONE ENCOUNTER
Student's Name:   Crystal Mireles Birth Date  2019  Sex  female  Race/Ethnicity   School/Grade Level/ID#     Address:   [address] [place] [address]  Parent/Guardian             Telephone#                                            Home:                               Work:   IMMUNIZATIONS: To be completed by health care provider. The mo/da/yr for every dose administered is required. If a specific vaccine is medically contraindicated, a separate written statement must be attached by the health care responsible for completing the health examination explaining the medical reason for the contraindication.      Immunization History   Administered Date(s) Administered   • DTaP/Hep B/IPV 2018, 02/15/2019, 04/19/2019   • DTaP/IPV 10/18/2022   • Dtap, Unspecified Formulation 04/17/2020   • HIB, Unspecified Formulation 2018, 02/15/2019, 04/19/2019, 01/17/2020   • Hep A, ped/adol, 2 dose 01/17/2020, 11/06/2020   • Hep B, Unspecified Formulation 2018   • Hep B, adolescent or pediatric 2018   • Influenza, Unspecified Formulation 10/18/2019, 12/06/2019   • Influenza, quadrivalent, preserve-free 11/06/2020, 10/22/2021   • MMR 10/18/2019, 10/18/2023   • Pneumococcal Conjugate 13 Valent Vacc (Prevnar 13) 2018, 02/15/2019, 04/19/2019, 10/18/2019   • Rotavirus, Unspecified Formulation 2018, 02/15/2019   • Varicella 10/18/2019, 10/18/2023      Immunizations given 10/18/2023: None      Health care provider (MD, DO, APN, PA, school health professional, health official) verifying above immunization history must sign below. If adding dates to the above immunization history section, put your initials by date(s) and sign here.)  Signature                                                                 Title                                                Date  _____________________________________________________________________________________  Signature                                     Mansoor called from radiology [address] [facility] dept needing clarification on pt's US  They need to know if Rizwana Joyce wants a transvaginal  trans abdominal pelvis instead of abdominal completed and pelvis  Per Rizwana Joyce she wants an US of  abdomen and transvaginal        Seven they are asking do you want ultra sound of pt's upper abdominal organs if so what is the indication only see pelvic pain in chart?  Please advise                              Title                                                Date  _____________________________________________________________________________________   ALTERNATIVE PROOF OF IMMUNITY   1. Clinical diagnosis (measles, mumps, hepatitis B) is allowed when verified by physician and supported with lab confirmation.  Attach copy of lab result.    * MEASLES (Rubeola)  MO   DA  YR          **MUMPS  MO   DA  YR             HEPATITIS B  MO   DA   YR           VARICELLA  MO   DA  YR      2. History of varicella (chickenpox) disease is acceptable if verified by health care provider, school health professional or health official.  Person signing below verifies that the parent/guardian’s description of varicella disease history is indicative of past infection and is accepting such history as documentation of disease.  Date of Disease                                  Signature                                                           Title   3. Laboratory Evidence of Immunity (check one)      __ Measles*         __ Mumps**        __ Rubella        __Varicella    (Attach copy of lab result)         *All measles cases diagnosed on or after July 1, 2002, must be confirmed by laboratory evidence.      **All mumps cases diagnosed on or after July 1, 2013, must be confirmed by laboratory evidence.     Completion of Alternative 1 or 3 MUST be accompanied by Labs & Physician Signature: ___________________________  Physician Statements of Immunity MUST be submitted to [facility] for review.     Certificates of [employer] Exemption to Immunizations or Physician Medical Statements of Medical Contraindication Are Reviewed   and Maintained by the School Authority     (11/2015)                                         (COMPLETE BOTH SIDES)                                  Approved [facility] 3/2016      HEALTH HISTORY      TO BE COMPLETED AND SIGNED BY PARENT/GUARDIAN AND VERIFIED BY HEALTH CARE PROVIDER  ALLERGIES: (Food, drug,  insect, other) has No Known Allergies.   MEDICATION: (List all prescribed or taken on a regular basis.) has a current medication list which includes the following prescription(s): Pediatric Multivitamins-Iron (CHILDRENS MULTI VITAMINS/IRON PO).   Diagnosis of asthma?  Child wakes during night coughing? Yes    No  Yes    No  Loss of function of one of paired  organs?(eye/ear/kidney/testicle) Yes    No    Birth defects? Yes    No  Hospitalizations? Yes    No    Developmental delay? Yes    No   When? What for? Yes    No    Blood disorders? Hemophilia,  Sickle Cell, Other? Explain. Yes    No  Surgery? (List all.)  When? What for? Yes    No    Diabetes? Yes    No  Serious injury or illness? Yes    No    Head injury/Concussion/Passed out? Yes    No  TB skin test positive (past/present)? * Yes    No *If yes, refer to local [facility] dept   Seizures? What are they like?  Yes    No  TB disease (past or present)? * Yes    No *If yes, refer to local [facility] dept   Heart problem/Shortness of breath?  Yes    No  Tobacco use (type, frequency)?  Yes    No    Heart murmur/High blood pressure? Yes    No  Alcohol/Drug use?  Yes    No    Dizziness or chest pain with exercise? Yes    No  Family history of sudden death  before age 50? (Cause?) Yes    No    Eye/Vision problems? ______           __Glasses          __Contacts  Last exam by eye doctor ______  Other concerns? (crossed eye, drooping lids, squinting, difficulty reading) Dental?   __ Braces     __ Bridge     __ Plate   __Other   Ear/Hearing problems? Yes    No  Information may be shared with appropriate personnel for health and educational purposes.   Bone/Joint problem/injury/scoliosis? Yes    No  Parent/Guardian  Signature                                               Date   PHYSICAL EXAMINATION REQUIREMENTS   Entire section below to be completed by MD/DO/APN/PA Head Circumference if <2-3 years old - BP (!) 118/68   Pulse 118   Temp 97.6 °F (36.4 °C) (Temporal)   Ht 3' 8\"  (1.118 m)   Wt 19.1 kg (42 lb)   BMI 15.25 kg/m²   BSA 0.77 m²    53 %ile (Z= 0.08) based on CDC (Girls, 2-20 Years) BMI-for-age based on BMI available as of 10/18/2023.   DIABETES SCREENING (NOT REQUIRED FOR ) BMI>85% age/sex: No     And any two of the following: Family History: No  Ethnic Minority: No    Signs of Insulin Resistance (hypertension, dyslipidemia, polycystic ovarian syndrome, acanthosis nigricans): No  At Risk: No   LEAD RISK QUESTIONNAIRE Required for children age 6 months through 6 years enrolled in licensed or public school operated day care, , nursery school and/or . (Blood test required if resides in [place] or high risk zip code.)  Questionnaire Administered? NA  Blood Test Indicated? NA   Blood Test Date/Result:    TB SKIN OR BLOOD TEST Recommended only for children in high-risk groups including children immunosuppressed due to HIV infection or other conditions, frequent travel to or born in high prevalence countries or those exposed to adults in high-risk categories. See CDC guidelines. http://www.cdc.gov/tb/publications/factsheets/testing/TB_testing.htm.  Test Needed: No     Test performed: No                          Skin Test:    Date Read              /      /             Result:   Positive__      Negative__        mm________                          Blood Test: Date Reported       /      /               Result:  Positive__      Negative__      Value________  LAB TESTS (recommended) Date/Result  Date/Results   Hemoglobin or Hematocrit 13.3 (10/18/2023) Sickle Cell (when indicated)    Urinalysis NA Developmental Screening Tool Normal - ASQ        SYSTEM REVIEW Normal  Comments/Follow-up/Needs  Normal Comments/Follow-up/Needs   Skin  Yes  Endocrine Yes    Ears Yes                  Screening Result Gastrointestinal Yes    Eyes Yes                  Screening Result: Genito-Urinary Yes                                      LMP:    Nose Yes  Neurologic Yes    Throat  Yes  Musculoskeletal Yes    Mouth/Dental Yes  Spinal Exam Yes    Cardiovascular/HTN Yes  Nutritional status Yes    Respiratory Yes Diagnosis of Asthma: No   Mental Health Yes    Currently Prescribed Asthma Medication:        No  Quick-relief medication (e.g. Short Acting Beta Antagonist)        No  Controller medication (e.g. inhaled corticosteroid) Other     NEEDS/MODIFICATIONS required in the school setting: No restrictions DIETARY Needs/Restrictions: No restrictions   SPECIAL INSTRUCTIONS/DEVICES e.g. safety glasses, glass eye, chest protector for arrhythmia, pacemaker, prosthetic device, dental bridge, false teeth, athletic support/cup: No restrictions   MENTAL HEALTH/OTHER Is there anything else the school should know about this student?  If you would like to discuss this student’s health with school or school health personnel:  Not needed   EMERGENCY ACTION needed while at school due to child’s health condition (e.g. ,seizures, asthma, insect sting, food, peanut allergy, bleeding problem, diabetes, heart problem)?   No   On the basis of the examination on this day, I approve this child’s participation in                                (If No or Modified please attach explanation.)  PHYSICAL EDUCATION:  Yes                               INTER[employer]OL[employer]IC SPORTS   Yes   Print Name  Maryam Noel MD         Signature                                                                       Date: 10/18/2023   Address:  [facility] [address]  [facility] [address] [address]  [phone]         (Complete Both Sides)     Approved [facility] 3/2016

## 2023-11-03 ENCOUNTER — TELEPHONE (OUTPATIENT)
Dept: NEUROLOGY | Facility: CLINIC | Age: 48
End: 2023-11-03

## 2023-11-07 ENCOUNTER — CLINICAL SUPPORT (OUTPATIENT)
Dept: FAMILY MEDICINE CLINIC | Facility: CLINIC | Age: 48
End: 2023-11-07
Payer: COMMERCIAL

## 2023-11-07 DIAGNOSIS — E53.8 B12 DEFICIENCY: Primary | ICD-10-CM

## 2023-11-07 PROCEDURE — 96372 THER/PROPH/DIAG INJ SC/IM: CPT | Performed by: FAMILY MEDICINE

## 2023-11-07 RX ADMIN — CYANOCOBALAMIN 1000 MCG: 1000 INJECTION, SOLUTION INTRAMUSCULAR; SUBCUTANEOUS at 17:14

## 2023-11-12 DIAGNOSIS — E89.0 POSTOPERATIVE HYPOTHYROIDISM: ICD-10-CM

## 2023-11-13 RX ORDER — LEVOTHYROXINE SODIUM 0.05 MG/1
50 TABLET ORAL DAILY
Qty: 90 TABLET | Refills: 0 | Status: SHIPPED | OUTPATIENT
Start: 2023-11-13 | End: 2023-11-18 | Stop reason: SDUPTHER

## 2023-11-18 DIAGNOSIS — G62.9 POLYNEUROPATHY: ICD-10-CM

## 2023-11-18 DIAGNOSIS — E89.0 POSTOPERATIVE HYPOTHYROIDISM: ICD-10-CM

## 2023-11-18 RX ORDER — LEVOTHYROXINE SODIUM 0.2 MG/1
200 TABLET ORAL DAILY
Qty: 90 TABLET | Refills: 0 | Status: SHIPPED | OUTPATIENT
Start: 2023-11-18

## 2023-11-18 RX ORDER — GABAPENTIN 400 MG/1
400 CAPSULE ORAL
Qty: 90 CAPSULE | Refills: 1 | Status: SHIPPED | OUTPATIENT
Start: 2023-11-18 | End: 2024-05-16

## 2023-11-18 RX ORDER — LEVOTHYROXINE SODIUM 0.05 MG/1
50 TABLET ORAL DAILY
Qty: 90 TABLET | Refills: 0 | Status: SHIPPED | OUTPATIENT
Start: 2023-11-18

## 2023-11-21 ENCOUNTER — OFFICE VISIT (OUTPATIENT)
Dept: FAMILY MEDICINE CLINIC | Facility: CLINIC | Age: 48
End: 2023-11-21
Payer: COMMERCIAL

## 2023-11-21 VITALS
DIASTOLIC BLOOD PRESSURE: 78 MMHG | OXYGEN SATURATION: 99 % | WEIGHT: 225.2 LBS | HEART RATE: 65 BPM | BODY MASS INDEX: 34.13 KG/M2 | HEIGHT: 68 IN | TEMPERATURE: 96.7 F | RESPIRATION RATE: 16 BRPM | SYSTOLIC BLOOD PRESSURE: 120 MMHG

## 2023-11-21 DIAGNOSIS — R10.84 GENERALIZED ABDOMINAL PAIN: Primary | ICD-10-CM

## 2023-11-21 DIAGNOSIS — R10.11 RIGHT UPPER QUADRANT ABDOMINAL PAIN: ICD-10-CM

## 2023-11-21 DIAGNOSIS — R10.12 LEFT UPPER QUADRANT ABDOMINAL PAIN: ICD-10-CM

## 2023-11-21 PROCEDURE — 99213 OFFICE O/P EST LOW 20 MIN: CPT | Performed by: NURSE PRACTITIONER

## 2023-11-21 NOTE — PATIENT INSTRUCTIONS
Complete abdominal ultrasound. Monitor for any worsening changes. Please call the office if you are experiencing any worsening of symptoms or no symptom improvement.

## 2023-11-21 NOTE — PROGRESS NOTES
Name: Kenia Gonzales      : 1975      MRN: [id number]  Encounter Provider: DEE Gutierrez  Encounter Date: 2023   Encounter department: [address] [place] [address]     1. Generalized abdominal pain  -     US abdomen complete; Future; Expected date: 2023    2. Left upper quadrant abdominal pain  -     US abdomen complete; Future; Expected date: 2023    3. Right upper quadrant abdominal pain    Complete abdominal ultrasound. Monitor for any worsening changes. Red flag symptoms reviewed. We will follow up with US results. Keep track of symptoms. Make f/u with GI- due for EGD/colonoscopy . Please call the office if you are experiencing any worsening of symptoms or no symptom improvement. Subjective      Patient presents with: Follow-up: Pt is here for feeling full in her stomach area all the time. Pt is also having left sided pain in random areas. Colonoscopy done in . Bowel movements have fito mined stable. Every now and then will have fullness but no vomiting. Takes Pepcid complete over the counter. Symptoms present about 3-4 weeks. Wt Readings from Last 3 Encounters:  23 : 102 kg (225 lb 3.2 oz)  23 : 98.9 kg (218 lb)  23 : 100 kg (220 lb 6.4 oz)          Review of Systems   Constitutional:  Negative for chills and fever. Eyes:  Negative for discharge. Respiratory:  Negative for shortness of breath. Cardiovascular:  Negative for chest pain. Gastrointestinal:  Positive for abdominal pain. Negative for constipation and diarrhea. Genitourinary:  Negative for difficulty urinating. Musculoskeletal:  Negative for joint swelling. Skin:  Negative for rash. Neurological:  Negative for headaches. Hematological:  Negative for adenopathy. Psychiatric/Behavioral:  The patient is not nervous/anxious.         Current Outpatient Medications on File Prior to Visit   Medication Sig    acetaminophen (TYLENOL) 500 mg tablet Take 2 tablets (1,000 mg total) by mouth every 6 (six) hours as needed for mild pain    Cyanocobalamin (B-12) 1000 MCG/ML KIT Inject as directed every 30 (thirty) days    famotidine-calcium carbonate-magnesium hydroxide (Pepcid Complete) -165 MG CHEW Chew 1 tablet daily as needed for heartburn    gabapentin (NEURONTIN) 400 mg capsule Take 1 capsule (400 mg total) by mouth daily at bedtime    levothyroxine 200 mcg tablet Take 1 tablet (200 mcg total) by mouth daily    levothyroxine 50 mcg tablet Take 1 tablet (50 mcg total) by mouth daily    pseudoephedrine (SUDAFED) 60 mg tablet Take 60 mg by mouth every 4 (four) hours as needed for congestion    FOLIC ACID PO Take by mouth daily As needed  (Patient not taking: Reported on 11/21/2023)    rOPINIRole (REQUIP) 0.25 mg tablet TAKE 1 TABLET (0.25 MG TOTAL) BY MOUTH DAILY AT BEDTIME (Patient not taking: Reported on 11/21/2023)       Objective     /78   Pulse 65   Temp (!) 96.7 °F (35.9 °C) (Temporal)   Resp 16   Ht 5' 8" (1.727 m)   Wt 102 kg (225 lb 3.2 oz)   LMP  (LMP Unknown)   SpO2 99%   BMI 34.24 kg/m²     Physical Exam  Vitals and nursing note reviewed. Constitutional:       General: She is not in acute distress. Appearance: Normal appearance. She is well-developed. She is obese. She is not diaphoretic. HENT:      Head: Normocephalic and atraumatic. Right Ear: External ear normal.      Left Ear: External ear normal.   Eyes:      General: Lids are normal.         Right eye: No discharge. Left eye: No discharge. Conjunctiva/sclera: Conjunctivae normal.   Cardiovascular:      Rate and Rhythm: Normal rate and regular rhythm. Heart sounds: No murmur heard. Pulmonary:      Effort: Pulmonary effort is normal. No respiratory distress. Breath sounds: Normal breath sounds. No wheezing. Abdominal:      General: Bowel sounds are normal. There is no distension. Palpations: Abdomen is soft. Tenderness: There is abdominal tenderness in the right upper quadrant, epigastric area and left upper quadrant. There is no guarding or rebound. Positive signs include Smalls's sign (very mild). Negative signs include McBurney's sign. Musculoskeletal:         General: No deformity. Skin:     General: Skin is warm and dry. Neurological:      General: No focal deficit present. Mental Status: She is alert and oriented to person, place, and time. Psychiatric:         Speech: Speech normal.         Behavior: Behavior normal.         Thought Content:  Thought content normal.         Judgment: Judgment normal.       DEE Seymour

## 2023-11-25 ENCOUNTER — HOSPITAL ENCOUNTER (OUTPATIENT)
Dept: ULTRASOUND IMAGING | Facility: HOSPITAL | Age: 48
Discharge: HOME | End: 2023-11-25
Payer: COMMERCIAL

## 2023-11-25 DIAGNOSIS — R10.12 LEFT UPPER QUADRANT ABDOMINAL PAIN: ICD-10-CM

## 2023-11-25 DIAGNOSIS — R10.84 GENERALIZED ABDOMINAL PAIN: ICD-10-CM

## 2023-11-25 PROCEDURE — 76700 US EXAM ABDOM COMPLETE: CPT

## 2023-12-05 ENCOUNTER — CLINICAL SUPPORT (OUTPATIENT)
Dept: FAMILY MEDICINE CLINIC | Facility: CLINIC | Age: 48
End: 2023-12-05

## 2023-12-05 DIAGNOSIS — E53.8 B12 DEFICIENCY: Primary | ICD-10-CM

## 2024-01-09 ENCOUNTER — CLINICAL SUPPORT (OUTPATIENT)
Dept: FAMILY MEDICINE CLINIC | Facility: CLINIC | Age: 49
End: 2024-01-09
Payer: COMMERCIAL

## 2024-01-09 DIAGNOSIS — E53.8 B12 DEFICIENCY: Primary | ICD-10-CM

## 2024-01-09 PROCEDURE — 96372 THER/PROPH/DIAG INJ SC/IM: CPT

## 2024-01-09 RX ADMIN — CYANOCOBALAMIN 1000 MCG: 1000 INJECTION, SOLUTION INTRAMUSCULAR; SUBCUTANEOUS at 16:57

## 2024-02-05 ENCOUNTER — OFFICE VISIT (OUTPATIENT)
Dept: FAMILY MEDICINE CLINIC | Facility: CLINIC | Age: 49
End: 2024-02-05
Payer: COMMERCIAL

## 2024-02-05 VITALS
DIASTOLIC BLOOD PRESSURE: 74 MMHG | HEART RATE: 82 BPM | RESPIRATION RATE: 16 BRPM | WEIGHT: 230.2 LBS | OXYGEN SATURATION: 98 % | SYSTOLIC BLOOD PRESSURE: 120 MMHG | BODY MASS INDEX: 34.89 KG/M2 | HEIGHT: 68 IN | TEMPERATURE: 96.9 F

## 2024-02-05 DIAGNOSIS — K21.9 GASTROESOPHAGEAL REFLUX DISEASE WITHOUT ESOPHAGITIS: Primary | ICD-10-CM

## 2024-02-05 DIAGNOSIS — H69.92 DYSFUNCTION OF LEFT EUSTACHIAN TUBE: ICD-10-CM

## 2024-02-05 DIAGNOSIS — E89.0 POSTOPERATIVE HYPOTHYROIDISM: ICD-10-CM

## 2024-02-05 DIAGNOSIS — H53.9 VISUAL CHANGES: ICD-10-CM

## 2024-02-05 DIAGNOSIS — E53.8 B12 DEFICIENCY: ICD-10-CM

## 2024-02-05 PROCEDURE — 99214 OFFICE O/P EST MOD 30 MIN: CPT | Performed by: NURSE PRACTITIONER

## 2024-02-05 RX ORDER — LEVOTHYROXINE SODIUM 0.05 MG/1
50 TABLET ORAL DAILY
Qty: 90 TABLET | Refills: 1 | Status: SHIPPED | OUTPATIENT
Start: 2024-02-05

## 2024-02-05 RX ORDER — LEVOTHYROXINE SODIUM 0.2 MG/1
200 TABLET ORAL DAILY
Qty: 90 TABLET | Refills: 1 | Status: SHIPPED | OUTPATIENT
Start: 2024-02-05

## 2024-02-05 NOTE — PATIENT INSTRUCTIONS
Follow up with eye doctor.     Complete labs, these are fasting.     B12 next week.     Push back gabapentin dosing as discussed.     Please call the office if you are experiencing any worsening of symptoms or no symptom improvement.

## 2024-02-05 NOTE — PROGRESS NOTES
Name: Kayode Washburn      : 1975      MRN: [id number]  Encounter Provider: DEE Hardy  Encounter Date: 2024   Encounter department: [facility] PRIMARY CARE    Assessment & Plan     1. Gastroesophageal reflux disease without esophagitis    2. Postoperative hypothyroidism  Assessment & Plan:  On 250 mcg daily M-Fr. Doesn't take on weekends.     Orders:  -     levothyroxine 50 mcg tablet; Take 1 tablet (50 mcg total) by mouth daily  -     levothyroxine 200 mcg tablet; Take 1 tablet (200 mcg total) by mouth daily    3. B12 deficiency    4. Dysfunction of left eustachian tube         Follow up with eye doctor as soon as possible for exam/screenings. Discussed differentials and red flags.  Complete labs, these are fasting.   B12 next week.   Push back gabapentin dosing as discussed.   Will adjust gabapentin closer to bedtime. If needed can add afternoon dose of 100 mg , she will call if indicated and we can send 100 mg dose.   Recheck 6 months for well exam.   Recommended flonase for ears.   Please call the office if you are experiencing any worsening of symptoms or no symptom improvement.         Subjective      Here for 6 month follow up. Labs previously ordered to be completed.     Was doing well on gabapentin but starting to have burning again. Taking 400 mg around 6 pm. Symptoms happen through the night.     Gets blur in eye, expands, passes in 20 minutes. She had a doctor look in her eyes and couldn't see anything. Happens 3-4 x per month. Unsure of pattern. It happens in both eyes. She never loses her vision. Has never seen eye doctor before. No darkness. Doesn't wear glasses. Unsure of pattern.    Clicking / popping in ears at times. Requesting to have them looked at.       Review of Systems   Constitutional:  Negative for chills and fever.   Eyes:  Negative for discharge.   Respiratory:  Negative for shortness of breath.    Cardiovascular:  Negative for chest pain.  "  Gastrointestinal:  Negative for constipation and diarrhea.   Genitourinary:  Negative for difficulty urinating.   Musculoskeletal:  Negative for joint swelling.   Skin:  Negative for rash.   Neurological:  Negative for headaches.   Hematological:  Negative for adenopathy.   Psychiatric/Behavioral:  The patient is not nervous/anxious.        Current Outpatient Medications on File Prior to Visit   Medication Sig    acetaminophen (TYLENOL) 500 mg tablet Take 2 tablets (1,000 mg total) by mouth every 6 (six) hours as needed for mild pain    Cyanocobalamin (B-12) 1000 MCG/ML KIT Inject as directed every 30 (thirty) days    famotidine-calcium carbonate-magnesium hydroxide (Pepcid Complete) -165 MG CHEW Chew 1 tablet daily as needed for heartburn    gabapentin (NEURONTIN) 400 mg capsule Take 1 capsule (400 mg total) by mouth daily at bedtime    pseudoephedrine (SUDAFED) 60 mg tablet Take 60 mg by mouth every 4 (four) hours as needed for congestion    [DISCONTINUED] levothyroxine 200 mcg tablet Take 1 tablet (200 mcg total) by mouth daily    [DISCONTINUED] levothyroxine 50 mcg tablet Take 1 tablet (50 mcg total) by mouth daily    FOLIC ACID PO Take by mouth daily As needed  (Patient not taking: Reported on 11/21/2023)    [DISCONTINUED] rOPINIRole (REQUIP) 0.25 mg tablet TAKE 1 TABLET (0.25 MG TOTAL) BY MOUTH DAILY AT BEDTIME (Patient not taking: Reported on 11/21/2023)       Objective     /74   Pulse 82   Temp (!) 96.9 °F (36.1 °C) (Temporal)   Resp 16   Ht 5' 8\" (1.727 m)   Wt 104 kg (230 lb 3.2 oz)   LMP  (LMP Unknown)   SpO2 98%   BMI 35.00 kg/m²     Physical Exam  Vitals and nursing note reviewed.   Constitutional:       General: She is not in acute distress.     Appearance: Normal appearance. She is well-developed. She is obese. She is not diaphoretic.   HENT:      Head: Normocephalic and atraumatic.      Right Ear: External ear normal. Tympanic membrane is not perforated, erythematous, retracted " or bulging.      Left Ear: External ear normal. Tympanic membrane is retracted. Tympanic membrane is not perforated, erythematous or bulging.   Eyes:      General: Lids are normal.         Right eye: No discharge.         Left eye: No discharge.      Conjunctiva/sclera: Conjunctivae normal.   Cardiovascular:      Rate and Rhythm: Normal rate and regular rhythm.      Heart sounds: No murmur heard.  Pulmonary:      Effort: Pulmonary effort is normal. No respiratory distress.      Breath sounds: Normal breath sounds. No wheezing.   Musculoskeletal:         General: No deformity.   Skin:     General: Skin is warm and dry.   Neurological:      General: No focal deficit present.      Mental Status: She is alert and oriented to person, place, and time.   Psychiatric:         Speech: Speech normal.         Behavior: Behavior normal.         Thought Content: Thought content normal.         Judgment: Judgment normal.       DEE Hardy

## 2024-02-12 ENCOUNTER — CLINICAL SUPPORT (OUTPATIENT)
Dept: FAMILY MEDICINE CLINIC | Facility: CLINIC | Age: 49
End: 2024-02-12
Payer: COMMERCIAL

## 2024-02-12 ENCOUNTER — APPOINTMENT (OUTPATIENT)
Dept: LAB | Facility: CLINIC | Age: 49
End: 2024-02-12
Payer: COMMERCIAL

## 2024-02-12 DIAGNOSIS — E55.9 VITAMIN D DEFICIENCY: ICD-10-CM

## 2024-02-12 DIAGNOSIS — R73.01 ELEVATED FASTING BLOOD SUGAR: ICD-10-CM

## 2024-02-12 DIAGNOSIS — E89.0 POSTOPERATIVE HYPOTHYROIDISM: ICD-10-CM

## 2024-02-12 DIAGNOSIS — G62.9 POLYNEUROPATHY: ICD-10-CM

## 2024-02-12 DIAGNOSIS — E53.8 VITAMIN B 12 DEFICIENCY: ICD-10-CM

## 2024-02-12 DIAGNOSIS — E53.8 B12 DEFICIENCY: Primary | ICD-10-CM

## 2024-02-12 DIAGNOSIS — G25.81 RESTLESS LEG SYNDROME: ICD-10-CM

## 2024-02-12 LAB
25(OH)D3 SERPL-MCNC: 8.5 NG/ML (ref 30–100)
FERRITIN SERPL-MCNC: 7 NG/ML (ref 11–307)
TSH SERPL DL<=0.05 MIU/L-ACNC: 1.81 UIU/ML (ref 0.45–4.5)

## 2024-02-12 PROCEDURE — 96372 THER/PROPH/DIAG INJ SC/IM: CPT

## 2024-02-12 PROCEDURE — 82728 ASSAY OF FERRITIN: CPT

## 2024-02-12 PROCEDURE — 82607 VITAMIN B-12: CPT

## 2024-02-12 PROCEDURE — 83036 HEMOGLOBIN GLYCOSYLATED A1C: CPT

## 2024-02-12 PROCEDURE — 82306 VITAMIN D 25 HYDROXY: CPT

## 2024-02-12 PROCEDURE — 84443 ASSAY THYROID STIM HORMONE: CPT

## 2024-02-12 PROCEDURE — 36415 COLL VENOUS BLD VENIPUNCTURE: CPT

## 2024-02-12 RX ADMIN — CYANOCOBALAMIN 1000 MCG: 1000 INJECTION, SOLUTION INTRAMUSCULAR; SUBCUTANEOUS at 14:48

## 2024-02-13 LAB
EST. AVERAGE GLUCOSE BLD GHB EST-MCNC: 137 MG/DL
HBA1C MFR BLD: 6.4 %

## 2024-02-15 DIAGNOSIS — R79.0 LOW FERRITIN: Primary | ICD-10-CM

## 2024-02-15 LAB — VIT B12 SERPL-MCNC: 425 PG/ML (ref 180–914)

## 2024-03-12 ENCOUNTER — CLINICAL SUPPORT (OUTPATIENT)
Dept: FAMILY MEDICINE CLINIC | Facility: CLINIC | Age: 49
End: 2024-03-12
Payer: COMMERCIAL

## 2024-03-12 DIAGNOSIS — E53.8 B12 DEFICIENCY: Primary | ICD-10-CM

## 2024-03-12 PROCEDURE — 96372 THER/PROPH/DIAG INJ SC/IM: CPT

## 2024-03-12 RX ADMIN — CYANOCOBALAMIN 1000 MCG: 1000 INJECTION, SOLUTION INTRAMUSCULAR; SUBCUTANEOUS at 15:24

## 2024-04-15 ENCOUNTER — CLINICAL SUPPORT (OUTPATIENT)
Dept: FAMILY MEDICINE CLINIC | Facility: CLINIC | Age: 49
End: 2024-04-15
Payer: COMMERCIAL

## 2024-04-15 DIAGNOSIS — E53.8 B12 DEFICIENCY: Primary | ICD-10-CM

## 2024-04-15 PROCEDURE — 96372 THER/PROPH/DIAG INJ SC/IM: CPT | Performed by: NURSE PRACTITIONER

## 2024-04-15 RX ADMIN — CYANOCOBALAMIN 1000 MCG: 1000 INJECTION, SOLUTION INTRAMUSCULAR; SUBCUTANEOUS at 16:50

## 2024-05-10 DIAGNOSIS — G62.9 POLYNEUROPATHY: ICD-10-CM

## 2024-05-13 RX ORDER — GABAPENTIN 400 MG/1
400 CAPSULE ORAL
Qty: 90 CAPSULE | Refills: 1 | Status: SHIPPED | OUTPATIENT
Start: 2024-05-13 | End: 2024-11-09

## 2024-05-15 ENCOUNTER — CLINICAL SUPPORT (OUTPATIENT)
Dept: FAMILY MEDICINE CLINIC | Facility: CLINIC | Age: 49
End: 2024-05-15
Payer: COMMERCIAL

## 2024-05-15 DIAGNOSIS — E53.8 B12 DEFICIENCY: Primary | ICD-10-CM

## 2024-05-15 PROCEDURE — 96372 THER/PROPH/DIAG INJ SC/IM: CPT

## 2024-05-15 RX ADMIN — CYANOCOBALAMIN 1000 MCG: 1000 INJECTION, SOLUTION INTRAMUSCULAR; SUBCUTANEOUS at 16:24

## 2024-05-24 ENCOUNTER — OFFICE VISIT (OUTPATIENT)
Dept: FAMILY MEDICINE CLINIC | Facility: CLINIC | Age: 49
End: 2024-05-24
Payer: COMMERCIAL

## 2024-05-24 VITALS
BODY MASS INDEX: 34.56 KG/M2 | WEIGHT: 228 LBS | HEART RATE: 66 BPM | TEMPERATURE: 97.1 F | HEIGHT: 68 IN | OXYGEN SATURATION: 98 %

## 2024-05-24 DIAGNOSIS — G25.81 RLS (RESTLESS LEGS SYNDROME): ICD-10-CM

## 2024-05-24 DIAGNOSIS — G60.8 IDIOPATHIC SMALL FIBER SENSORY NEUROPATHY: ICD-10-CM

## 2024-05-24 DIAGNOSIS — J02.9 PHARYNGITIS, UNSPECIFIED ETIOLOGY: Primary | ICD-10-CM

## 2024-05-24 DIAGNOSIS — G62.9 POLYNEUROPATHY: ICD-10-CM

## 2024-05-24 PROCEDURE — 99213 OFFICE O/P EST LOW 20 MIN: CPT | Performed by: FAMILY MEDICINE

## 2024-05-24 RX ORDER — AZITHROMYCIN 250 MG/1
TABLET, FILM COATED ORAL
Qty: 6 TABLET | Refills: 0 | Status: SHIPPED | OUTPATIENT
Start: 2024-05-24 | End: 2024-05-28

## 2024-05-24 RX ORDER — GABAPENTIN 100 MG/1
100 CAPSULE ORAL 2 TIMES DAILY
Qty: 60 CAPSULE | Refills: 5 | Status: SHIPPED | OUTPATIENT
Start: 2024-05-24

## 2024-05-24 NOTE — PROGRESS NOTES
Ambulatory Visit  Name: Kayode Washburn      : 1975      MRN: [id number]  Encounter Provider: Maricel Cazares DO  Encounter Date: 2024   Encounter department: [facility] PRIMARY CARE    Assessment & Plan   1. Pharyngitis, unspecified etiology  -     azithromycin (ZITHROMAX) 250 mg tablet; Take 2 tablets today then 1 tablet daily x 4 days  2. Idiopathic small fiber sensory neuropathy  -     gabapentin (NEURONTIN) 100 mg capsule; Take 1 capsule (100 mg total) by mouth 2 (two) times a day In addition to 400 mg hs  3. Polyneuropathy  -     gabapentin (NEURONTIN) 100 mg capsule; Take 1 capsule (100 mg total) by mouth 2 (two) times a day In addition to 400 mg hs  4. RLS (restless legs syndrome)  -     gabapentin (NEURONTIN) 100 mg capsule; Take 1 capsule (100 mg total) by mouth 2 (two) times a day In addition to 400 mg hs         History of Present Illness   Here for URI    Sore Throat   This is a new problem. Episode onset: few day. The problem has been waxing and waning. The pain is worse on the left side. There has been no fever. The patient is experiencing no pain. Associated symptoms include congestion and ear pain (left). Pertinent negatives include no diarrhea. She has tried acetaminophen (sudafed) for the symptoms.     Review of Systems   HENT:  Positive for congestion, ear pain (left) and sore throat.    Gastrointestinal:  Negative for diarrhea.     Past Medical History:   Diagnosis Date   • Abnormal Pap smear of cervix     with ASUS favoring benign, last assessed 14   • Abnormal uterine bleeding (AUB)     ongoing menses x  4 months- D&C/ablation today 6/3/2022   • Disease of thyroid gland     total thyroidectomy- goiter   • Mild acid reflux    • Paresthesia of foot, bilateral    • Sinus headache    • Tobacco abuse      Past Surgical History:   Procedure Laterality Date   • BREAST BIOPSY Left     benign   • COLONOSCOPY     • CA HYSTEROSCOPY BX ENDOMETRIUM&/POLYPC W/WO D&C N/A  06/03/2022    Procedure: D&C WITH HYSTEROSCOPY;  Surgeon: Kayode Chua MD;  Location: [facility];  Service: Gynecology   • MT HYSTEROSCOPY ENDOMETRIAL ABLATION N/A 06/03/2022    Procedure: NOVASURE;  Surgeon: Kayode Chua MD;  Location: [facility];  Service: Gynecology   • TONSILLECTOMY AND ADENOIDECTOMY     • TOTAL THYROIDECTOMY N/A 06/21/2014    Dr. Todd   • US GUIDED BREAST BIOPSY LEFT COMPLETE Left      Family History   Problem Relation Age of Onset   • Diabetes Son    • Diabetes type II Mother    • Hypertension Father         benign essential   • No Known Problems Maternal Grandmother    • Heart attack Maternal Grandfather    • Heart disease Maternal Grandfather    • Dementia Paternal Grandmother    • No Known Problems Paternal Grandfather    • No Known Problems Maternal Aunt    • Breast cancer Maternal Aunt 68   • No Known Problems Maternal Aunt    • Diabetes Brother    • Colon cancer Neg Hx    • Ovarian cancer Neg Hx      Social History     Tobacco Use   • Smoking status: Every Day     Current packs/day: 1.50     Average packs/day: 1.5 packs/day for 28.0 years (42.0 ttl pk-yrs)     Types: Cigarettes   • Smokeless tobacco: Never   Vaping Use   • Vaping status: Never Used   Substance and Sexual Activity   • Alcohol use: Yes     Alcohol/week: 3.0 standard drinks of alcohol     Types: 3 Shots of liquor per week     Comment: 4 x  monthly   • Drug use: No   • Sexual activity: Not Currently     Partners: Male     Birth control/protection: None     Current Outpatient Medications on File Prior to Visit   Medication Sig   • acetaminophen (TYLENOL) 500 mg tablet Take 2 tablets (1,000 mg total) by mouth every 6 (six) hours as needed for mild pain   • Cyanocobalamin (B-12) 1000 MCG/ML KIT Inject as directed every 30 (thirty) days   • famotidine-calcium carbonate-magnesium hydroxide (Pepcid Complete) -165 MG CHEW Chew 1 tablet daily as needed for heartburn   • gabapentin (NEURONTIN) 400 mg capsule TAKE 1  "CAPSULE (400 MG TOTAL) BY MOUTH DAILY AT BEDTIME   • levothyroxine 200 mcg tablet Take 1 tablet (200 mcg total) by mouth daily   • levothyroxine 50 mcg tablet Take 1 tablet (50 mcg total) by mouth daily   • pseudoephedrine (SUDAFED) 60 mg tablet Take 60 mg by mouth every 4 (four) hours as needed for congestion     Allergies   Allergen Reactions   • Lamisil [Terbinafine]    • Prednisone      Rapid heart beat and palpitations      Immunization History   Administered Date(s) Administered   • COVID-19 MODERNA VACC 0.5 ML IM 02/19/2021   • Hep B, adult 01/15/2016   • INFLUENZA 01/19/2016, 01/19/2016, 11/07/2018, 10/30/2019, 10/20/2020, 10/29/2021   • Influenza Quadrivalent, 6-35 Months IM 01/05/2016   • MMR 02/11/2016   • Pneumococcal Polysaccharide PPV23 05/06/2021   • Tdap 01/22/2016   • Tuberculin Skin Test 11/07/2018   • Tuberculin Skin Test-PPD Intradermal 01/05/2016, 01/22/2016     Objective     Pulse 66   Temp (!) 97.1 °F (36.2 °C) (Temporal)   Ht 5' 8\" (1.727 m)   Wt 103 kg (228 lb)   LMP  (LMP Unknown)   SpO2 98%   BMI 34.67 kg/m²     Physical Exam  Vitals reviewed.   Constitutional:       Appearance: She is well-developed.   HENT:      Right Ear: Tympanic membrane is retracted.      Left Ear: Tympanic membrane is retracted.      Nose: Rhinorrhea present.      Mouth/Throat:      Mouth: Mucous membranes are moist.      Pharynx: Posterior oropharyngeal erythema present.   Cardiovascular:      Rate and Rhythm: Normal rate and regular rhythm.   Pulmonary:      Breath sounds: No wheezing.   Skin:     Findings: No rash.   Neurological:      Mental Status: She is alert and oriented to person, place, and time.   Psychiatric:         Mood and Affect: Mood normal.         Behavior: Behavior normal.         Thought Content: Thought content normal.         Judgment: Judgment normal.           "

## 2024-06-18 ENCOUNTER — CLINICAL SUPPORT (OUTPATIENT)
Dept: FAMILY MEDICINE CLINIC | Facility: CLINIC | Age: 49
End: 2024-06-18
Payer: COMMERCIAL

## 2024-06-18 DIAGNOSIS — E53.8 B12 DEFICIENCY: Primary | ICD-10-CM

## 2024-06-18 PROCEDURE — 99211 OFF/OP EST MAY X REQ PHY/QHP: CPT

## 2024-06-18 PROCEDURE — 96372 THER/PROPH/DIAG INJ SC/IM: CPT

## 2024-06-18 RX ADMIN — CYANOCOBALAMIN 1000 MCG: 1000 INJECTION, SOLUTION INTRAMUSCULAR; SUBCUTANEOUS at 15:10

## 2024-07-23 ENCOUNTER — CLINICAL SUPPORT (OUTPATIENT)
Dept: FAMILY MEDICINE CLINIC | Facility: CLINIC | Age: 49
End: 2024-07-23
Payer: COMMERCIAL

## 2024-07-23 DIAGNOSIS — E53.8 VITAMIN B 12 DEFICIENCY: Primary | ICD-10-CM

## 2024-07-23 PROCEDURE — 96372 THER/PROPH/DIAG INJ SC/IM: CPT

## 2024-07-23 RX ADMIN — CYANOCOBALAMIN 1000 MCG: 1000 INJECTION, SOLUTION INTRAMUSCULAR; SUBCUTANEOUS at 16:42

## 2024-07-29 DIAGNOSIS — E89.0 POSTOPERATIVE HYPOTHYROIDISM: ICD-10-CM

## 2024-07-30 RX ORDER — LEVOTHYROXINE SODIUM 0.05 MG/1
50 TABLET ORAL DAILY
Qty: 90 TABLET | Refills: 1 | Status: SHIPPED | OUTPATIENT
Start: 2024-07-30

## 2024-07-30 RX ORDER — LEVOTHYROXINE SODIUM 0.2 MG/1
200 TABLET ORAL DAILY
Qty: 90 TABLET | Refills: 1 | Status: SHIPPED | OUTPATIENT
Start: 2024-07-30

## 2024-08-16 ENCOUNTER — OFFICE VISIT (OUTPATIENT)
Dept: FAMILY MEDICINE CLINIC | Facility: CLINIC | Age: 49
End: 2024-08-16
Payer: COMMERCIAL

## 2024-08-16 VITALS
BODY MASS INDEX: 34.56 KG/M2 | WEIGHT: 228 LBS | HEART RATE: 86 BPM | TEMPERATURE: 96.5 F | OXYGEN SATURATION: 98 % | DIASTOLIC BLOOD PRESSURE: 76 MMHG | HEIGHT: 68 IN | SYSTOLIC BLOOD PRESSURE: 128 MMHG

## 2024-08-16 DIAGNOSIS — M25.511 ACUTE PAIN OF RIGHT SHOULDER: Primary | ICD-10-CM

## 2024-08-16 DIAGNOSIS — M25.811 MASS OF JOINT OF RIGHT SHOULDER: ICD-10-CM

## 2024-08-16 DIAGNOSIS — Z12.31 ENCOUNTER FOR SCREENING MAMMOGRAM FOR BREAST CANCER: ICD-10-CM

## 2024-08-16 PROCEDURE — 99213 OFFICE O/P EST LOW 20 MIN: CPT | Performed by: NURSE PRACTITIONER

## 2024-08-16 NOTE — PATIENT INSTRUCTIONS
Complete x ray of right shoulder.     Will follow up with results.     Complete mammogram and colonoscopy.     Please call the office if you are experiencing any worsening of symptoms or no symptom improvement.

## 2024-08-16 NOTE — PROGRESS NOTES
"Ambulatory Visit  Name: Kayode Washburn      : 1975      MRN: [id number]  Encounter Provider: DEE Hardy  Encounter Date: 2024   Encounter department: [facility] PRIMARY CARE    Assessment & Plan   1. Acute pain of right shoulder  -     XR shoulder 2+ vw right; Future; Expected date: 2024  2. Encounter for screening mammogram for breast cancer  -     Mammo screening bilateral w 3d & cad; Future  3. Mass of joint of right shoulder  -     XR shoulder 2+ vw right; Future; Expected date: 2024  Complete x ray of right shoulder.   Will follow up with results.   Complete mammogram and colonoscopy.   Please call the office if you are experiencing any worsening of symptoms or no symptom improvement.      History of Present Illness     Here for evaluation of bump on right shoulder.   Bump right shoulder 1.5 months ago- more prominent over time. Mild discomfort. No injury. No swelling. No limitations of ROM. No pain with movements. Right arm feels heavier with it.     Shoulder Pain   Pertinent negatives include no fever.       Review of Systems   Constitutional:  Negative for chills and fever.   Eyes:  Negative for discharge.   Respiratory:  Negative for shortness of breath.    Cardiovascular:  Negative for chest pain.   Gastrointestinal:  Negative for constipation and diarrhea.   Genitourinary:  Negative for difficulty urinating.   Musculoskeletal:  Positive for arthralgias. Negative for joint swelling.   Skin:  Negative for rash.   Neurological:  Negative for headaches.   Hematological:  Negative for adenopathy.   Psychiatric/Behavioral:  The patient is not nervous/anxious.        Objective     /76 (BP Location: Left arm, Patient Position: Sitting, Cuff Size: Large)   Pulse 86   Temp (!) 96.5 °F (35.8 °C) (Temporal)   Ht 5' 8\" (1.727 m)   Wt 103 kg (228 lb)   LMP  (LMP Unknown)   SpO2 98%   BMI 34.67 kg/m²     Physical Exam  Vitals and nursing note reviewed. "   Constitutional:       General: She is not in acute distress.     Appearance: Normal appearance. She is well-developed. She is not diaphoretic.   HENT:      Head: Normocephalic and atraumatic.      Right Ear: External ear normal.      Left Ear: External ear normal.   Eyes:      General: Lids are normal.         Right eye: No discharge.         Left eye: No discharge.      Conjunctiva/sclera: Conjunctivae normal.   Pulmonary:      Effort: Pulmonary effort is normal. No respiratory distress.   Musculoskeletal:         General: Deformity present.        Arms:    Skin:     General: Skin is warm and dry.   Neurological:      General: No focal deficit present.      Mental Status: She is alert and oriented to person, place, and time.   Psychiatric:         Speech: Speech normal.         Behavior: Behavior normal.         Thought Content: Thought content normal.         Judgment: Judgment normal.       Administrative Statements

## 2024-08-23 ENCOUNTER — HOSPITAL ENCOUNTER (OUTPATIENT)
Dept: RADIOLOGY | Facility: HOSPITAL | Age: 49
Discharge: HOME | End: 2024-08-23
Payer: COMMERCIAL

## 2024-08-23 DIAGNOSIS — M25.511 ACUTE PAIN OF RIGHT SHOULDER: ICD-10-CM

## 2024-08-23 DIAGNOSIS — M25.811 MASS OF JOINT OF RIGHT SHOULDER: ICD-10-CM

## 2024-08-23 PROCEDURE — 73030 X-RAY EXAM OF SHOULDER: CPT

## 2024-08-27 ENCOUNTER — CLINICAL SUPPORT (OUTPATIENT)
Dept: FAMILY MEDICINE CLINIC | Facility: CLINIC | Age: 49
End: 2024-08-27
Payer: COMMERCIAL

## 2024-08-27 DIAGNOSIS — E53.8 B12 DEFICIENCY: Primary | ICD-10-CM

## 2024-08-27 PROCEDURE — 96372 THER/PROPH/DIAG INJ SC/IM: CPT

## 2024-08-27 RX ADMIN — CYANOCOBALAMIN 1000 MCG: 1000 INJECTION, SOLUTION INTRAMUSCULAR; SUBCUTANEOUS at 17:02

## 2024-08-28 ENCOUNTER — RA CDI HCC (OUTPATIENT)
Dept: OTHER | Facility: HOSPITAL | Age: 49
End: 2024-08-28

## 2024-09-05 ENCOUNTER — OFFICE VISIT (OUTPATIENT)
Dept: FAMILY MEDICINE CLINIC | Facility: CLINIC | Age: 49
End: 2024-09-05
Payer: COMMERCIAL

## 2024-09-05 VITALS
TEMPERATURE: 98 F | BODY MASS INDEX: 34.56 KG/M2 | HEART RATE: 72 BPM | SYSTOLIC BLOOD PRESSURE: 120 MMHG | OXYGEN SATURATION: 97 % | HEIGHT: 68 IN | DIASTOLIC BLOOD PRESSURE: 82 MMHG | WEIGHT: 228 LBS

## 2024-09-05 DIAGNOSIS — K21.9 GASTROESOPHAGEAL REFLUX DISEASE WITHOUT ESOPHAGITIS: ICD-10-CM

## 2024-09-05 DIAGNOSIS — E89.0 POSTOPERATIVE HYPOTHYROIDISM: ICD-10-CM

## 2024-09-05 DIAGNOSIS — E55.9 VITAMIN D DEFICIENCY: ICD-10-CM

## 2024-09-05 DIAGNOSIS — R79.0 LOW FERRITIN: ICD-10-CM

## 2024-09-05 DIAGNOSIS — M25.511 ACUTE PAIN OF RIGHT SHOULDER: ICD-10-CM

## 2024-09-05 DIAGNOSIS — G25.81 RLS (RESTLESS LEGS SYNDROME): ICD-10-CM

## 2024-09-05 DIAGNOSIS — E53.8 B12 DEFICIENCY: ICD-10-CM

## 2024-09-05 DIAGNOSIS — Z00.00 HEALTH MAINTENANCE EXAMINATION: Primary | ICD-10-CM

## 2024-09-05 DIAGNOSIS — N83.201 RIGHT OVARIAN CYST: ICD-10-CM

## 2024-09-05 DIAGNOSIS — G62.9 POLYNEUROPATHY: ICD-10-CM

## 2024-09-05 DIAGNOSIS — R73.03 PREDIABETES: ICD-10-CM

## 2024-09-05 DIAGNOSIS — G60.8 IDIOPATHIC SMALL FIBER SENSORY NEUROPATHY: ICD-10-CM

## 2024-09-05 DIAGNOSIS — Z72.0 TOBACCO USE: ICD-10-CM

## 2024-09-05 PROCEDURE — 99214 OFFICE O/P EST MOD 30 MIN: CPT | Performed by: NURSE PRACTITIONER

## 2024-09-05 PROCEDURE — 99396 PREV VISIT EST AGE 40-64: CPT | Performed by: NURSE PRACTITIONER

## 2024-09-05 RX ORDER — GABAPENTIN 100 MG/1
100 CAPSULE ORAL 2 TIMES DAILY
Qty: 60 CAPSULE | Refills: 5 | Status: SHIPPED | OUTPATIENT
Start: 2024-09-05

## 2024-09-05 RX ORDER — GABAPENTIN 400 MG/1
400 CAPSULE ORAL
Qty: 90 CAPSULE | Refills: 1 | Status: SHIPPED | OUTPATIENT
Start: 2024-09-05 | End: 2025-03-04

## 2024-09-05 NOTE — PROGRESS NOTES
Adult Annual Physical  Name: Kayode Washburn      : 1975      MRN: [id number]  Encounter Provider: DEE Hardy  Encounter Date: 2024   Encounter department: [facility] PRIMARY [facility]ARE    Assessment & Plan   1. Health maintenance examination  -     Lipid panel; Future  -     Comprehensive metabolic panel; Future  2. B12 deficiency  -     Vitamin B12; Future  3. Tobacco use  Assessment & Plan:  Encouraged cessation. Discussed screening recommendations in near future  4. Vitamin D deficiency  -     Vitamin D 25 hydroxy; Future  5. Low ferritin  6. Prediabetes  -     Hemoglobin A1C; Future  7. Postoperative hypothyroidism  Assessment & Plan:  Will update labs. Full thyroidectomy.    Orders:  -     TSH, 3rd generation; Future  -     T4, free; Future  8. Acute pain of right shoulder  -     Ambulatory Referral to Orthopedic Surgery; Future  9. Right ovarian cyst  Assessment & Plan:  4.2 cm right ovarian cyst with thin internal septation. No solid mural nodule. Based on the ACR O-RADS system, this is O-RADS category 3 (low-risk with 1% to <10% risk of malignancy.) The management recommendation is management by gynecologist.     Following with GYN- had procedure 2022. F/u with GYN.   10. Gastroesophageal reflux disease without esophagitis  Assessment & Plan:  Continue with famotidine PRN    Complete labs- these are fasting.   Follow up with ortho.   Follow up with GYN.  Consider lung cancer screening in next year or so.   Complete mammogram.  Complete colonoscopy.   Please call the office if you are experiencing any worsening of symptoms or no symptom improvement.   Return 6 months or sooner if needed.    Immunizations and preventive care screenings were discussed with patient today. Appropriate education was printed on patient's after visit summary.    Counseling:  Dental Health: discussed importance of regular tooth brushing, flossing, and dental visits.  Exercise: the importance of  "regular exercise/physical activity was discussed. Recommend exercise 3-5 times per week for at least 30 minutes.          History of Present Illness     Adult Annual Physical:  Patient presents for annual physical. Here for well exam. .     Diet and Physical Activity:  - Diet/Nutrition: poor diet.  - Exercise: no formal exercise.    General Health:  - Sleep: sleeps well. hit or miss due to pain  - Vision: most recent eye exam > 1 year ago. needs to make appointment  - Dental: no dental visits for > 1 year. needs to make appointment    Review of Systems   Constitutional:  Negative for chills and fever.   Eyes:  Negative for discharge.   Respiratory:  Negative for shortness of breath.    Cardiovascular:  Negative for chest pain.   Gastrointestinal:  Negative for constipation and diarrhea.   Genitourinary:  Negative for difficulty urinating.   Musculoskeletal:  Positive for arthralgias. Negative for joint swelling.   Skin:  Negative for rash.   Neurological:  Negative for headaches.   Hematological:  Negative for adenopathy.   Psychiatric/Behavioral:  The patient is not nervous/anxious.          Objective     /82   Pulse 72   Temp 98 °F (36.7 °C)   Ht 5' 8\" (1.727 m)   Wt 103 kg (228 lb)   LMP  (LMP Unknown)   SpO2 97%   BMI 34.67 kg/m²     Physical Exam  Vitals and nursing note reviewed.   Constitutional:       General: She is not in acute distress.     Appearance: Normal appearance. She is obese. She is not ill-appearing, toxic-appearing or diaphoretic.   HENT:      Head: Normocephalic and atraumatic.      Right Ear: Tympanic membrane, ear canal and external ear normal. There is no impacted cerumen.      Left Ear: Tympanic membrane, ear canal and external ear normal. There is no impacted cerumen.      Nose: Nose normal.      Mouth/Throat:      Mouth: Mucous membranes are moist.      Pharynx: Oropharynx is clear. No oropharyngeal exudate or posterior oropharyngeal erythema.   Eyes:      General: Lids are " normal. No scleral icterus.        Right eye: No discharge.         Left eye: No discharge.      Extraocular Movements: Extraocular movements intact.      Conjunctiva/sclera: Conjunctivae normal.      Pupils: Pupils are equal, round, and reactive to light.   Cardiovascular:      Rate and Rhythm: Normal rate and regular rhythm. No extrasystoles are present.     Heart sounds: S1 normal and S2 normal. No murmur heard.  Pulmonary:      Effort: Pulmonary effort is normal. No respiratory distress.      Breath sounds: Normal breath sounds. No stridor or decreased air movement. No wheezing or rhonchi.   Abdominal:      General: Bowel sounds are normal.      Palpations: Abdomen is soft.      Tenderness: There is no abdominal tenderness.   Musculoskeletal:         General: Normal range of motion.      Cervical back: Normal range of motion and neck supple.   Skin:     General: Skin is warm and dry.   Neurological:      General: No focal deficit present.      Mental Status: She is alert and oriented to person, place, and time. Mental status is at baseline.      Cranial Nerves: No cranial nerve deficit.      Sensory: No sensory deficit.      Motor: No weakness.      Coordination: Coordination normal.      Gait: Gait normal.   Psychiatric:         Attention and Perception: Attention and perception normal.         Mood and Affect: Mood and affect normal.         Speech: Speech normal.         Behavior: Behavior is cooperative.         Cognition and Memory: Cognition normal.         Judgment: Judgment normal.

## 2024-09-05 NOTE — PATIENT INSTRUCTIONS
Complete labs- these are fasting.     Consider lung cancer screening in next year or so.     Complete mammogram.    Complete colonoscopy.     Please call the office if you are experiencing any worsening of symptoms or no symptom improvement.

## 2024-09-06 NOTE — ASSESSMENT & PLAN NOTE
4.2 cm right ovarian cyst with thin internal septation. No solid mural nodule. Based on the ACR O-RADS system, this is O-RADS category 3 (low-risk with 1% to <10% risk of malignancy.) The management recommendation is management by gynecologist.     Following with GYN- had procedure June 2022. F/u with GYN.

## 2024-09-19 ENCOUNTER — HOSPITAL ENCOUNTER (OUTPATIENT)
Dept: MAMMOGRAPHY | Facility: MEDICAL CENTER | Age: 49
Discharge: HOME | End: 2024-09-19
Payer: COMMERCIAL

## 2024-09-19 DIAGNOSIS — Z12.31 ENCOUNTER FOR SCREENING MAMMOGRAM FOR BREAST CANCER: ICD-10-CM

## 2024-09-19 PROCEDURE — 77067 SCR MAMMO BI INCL CAD: CPT

## 2024-09-19 PROCEDURE — 77063 BREAST TOMOSYNTHESIS BI: CPT

## 2024-09-24 ENCOUNTER — CLINICAL SUPPORT (OUTPATIENT)
Dept: FAMILY MEDICINE CLINIC | Facility: CLINIC | Age: 49
End: 2024-09-24
Payer: COMMERCIAL

## 2024-09-24 DIAGNOSIS — E53.8 B12 DEFICIENCY: Primary | ICD-10-CM

## 2024-09-24 PROCEDURE — 96372 THER/PROPH/DIAG INJ SC/IM: CPT

## 2024-09-24 RX ADMIN — CYANOCOBALAMIN 1000 MCG: 1000 INJECTION, SOLUTION INTRAMUSCULAR; SUBCUTANEOUS at 17:12

## 2024-09-27 ENCOUNTER — OFFICE VISIT (OUTPATIENT)
Dept: OBGYN CLINIC | Facility: MEDICAL CENTER | Age: 49
End: 2024-09-27
Payer: COMMERCIAL

## 2024-09-27 VITALS
SYSTOLIC BLOOD PRESSURE: 128 MMHG | WEIGHT: 228 LBS | HEART RATE: 64 BPM | DIASTOLIC BLOOD PRESSURE: 76 MMHG | BODY MASS INDEX: 34.56 KG/M2 | HEIGHT: 68 IN

## 2024-09-27 DIAGNOSIS — M25.511 ACUTE PAIN OF RIGHT SHOULDER: Primary | ICD-10-CM

## 2024-09-27 DIAGNOSIS — M19.011 OSTEOARTHRITIS OF RIGHT AC (ACROMIOCLAVICULAR) JOINT: ICD-10-CM

## 2024-09-27 PROCEDURE — 99203 OFFICE O/P NEW LOW 30 MIN: CPT | Performed by: EMERGENCY MEDICINE

## 2024-09-27 NOTE — PROGRESS NOTES
Assessment/Plan:    Diagnoses and all orders for this visit:    Acute pain of right shoulder  -     Ambulatory Referral to Orthopedic Surgery    Osteoarthritis of right AC (acromioclavicular) joint    Lump on exam corresponds to acromion osteophyte seen on Xray    Return if symptoms worsen or fail to improve.      Subjective:   Patient ID: Kayode Washburn is a 48 y.o. female.    NP presents for a lump on the top of her shoulder, noticed about 2-3 months ago, mildly tender to palpation but no pain with use, denies injury.  Xrays were obtained and reviewed today      Review of Systems    The following portions of the patient's chart were reviewed and updated as appropriate:   Allergy:    Allergies   Allergen Reactions    Lamisil [Terbinafine]     Prednisone      Rapid heart beat and palpitations        Medications:    Current Outpatient Medications:     acetaminophen (TYLENOL) 500 mg tablet, Take 2 tablets (1,000 mg total) by mouth every 6 (six) hours as needed for mild pain, Disp: 30 tablet, Rfl: 0    Cyanocobalamin (B-12) 1000 MCG/ML KIT, Inject as directed every 30 (thirty) days, Disp: , Rfl:     famotidine-calcium carbonate-magnesium hydroxide (Pepcid Complete) -165 MG CHEW, Chew 1 tablet daily as needed for heartburn, Disp: , Rfl:     gabapentin (NEURONTIN) 100 mg capsule, Take 1 capsule (100 mg total) by mouth 2 (two) times a day In addition to 400 mg hs, Disp: 60 capsule, Rfl: 5    gabapentin (NEURONTIN) 400 mg capsule, Take 1 capsule (400 mg total) by mouth daily at bedtime, Disp: 90 capsule, Rfl: 1    levothyroxine 200 mcg tablet, TAKE 1 TABLET BY MOUTH EVERY DAY, Disp: 90 tablet, Rfl: 1    levothyroxine 50 mcg tablet, TAKE 1 TABLET BY MOUTH EVERY DAY, Disp: 90 tablet, Rfl: 1    pseudoephedrine (SUDAFED) 60 mg tablet, Take 60 mg by mouth every 4 (four) hours as needed for congestion, Disp: , Rfl:     Current Facility-Administered Medications:     cyanocobalamin injection 1,000 mcg, 1,000 mcg,  "Intramuscular, Q30 Days, Dre Cazares DO, 1,000 mcg at 09/24/24 1712    Patient Active Problem List   Diagnosis    Chronic lymphocytic thyroiditis    Obesity    Sciatica    Leukemoid reaction    B12 deficiency    Gastroesophageal reflux disease without esophagitis    Tobacco use    Paresthesia of foot, bilateral    Paresthesia of bilateral legs    Idiopathic small fiber sensory neuropathy    Postoperative hypothyroidism    Abnormal uterine bleeding (AUB)    Right ovarian cyst    S/P D&C (status post dilation and curettage)    S/P endometrial ablation    Pharyngitis       Objective:  /76   Pulse 64   Ht 5' 8\" (1.727 m)   Wt 103 kg (228 lb)   LMP  (LMP Unknown)   BMI 34.67 kg/m²     Right Shoulder Exam     Range of Motion   The patient has normal right shoulder ROM.    Muscle Strength   External rotation: 5/5     Comments:  Neg AC sign  There is a solid lump mildly ttp overlying the ACJ            Physical Exam      Neurologic Exam    Procedures    I have personally reviewed pertinent films in PACS. and I have personally reviewed the written report of the pertinent studies.             Past Medical History:   Diagnosis Date    Abnormal Pap smear of cervix     with ASUS favoring benign, last assessed 1/9/14    Abnormal uterine bleeding (AUB)     ongoing menses x  4 months- D&C/ablation today 6/3/2022    Disease of thyroid gland     total thyroidectomy- goiter    Mild acid reflux     Paresthesia of foot, bilateral     Sinus headache     Tobacco abuse        Past Surgical History:   Procedure Laterality Date    BREAST BIOPSY Left 2011    benign    COLONOSCOPY      NM HYSTEROSCOPY BX ENDOMETRIUM&/POLYPC W/WO D&C N/A 06/03/2022    Procedure: D&C WITH HYSTEROSCOPY;  Surgeon: Kayode Chua MD;  Location: [facility];  Service: Gynecology    NM HYSTEROSCOPY ENDOMETRIAL ABLATION N/A 06/03/2022    Procedure: NOVASURE;  Surgeon: Kayode Chua MD;  Location: [facility];  Service: Gynecology    TONSILLECTOMY AND " ADENOIDECTOMY      TOTAL THYROIDECTOMY N/A 06/21/2014    Dr. Todd    US GUIDED BREAST BIOPSY LEFT COMPLETE Left        Social History     Socioeconomic History    Marital status: /Civil Union     Spouse name: Not on file    Number of children: Not on file    Years of education: Not on file    Highest education level: Not on file   Occupational History    Not on file   Tobacco Use    Smoking status: Every Day     Current packs/day: 1.50     Average packs/day: 1.5 packs/day for 28.0 years (42.0 ttl pk-yrs)     Types: Cigarettes    Smokeless tobacco: Never   Vaping Use    Vaping status: Never Used   Substance and Sexual Activity    Alcohol use: Yes     Alcohol/week: 3.0 standard drinks of alcohol     Types: 3 Shots of liquor per week     Comment: 4 x  monthly    Drug use: No    Sexual activity: Not Currently     Partners: Male     Birth control/protection: None   Other Topics Concern    Not on file   Social History Narrative    Consumes 2 cups of tea per day     Social Determinants of Health     Financial Resource Strain: Not on file   Food Insecurity: Not on file   Transportation Needs: Not on file   Physical Activity: Not on file   Stress: Not on file   Social Connections: Not on file   Intimate Partner Violence: Not on file   Housing Stability: Not on file       Family History   Problem Relation Age of Onset    Diabetes Son     Diabetes type II Mother     Hypertension Father         benign essential    No Known Problems Maternal Grandmother     Heart attack Maternal Grandfather     Heart disease Maternal Grandfather     Dementia Paternal Grandmother     No Known Problems Paternal Grandfather     No Known Problems Maternal Aunt     Breast cancer Maternal Aunt 68    No Known Problems Maternal Aunt     Diabetes Brother     Colon cancer Neg Hx     Ovarian cancer Neg Hx

## 2024-10-24 ENCOUNTER — CLINICAL SUPPORT (OUTPATIENT)
Dept: FAMILY MEDICINE CLINIC | Facility: CLINIC | Age: 49
End: 2024-10-24
Payer: COMMERCIAL

## 2024-10-24 DIAGNOSIS — E53.8 B12 DEFICIENCY: Primary | ICD-10-CM

## 2024-10-24 PROCEDURE — 99212 OFFICE O/P EST SF 10 MIN: CPT

## 2024-10-24 RX ADMIN — CYANOCOBALAMIN 1000 MCG: 1000 INJECTION, SOLUTION INTRAMUSCULAR; SUBCUTANEOUS at 16:49

## 2024-11-14 ENCOUNTER — TELEPHONE (OUTPATIENT)
Dept: FAMILY MEDICINE CLINIC | Facility: CLINIC | Age: 49
End: 2024-11-14

## 2024-11-14 NOTE — TELEPHONE ENCOUNTER
Patient called looking for an appt. She has ear pain for the last few days. Any way you can squeeze her in tomorrow at all? Please advise.

## 2024-11-25 ENCOUNTER — CLINICAL SUPPORT (OUTPATIENT)
Dept: FAMILY MEDICINE CLINIC | Facility: CLINIC | Age: 49
End: 2024-11-25
Payer: COMMERCIAL

## 2024-11-25 DIAGNOSIS — E53.8 B12 DEFICIENCY: Primary | ICD-10-CM

## 2024-11-25 PROCEDURE — 96372 THER/PROPH/DIAG INJ SC/IM: CPT

## 2024-11-25 RX ADMIN — CYANOCOBALAMIN 1000 MCG: 1000 INJECTION, SOLUTION INTRAMUSCULAR; SUBCUTANEOUS at 16:41

## 2024-12-23 ENCOUNTER — CLINICAL SUPPORT (OUTPATIENT)
Dept: FAMILY MEDICINE CLINIC | Facility: CLINIC | Age: 49
End: 2024-12-23
Payer: COMMERCIAL

## 2024-12-23 DIAGNOSIS — E53.8 B12 DEFICIENCY: Primary | ICD-10-CM

## 2024-12-23 PROCEDURE — 96372 THER/PROPH/DIAG INJ SC/IM: CPT

## 2024-12-23 RX ADMIN — CYANOCOBALAMIN 1000 MCG: 1000 INJECTION, SOLUTION INTRAMUSCULAR; SUBCUTANEOUS at 14:31

## 2025-01-28 ENCOUNTER — TELEPHONE (OUTPATIENT)
Age: 50
End: 2025-01-28

## 2025-01-28 NOTE — TELEPHONE ENCOUNTER
Patient called requesting B12 vaccine(s) for the following reason(s): monthly injection.     No order in system. Please advise and/or schedule accordingly.

## 2025-01-30 ENCOUNTER — CLINICAL SUPPORT (OUTPATIENT)
Dept: FAMILY MEDICINE CLINIC | Facility: CLINIC | Age: 50
End: 2025-01-30
Payer: COMMERCIAL

## 2025-01-30 DIAGNOSIS — E53.8 B12 DEFICIENCY: Primary | ICD-10-CM

## 2025-01-30 DIAGNOSIS — E53.8 VITAMIN B 12 DEFICIENCY: Primary | ICD-10-CM

## 2025-01-30 PROCEDURE — 99211 OFF/OP EST MAY X REQ PHY/QHP: CPT

## 2025-01-30 PROCEDURE — 96372 THER/PROPH/DIAG INJ SC/IM: CPT

## 2025-01-30 RX ORDER — CYANOCOBALAMIN 1000 UG/ML
1000 INJECTION, SOLUTION INTRAMUSCULAR; SUBCUTANEOUS
Status: CANCELLED | OUTPATIENT
Start: 2025-01-30

## 2025-01-30 RX ADMIN — CYANOCOBALAMIN 1000 MCG: 1000 INJECTION, SOLUTION INTRAMUSCULAR; SUBCUTANEOUS at 09:19

## 2025-02-04 NOTE — PROGRESS NOTES
Name: Kayode Washburn      : 1975      MRN: [id number]  Encounter Provider: [facility]  Encounter Date: 2025   Encounter department: [facility]  :  Assessment & Plan           History of Present Illness   HPI  Review of Systems    Objective   LMP  (LMP Unknown)      Physical Exam

## 2025-02-15 DIAGNOSIS — G62.9 POLYNEUROPATHY: ICD-10-CM

## 2025-02-17 ENCOUNTER — TELEPHONE (OUTPATIENT)
Dept: FAMILY MEDICINE CLINIC | Facility: CLINIC | Age: 50
End: 2025-02-17

## 2025-02-17 RX ORDER — GABAPENTIN 400 MG/1
400 CAPSULE ORAL
Qty: 30 CAPSULE | Refills: 0 | Status: SHIPPED | OUTPATIENT
Start: 2025-02-17 | End: 2025-08-16

## 2025-02-17 NOTE — TELEPHONE ENCOUNTER
Called pt and advised to go for the fasting lab work ordered for her and also to let know a courtesy fill of Neurontin 400 mg was sent to [facility].

## 2025-02-27 ENCOUNTER — CLINICAL SUPPORT (OUTPATIENT)
Dept: FAMILY MEDICINE CLINIC | Facility: CLINIC | Age: 50
End: 2025-02-27
Payer: COMMERCIAL

## 2025-02-27 DIAGNOSIS — E53.8 B12 DEFICIENCY: Primary | ICD-10-CM

## 2025-02-27 PROCEDURE — 96372 THER/PROPH/DIAG INJ SC/IM: CPT

## 2025-02-27 RX ADMIN — CYANOCOBALAMIN 1000 MCG: 1000 INJECTION, SOLUTION INTRAMUSCULAR; SUBCUTANEOUS at 16:41

## 2025-03-07 ENCOUNTER — APPOINTMENT (OUTPATIENT)
Dept: LAB | Facility: CLINIC | Age: 50
End: 2025-03-07
Payer: COMMERCIAL

## 2025-03-07 DIAGNOSIS — Z00.00 HEALTH MAINTENANCE EXAMINATION: ICD-10-CM

## 2025-03-07 DIAGNOSIS — E55.9 VITAMIN D DEFICIENCY: ICD-10-CM

## 2025-03-07 DIAGNOSIS — E53.8 B12 DEFICIENCY: ICD-10-CM

## 2025-03-07 DIAGNOSIS — E89.0 POSTOPERATIVE HYPOTHYROIDISM: ICD-10-CM

## 2025-03-07 DIAGNOSIS — R73.03 PREDIABETES: ICD-10-CM

## 2025-03-07 LAB
25(OH)D3 SERPL-MCNC: 8.2 NG/ML (ref 30–100)
ALBUMIN SERPL BCG-MCNC: 4.2 G/DL (ref 3.5–5)
ALP SERPL-CCNC: 118 U/L (ref 34–104)
ALT SERPL W P-5'-P-CCNC: 13 U/L (ref 7–52)
ANION GAP SERPL CALCULATED.3IONS-SCNC: 7 MMOL/L (ref 4–13)
AST SERPL W P-5'-P-CCNC: 14 U/L (ref 13–39)
BILIRUB SERPL-MCNC: 0.35 MG/DL (ref 0.2–1)
BUN SERPL-MCNC: 8 MG/DL (ref 5–25)
CALCIUM SERPL-MCNC: 9.1 MG/DL (ref 8.4–10.2)
CHLORIDE SERPL-SCNC: 103 MMOL/L (ref 96–108)
CHOLEST SERPL-MCNC: 148 MG/DL (ref ?–200)
CO2 SERPL-SCNC: 27 MMOL/L (ref 21–32)
CREAT SERPL-MCNC: 0.74 MG/DL (ref 0.6–1.3)
EST. AVERAGE GLUCOSE BLD GHB EST-MCNC: 134 MG/DL
GFR SERPL CREATININE-BSD FRML MDRD: 94 ML/MIN/1.73SQ M
GLUCOSE P FAST SERPL-MCNC: 95 MG/DL (ref 65–99)
HBA1C MFR BLD: 6.3 %
HDLC SERPL-MCNC: 28 MG/DL
LDLC SERPL CALC-MCNC: 76 MG/DL (ref 0–100)
NONHDLC SERPL-MCNC: 120 MG/DL
POTASSIUM SERPL-SCNC: 4 MMOL/L (ref 3.5–5.3)
PROT SERPL-MCNC: 7.1 G/DL (ref 6.4–8.4)
SODIUM SERPL-SCNC: 137 MMOL/L (ref 135–147)
T4 FREE SERPL-MCNC: 1.24 NG/DL (ref 0.61–1.12)
TRIGL SERPL-MCNC: 218 MG/DL (ref ?–150)
TSH SERPL DL<=0.05 MIU/L-ACNC: 0.79 UIU/ML (ref 0.45–4.5)
VIT B12 SERPL-MCNC: 483 PG/ML (ref 180–914)

## 2025-03-07 PROCEDURE — 80053 COMPREHEN METABOLIC PANEL: CPT

## 2025-03-07 PROCEDURE — 82306 VITAMIN D 25 HYDROXY: CPT

## 2025-03-07 PROCEDURE — 84443 ASSAY THYROID STIM HORMONE: CPT

## 2025-03-07 PROCEDURE — 83036 HEMOGLOBIN GLYCOSYLATED A1C: CPT

## 2025-03-07 PROCEDURE — 82607 VITAMIN B-12: CPT

## 2025-03-07 PROCEDURE — 80061 LIPID PANEL: CPT

## 2025-03-07 PROCEDURE — 84439 ASSAY OF FREE THYROXINE: CPT

## 2025-03-07 PROCEDURE — 36415 COLL VENOUS BLD VENIPUNCTURE: CPT

## 2025-03-11 ENCOUNTER — RESULTS FOLLOW-UP (OUTPATIENT)
Dept: FAMILY MEDICINE CLINIC | Facility: CLINIC | Age: 50
End: 2025-03-11

## 2025-03-13 ENCOUNTER — OFFICE VISIT (OUTPATIENT)
Dept: FAMILY MEDICINE CLINIC | Facility: CLINIC | Age: 50
End: 2025-03-13
Payer: COMMERCIAL

## 2025-03-13 VITALS
BODY MASS INDEX: 34.71 KG/M2 | RESPIRATION RATE: 16 BRPM | WEIGHT: 229 LBS | TEMPERATURE: 97.3 F | OXYGEN SATURATION: 97 % | DIASTOLIC BLOOD PRESSURE: 70 MMHG | HEIGHT: 68 IN | SYSTOLIC BLOOD PRESSURE: 110 MMHG | HEART RATE: 61 BPM

## 2025-03-13 DIAGNOSIS — R73.03 PREDIABETES: ICD-10-CM

## 2025-03-13 DIAGNOSIS — H61.22 EXCESSIVE CERUMEN IN LEFT EAR CANAL: ICD-10-CM

## 2025-03-13 DIAGNOSIS — Z72.0 TOBACCO USE: ICD-10-CM

## 2025-03-13 DIAGNOSIS — E66.811 CLASS 1 OBESITY DUE TO EXCESS CALORIES WITHOUT SERIOUS COMORBIDITY WITH BODY MASS INDEX (BMI) OF 34.0 TO 34.9 IN ADULT: ICD-10-CM

## 2025-03-13 DIAGNOSIS — E55.9 VITAMIN D DEFICIENCY: ICD-10-CM

## 2025-03-13 DIAGNOSIS — E53.8 B12 DEFICIENCY: ICD-10-CM

## 2025-03-13 DIAGNOSIS — E66.09 CLASS 1 OBESITY DUE TO EXCESS CALORIES WITHOUT SERIOUS COMORBIDITY WITH BODY MASS INDEX (BMI) OF 34.0 TO 34.9 IN ADULT: ICD-10-CM

## 2025-03-13 DIAGNOSIS — G62.9 POLYNEUROPATHY: ICD-10-CM

## 2025-03-13 DIAGNOSIS — E89.0 POSTOPERATIVE HYPOTHYROIDISM: Primary | ICD-10-CM

## 2025-03-13 PROCEDURE — 99214 OFFICE O/P EST MOD 30 MIN: CPT | Performed by: NURSE PRACTITIONER

## 2025-03-13 RX ORDER — LEVOTHYROXINE SODIUM 200 UG/1
200 TABLET ORAL DAILY
Qty: 90 TABLET | Refills: 1 | Status: SHIPPED | OUTPATIENT
Start: 2025-03-13

## 2025-03-13 RX ORDER — LEVOTHYROXINE SODIUM 50 UG/1
50 TABLET ORAL DAILY
Qty: 90 TABLET | Refills: 1 | Status: SHIPPED | OUTPATIENT
Start: 2025-03-13

## 2025-03-13 RX ORDER — ERGOCALCIFEROL 1.25 MG/1
50000 CAPSULE, LIQUID FILLED ORAL WEEKLY
Qty: 12 CAPSULE | Refills: 0 | Status: SHIPPED | OUTPATIENT
Start: 2025-03-13

## 2025-03-13 RX ORDER — GABAPENTIN 400 MG/1
400 CAPSULE ORAL
Qty: 30 CAPSULE | Refills: 5 | Status: SHIPPED | OUTPATIENT
Start: 2025-03-13 | End: 2025-09-09

## 2025-03-13 NOTE — ASSESSMENT & PLAN NOTE
Slightly high T4 but stable/feels well.  Orders:  •  levothyroxine 50 mcg tablet; Take 1 tablet (50 mcg total) by mouth daily  •  levothyroxine 200 mcg tablet; Take 1 tablet (200 mcg total) by mouth daily  •  TSH, 3rd generation; Future  •  T4, free; Future

## 2025-03-13 NOTE — ASSESSMENT & PLAN NOTE
I discussed with her that she is a candidate for lung cancer CT screening.     The following Shared Decision-Making points were covered:  Benefits of screening were discussed, including the rates of reduction in death from lung cancer and other causes.  Harms of screening were reviewed, including false positive tests, radiation exposure levels, risks of invasive procedures, risks of complications of screening, and risk of overdiagnosis.  I counseled on the importance of adherence to annual lung cancer LDCT screening, impact of co-morbidities, and ability or willingness to undergo diagnosis and treatment.  I counseled on the importance of maintaining abstinence as a former smoker or was counseled on the importance of smoking cessation if a current smoker    Review of Eligibility Criteria: She meets all of the criteria for Lung Cancer Screening.   She is 49 y.o.   She has 20 pack year tobacco history and is a current smoker or has quit within the past 15 years  She presents no signs or symptoms of lung cancer    After discussion, the patient decided to elect lung cancer screening.    Orders:  •  CT lung screening program; Future

## 2025-03-13 NOTE — PATIENT INSTRUCTIONS
Start once weekly vitamin d 50,000 units  Weekly x 12 weeks then recheck labs    Recheck B12 in 3 months.     Follow up and routine labs in 6 months.     Consider colonoscopy.     Complete lung cancer screening.     Start using debrox drops to ear or ears that have impacted ear wax. This is 5-10 drops twice a day. Please do this starting 2-3 days prior to scheduled ear flush.     Please call the office if you are experiencing any worsening of symptoms or no symptom improvement.

## 2025-03-13 NOTE — PROGRESS NOTES
Name: Kayode Washburn      : 1975      MRN: [id number]  Encounter Provider: DEE Hardy  Encounter Date: 3/13/2025   Encounter department: [facility]  :  Assessment & Plan  B12 deficiency  Recheck 3 months  Continue with supplementation  Orders:  •  Vitamin B12; Future    Vitamin D deficiency  Start once weekly vitamin d 50,000 units  Weekly x 12 weeks then recheck labs  Orders:  •  ergocalciferol (VITAMIN D2) 50,000 units; Take 1 capsule (50,000 Units total) by mouth once a week  •  Vitamin D 25 hydroxy; Future    Postoperative hypothyroidism  Slightly high T4 but stable/feels well.  Orders:  •  levothyroxine 50 mcg tablet; Take 1 tablet (50 mcg total) by mouth daily  •  levothyroxine 200 mcg tablet; Take 1 tablet (200 mcg total) by mouth daily  •  TSH, 3rd generation; Future  •  T4, free; Future    Polyneuropathy  Stable on gabapentin   Orders:  •  gabapentin (NEURONTIN) 400 mg capsule; Take 1 capsule (400 mg total) by mouth daily at bedtime    Prediabetes  Lab Results   Component Value Date    HGBA1C 6.3 (H) 2025     Slowly improving. Recheck 6 months   Orders:  •  Hemoglobin A1C; Future  •  Comprehensive metabolic panel; Future    Tobacco use  I discussed with her that she is a candidate for lung cancer CT screening.     The following Shared Decision-Making points were covered:  Benefits of screening were discussed, including the rates of reduction in death from lung cancer and other causes.  Harms of screening were reviewed, including false positive tests, radiation exposure levels, risks of invasive procedures, risks of complications of screening, and risk of overdiagnosis.  I counseled on the importance of adherence to annual lung cancer LDCT screening, impact of co-morbidities, and ability or willingness to undergo diagnosis and treatment.  I counseled on the importance of maintaining abstinence as a former smoker or was counseled on the importance of  "smoking cessation if a current smoker    Review of Eligibility Criteria: She meets all of the criteria for Lung Cancer Screening.   She is 49 y.o.   She has 20 pack year tobacco history and is a current smoker or has quit within the past 15 years  She presents no signs or symptoms of lung cancer    After discussion, the patient decided to elect lung cancer screening.    Orders:  •  CT lung screening program; Future    Excessive cerumen in left ear canal  Not impacted. Recommend debrox.   Start using debrox drops to ear or ears that have impacted ear wax. This is 5-10 drops twice a day. Please do this starting 2-3 days prior to scheduled ear flush.   If no improvement call to schedule ear flush       Class 1 obesity due to excess calories without serious comorbidity with body mass index (BMI) of 34.0 to 34.9 in adult  Wt Readings from Last 3 Encounters:   03/13/25 104 kg (229 lb)   09/27/24 103 kg (228 lb)   09/05/24 103 kg (228 lb)     Encouraged healthy diet/exercise               History of Present Illness   Here for follow up. Labs done to be reviewed. Reports left ear pain/irritation.       Review of Systems   Constitutional:  Negative for chills and fever.   HENT:  Positive for ear pain (discomfort left ear for a few days).    Eyes:  Negative for discharge.   Respiratory:  Negative for shortness of breath.    Cardiovascular:  Negative for chest pain.   Gastrointestinal:  Negative for constipation and diarrhea.   Genitourinary:  Negative for difficulty urinating.   Musculoskeletal:  Negative for joint swelling.   Skin:  Negative for rash.   Neurological:  Negative for headaches.   Hematological:  Negative for adenopathy.   Psychiatric/Behavioral:  The patient is not nervous/anxious.        Objective   /70   Pulse 61   Temp (!) 97.3 °F (36.3 °C) (Temporal)   Resp 16   Ht 5' 8\" (1.727 m)   Wt 104 kg (229 lb)   LMP  (LMP Unknown)   SpO2 97%   BMI 34.82 kg/m²      Physical Exam  Vitals and nursing note " reviewed.   Constitutional:       General: She is not in acute distress.     Appearance: Normal appearance. She is well-developed. She is obese. She is not diaphoretic.   HENT:      Head: Normocephalic and atraumatic.      Right Ear: External ear normal.      Left Ear: External ear normal.      Ears:      Comments: Left canal- cerumen present   Eyes:      General: Lids are normal.         Right eye: No discharge.         Left eye: No discharge.      Conjunctiva/sclera: Conjunctivae normal.   Pulmonary:      Effort: Pulmonary effort is normal. No respiratory distress.   Musculoskeletal:         General: No deformity.   Skin:     General: Skin is warm and dry.   Neurological:      General: No focal deficit present.      Mental Status: She is alert and oriented to person, place, and time.   Psychiatric:         Speech: Speech normal.         Behavior: Behavior normal.         Thought Content: Thought content normal.         Judgment: Judgment normal.

## 2025-03-13 NOTE — ASSESSMENT & PLAN NOTE
Wt Readings from Last 3 Encounters:   03/13/25 104 kg (229 lb)   09/27/24 103 kg (228 lb)   09/05/24 103 kg (228 lb)     Encouraged healthy diet/exercise

## 2025-03-27 ENCOUNTER — CLINICAL SUPPORT (OUTPATIENT)
Dept: FAMILY MEDICINE CLINIC | Facility: CLINIC | Age: 50
End: 2025-03-27
Payer: COMMERCIAL

## 2025-03-27 DIAGNOSIS — E53.8 B12 DEFICIENCY: Primary | ICD-10-CM

## 2025-03-27 PROCEDURE — 96372 THER/PROPH/DIAG INJ SC/IM: CPT | Performed by: NURSE PRACTITIONER

## 2025-03-27 RX ADMIN — CYANOCOBALAMIN 1000 MCG: 1000 INJECTION, SOLUTION INTRAMUSCULAR; SUBCUTANEOUS at 16:43

## 2025-04-28 ENCOUNTER — CLINICAL SUPPORT (OUTPATIENT)
Dept: FAMILY MEDICINE CLINIC | Facility: CLINIC | Age: 50
End: 2025-04-28
Payer: COMMERCIAL

## 2025-04-28 DIAGNOSIS — E53.8 B12 DEFICIENCY: Primary | ICD-10-CM

## 2025-04-28 PROCEDURE — 96372 THER/PROPH/DIAG INJ SC/IM: CPT

## 2025-04-28 RX ADMIN — CYANOCOBALAMIN 1000 MCG: 1000 INJECTION, SOLUTION INTRAMUSCULAR; SUBCUTANEOUS at 16:48

## 2025-05-28 ENCOUNTER — CLINICAL SUPPORT (OUTPATIENT)
Dept: FAMILY MEDICINE CLINIC | Facility: CLINIC | Age: 50
End: 2025-05-28
Payer: COMMERCIAL

## 2025-05-28 DIAGNOSIS — E53.8 B12 DEFICIENCY: Primary | ICD-10-CM

## 2025-05-28 PROCEDURE — 96372 THER/PROPH/DIAG INJ SC/IM: CPT

## 2025-05-28 RX ADMIN — CYANOCOBALAMIN 1000 MCG: 1000 INJECTION, SOLUTION INTRAMUSCULAR; SUBCUTANEOUS at 16:50

## 2025-06-15 DIAGNOSIS — G62.9 POLYNEUROPATHY: ICD-10-CM

## 2025-06-15 DIAGNOSIS — G25.81 RLS (RESTLESS LEGS SYNDROME): ICD-10-CM

## 2025-06-15 DIAGNOSIS — G60.8 IDIOPATHIC SMALL FIBER SENSORY NEUROPATHY: ICD-10-CM

## 2025-06-15 RX ORDER — GABAPENTIN 100 MG/1
CAPSULE ORAL
Qty: 60 CAPSULE | Refills: 5 | Status: SHIPPED | OUTPATIENT
Start: 2025-06-15

## 2025-06-30 ENCOUNTER — CLINICAL SUPPORT (OUTPATIENT)
Dept: FAMILY MEDICINE CLINIC | Facility: CLINIC | Age: 50
End: 2025-06-30
Payer: COMMERCIAL

## 2025-06-30 DIAGNOSIS — E53.8 B12 DEFICIENCY: Primary | ICD-10-CM

## 2025-06-30 PROCEDURE — 99211 OFF/OP EST MAY X REQ PHY/QHP: CPT

## 2025-06-30 PROCEDURE — 96372 THER/PROPH/DIAG INJ SC/IM: CPT

## 2025-06-30 RX ADMIN — CYANOCOBALAMIN 1000 MCG: 1000 INJECTION, SOLUTION INTRAMUSCULAR; SUBCUTANEOUS at 16:55

## 2025-07-30 ENCOUNTER — CLINICAL SUPPORT (OUTPATIENT)
Dept: FAMILY MEDICINE CLINIC | Facility: CLINIC | Age: 50
End: 2025-07-30
Payer: COMMERCIAL

## 2025-07-30 DIAGNOSIS — E53.8 B12 DEFICIENCY: Primary | ICD-10-CM

## 2025-07-30 PROCEDURE — 96372 THER/PROPH/DIAG INJ SC/IM: CPT

## 2025-07-30 RX ADMIN — CYANOCOBALAMIN 1000 MCG: 1000 INJECTION, SOLUTION INTRAMUSCULAR; SUBCUTANEOUS at 16:52

## (undated) DEVICE — PREMIUM DRY TRAY LF: Brand: MEDLINE INDUSTRIES, INC.

## (undated) DEVICE — SCD SEQUENTIAL COMPRESSION COMFORT SLEEVE MEDIUM KNEE LENGTH: Brand: KENDALL SCD

## (undated) DEVICE — NOVASURE ADVANCED DEVICE

## (undated) DEVICE — UNDER BUTTOCKS DRAPE W/FLUID CONTROL POUCH: Brand: CONVERTORS

## (undated) DEVICE — DRAPE EQUIPMENT RF WAND

## (undated) DEVICE — PVC URETHRAL CATHETER: Brand: DOVER

## (undated) DEVICE — IV EXTENSION TUBING 33 IN

## (undated) DEVICE — CYSTO TUBING SINGLE IRRIGATION

## (undated) DEVICE — 2000CC GUARDIAN II: Brand: GUARDIAN

## (undated) DEVICE — STRL ALLENTOWN HYSTEROSCOPY PK: Brand: CARDINAL HEALTH

## (undated) DEVICE — GLOVE PI ULTRA TOUCH SZ.7.0

## (undated) DEVICE — STERILE 8 INCH PROCTO SWAB: Brand: CARDINAL HEALTH